# Patient Record
Sex: FEMALE | Race: WHITE | Employment: UNEMPLOYED | URBAN - METROPOLITAN AREA
[De-identification: names, ages, dates, MRNs, and addresses within clinical notes are randomized per-mention and may not be internally consistent; named-entity substitution may affect disease eponyms.]

---

## 2017-03-06 ENCOUNTER — HOSPITAL ENCOUNTER (EMERGENCY)
Age: 37
Discharge: HOME | End: 2017-03-06
Attending: EMERGENCY MEDICINE

## 2017-03-06 ENCOUNTER — APPOINTMENT (OUTPATIENT)
Dept: GENERAL RADIOLOGY | Age: 37
End: 2017-03-06
Attending: PHYSICIAN ASSISTANT

## 2017-03-06 VITALS
RESPIRATION RATE: 20 BRPM | HEART RATE: 116 BPM | HEIGHT: 60 IN | OXYGEN SATURATION: 98 % | WEIGHT: 170 LBS | BODY MASS INDEX: 33.38 KG/M2 | SYSTOLIC BLOOD PRESSURE: 149 MMHG | DIASTOLIC BLOOD PRESSURE: 90 MMHG | TEMPERATURE: 98 F

## 2017-03-06 DIAGNOSIS — J20.8 ACUTE BRONCHITIS DUE TO OTHER SPECIFIED ORGANISMS: Primary | ICD-10-CM

## 2017-03-06 PROCEDURE — 94640 AIRWAY INHALATION TREATMENT: CPT

## 2017-03-06 PROCEDURE — 99284 EMERGENCY DEPT VISIT MOD MDM: CPT

## 2017-03-06 PROCEDURE — 99283 EMERGENCY DEPT VISIT LOW MDM: CPT

## 2017-03-06 PROCEDURE — 71020 XR CHEST PA + LAT CHEST (CPT=71020): CPT

## 2017-03-06 RX ORDER — IPRATROPIUM BROMIDE AND ALBUTEROL SULFATE 2.5; .5 MG/3ML; MG/3ML
3 SOLUTION RESPIRATORY (INHALATION) ONCE
Status: COMPLETED | OUTPATIENT
Start: 2017-03-06 | End: 2017-03-06

## 2017-03-06 RX ORDER — AZITHROMYCIN 250 MG/1
TABLET, FILM COATED ORAL
Qty: 1 PACKAGE | Refills: 0 | Status: SHIPPED | OUTPATIENT
Start: 2017-03-06 | End: 2017-03-11

## 2017-03-06 RX ORDER — PREDNISONE 20 MG/1
40 TABLET ORAL DAILY
Qty: 10 TABLET | Refills: 0 | Status: SHIPPED | OUTPATIENT
Start: 2017-03-06 | End: 2017-03-11

## 2017-03-06 RX ORDER — ALBUTEROL SULFATE 90 UG/1
2 AEROSOL, METERED RESPIRATORY (INHALATION) EVERY 4 HOURS PRN
Qty: 1 INHALER | Refills: 0 | Status: SHIPPED | OUTPATIENT
Start: 2017-03-06 | End: 2017-04-05

## 2017-03-06 RX ORDER — BENZONATATE 200 MG/1
200 CAPSULE ORAL 3 TIMES DAILY PRN
Qty: 30 CAPSULE | Refills: 0 | Status: SHIPPED | OUTPATIENT
Start: 2017-03-06 | End: 2019-12-18 | Stop reason: CLARIF

## 2017-03-06 NOTE — ED PROVIDER NOTES
I reviewed that chart and discussed the case. I have examined the patient and noted lungs are clear to auscultation bilaterally no wheezes retractions rhonchi cardiovascular exam shows regular rhythm. .      I agree with the following clinical impression(s

## 2017-03-06 NOTE — ED PROVIDER NOTES
Patient Seen in: THE [facility] Emergency Department In [place]    History   Patient presents with:  Dyspnea JAIMIE SOB (respiratory)    Stated Complaint: sob, cough, chest tightness through back    HPI    43-year-old female who comes in complaining of 3 weeks o intact, conjunctiva and cornea clear, normal fundoscopic exam   Ears:  TM pearly gray color, mild b/l effusion, external ear canals normal, both ears, no mastoid tenderness bilaterally   Nose:  Nares symmetrical, minimal watery discharge; no sinus tenderne patient. Patient feels much more comfortable after completing her nebulizer treatment. Reviewed at home treatment plan including antibiotic, steroid and inhaler.   If patient starts to get more short of breath or to have chest pain or palpitation she needs

## 2018-05-04 ENCOUNTER — TELEPHONE (OUTPATIENT)
Dept: FAMILY MEDICINE CLINIC | Facility: CLINIC | Age: 38
End: 2018-05-04

## 2018-05-15 ENCOUNTER — TELEPHONE (OUTPATIENT)
Dept: FAMILY MEDICINE CLINIC | Facility: CLINIC | Age: 38
End: 2018-05-15

## 2019-04-23 ENCOUNTER — LAB ENCOUNTER (OUTPATIENT)
Dept: LAB | Age: 39
End: 2019-04-23
Attending: OBSTETRICS & GYNECOLOGY
Payer: COMMERCIAL

## 2019-04-23 ENCOUNTER — OFFICE VISIT (OUTPATIENT)
Dept: OBGYN CLINIC | Facility: CLINIC | Age: 39
End: 2019-04-23
Payer: COMMERCIAL

## 2019-04-23 ENCOUNTER — TELEPHONE (OUTPATIENT)
Dept: OBGYN CLINIC | Facility: CLINIC | Age: 39
End: 2019-04-23

## 2019-04-23 VITALS
HEART RATE: 104 BPM | DIASTOLIC BLOOD PRESSURE: 92 MMHG | WEIGHT: 192.19 LBS | HEIGHT: 60 IN | SYSTOLIC BLOOD PRESSURE: 130 MMHG | BODY MASS INDEX: 37.73 KG/M2

## 2019-04-23 DIAGNOSIS — Z01.419 ENCOUNTER FOR WELL WOMAN EXAM WITH ROUTINE GYNECOLOGICAL EXAM: Primary | ICD-10-CM

## 2019-04-23 DIAGNOSIS — Z12.4 CERVICAL CANCER SCREENING: ICD-10-CM

## 2019-04-23 DIAGNOSIS — Z01.419 ENCOUNTER FOR WELL WOMAN EXAM WITH ROUTINE GYNECOLOGICAL EXAM: ICD-10-CM

## 2019-04-23 PROCEDURE — 82306 VITAMIN D 25 HYDROXY: CPT | Performed by: OBSTETRICS & GYNECOLOGY

## 2019-04-23 PROCEDURE — 99385 PREV VISIT NEW AGE 18-39: CPT | Performed by: OBSTETRICS & GYNECOLOGY

## 2019-04-23 PROCEDURE — 80050 GENERAL HEALTH PANEL: CPT | Performed by: OBSTETRICS & GYNECOLOGY

## 2019-04-23 PROCEDURE — 87624 HPV HI-RISK TYP POOLED RSLT: CPT | Performed by: OBSTETRICS & GYNECOLOGY

## 2019-04-23 PROCEDURE — 80061 LIPID PANEL: CPT | Performed by: OBSTETRICS & GYNECOLOGY

## 2019-04-23 PROCEDURE — 88175 CYTOPATH C/V AUTO FLUID REDO: CPT | Performed by: OBSTETRICS & GYNECOLOGY

## 2019-04-23 NOTE — PROGRESS NOTES
Rafael Sears is a 45year old female  Patient's last menstrual period was 2019 (exact date). Patient presents with:  Wellness Visit: Essure removed possibly  .   Patient c/o occasional cramping , had essure placed , concerned if that Attends meetings of clubs or organizations: Not on file        Relationship status: Not on file      Intimate partner violence:        Fear of current or ex partner: Not on file        Emotionally abused: Not on file        Physically abused: Not on file Denies any breast pain, lumps, or discharge. Neurological:  denies headaches, extremity weakness or numbness. Psychiatric: denies depression or anxiety. Endocrine:   denies excessive thirst or urination.   Heme/Lymph:  denies history of anemia, easy bru

## 2019-04-23 NOTE — TELEPHONE ENCOUNTER
Per Agapito R from [address] with the [employer], pt has an active medical coverage with an effective date of 09- to present. No reference # for this call just Will R and today's date 04-.  Thanks

## 2019-04-24 RX ORDER — ERGOCALCIFEROL (VITAMIN D2) 1250 MCG
50000 CAPSULE ORAL WEEKLY
Qty: 5 CAPSULE | Refills: 0 | Status: SHIPPED | OUTPATIENT
Start: 2019-04-24 | End: 2019-05-23

## 2019-12-14 ENCOUNTER — APPOINTMENT (OUTPATIENT)
Dept: GENERAL RADIOLOGY | Age: 39
DRG: 153 | End: 2019-12-14
Attending: PHYSICIAN ASSISTANT
Payer: COMMERCIAL

## 2019-12-14 ENCOUNTER — APPOINTMENT (OUTPATIENT)
Dept: CT IMAGING | Facility: HOSPITAL | Age: 39
DRG: 153 | End: 2019-12-14
Attending: HOSPITALIST
Payer: COMMERCIAL

## 2019-12-14 ENCOUNTER — HOSPITAL ENCOUNTER (INPATIENT)
Facility: HOSPITAL | Age: 39
LOS: 1 days | Discharge: HOME | DRG: 153 | End: 2019-12-15
Attending: EMERGENCY MEDICINE | Admitting: HOSPITALIST
Payer: COMMERCIAL

## 2019-12-14 DIAGNOSIS — R74.02 ELEVATED SERUM LACTATE DEHYDROGENASE: ICD-10-CM

## 2019-12-14 DIAGNOSIS — J02.0 STREP PHARYNGITIS: Primary | ICD-10-CM

## 2019-12-14 DIAGNOSIS — D72.829 LEUKOCYTOSIS, UNSPECIFIED TYPE: ICD-10-CM

## 2019-12-14 DIAGNOSIS — A40.0 SEPSIS DUE TO GROUP A STREPTOCOCCUS, UNSPECIFIED WHETHER ACUTE ORGAN DYSFUNCTION PRESENT (HCC): ICD-10-CM

## 2019-12-14 PROCEDURE — 99223 1ST HOSP IP/OBS HIGH 75: CPT | Performed by: HOSPITALIST

## 2019-12-14 PROCEDURE — 70491 CT SOFT TISSUE NECK W/DYE: CPT | Performed by: HOSPITALIST

## 2019-12-14 PROCEDURE — 71046 X-RAY EXAM CHEST 2 VIEWS: CPT | Performed by: PHYSICIAN ASSISTANT

## 2019-12-14 RX ORDER — KETOROLAC TROMETHAMINE 30 MG/ML
30 INJECTION, SOLUTION INTRAMUSCULAR; INTRAVENOUS ONCE
Status: COMPLETED | OUTPATIENT
Start: 2019-12-14 | End: 2019-12-14

## 2019-12-14 RX ORDER — DEXAMETHASONE SODIUM PHOSPHATE 4 MG/ML
6 VIAL (ML) INJECTION ONCE
Status: COMPLETED | OUTPATIENT
Start: 2019-12-14 | End: 2019-12-14

## 2019-12-14 RX ORDER — SODIUM CHLORIDE 9 MG/ML
INJECTION, SOLUTION INTRAVENOUS CONTINUOUS
Status: ACTIVE | OUTPATIENT
Start: 2019-12-14 | End: 2019-12-14

## 2019-12-14 RX ORDER — KETOROLAC TROMETHAMINE 30 MG/ML
15 INJECTION, SOLUTION INTRAMUSCULAR; INTRAVENOUS EVERY 6 HOURS PRN
Status: DISCONTINUED | OUTPATIENT
Start: 2019-12-14 | End: 2019-12-15

## 2019-12-14 RX ORDER — ONDANSETRON 2 MG/ML
4 INJECTION INTRAMUSCULAR; INTRAVENOUS EVERY 6 HOURS PRN
Status: DISCONTINUED | OUTPATIENT
Start: 2019-12-14 | End: 2019-12-15

## 2019-12-14 RX ORDER — TRAZODONE HYDROCHLORIDE 50 MG/1
50 TABLET ORAL NIGHTLY PRN
Status: DISCONTINUED | OUTPATIENT
Start: 2019-12-14 | End: 2019-12-15

## 2019-12-14 RX ORDER — METOCLOPRAMIDE HYDROCHLORIDE 5 MG/ML
10 INJECTION INTRAMUSCULAR; INTRAVENOUS EVERY 8 HOURS PRN
Status: DISCONTINUED | OUTPATIENT
Start: 2019-12-14 | End: 2019-12-15

## 2019-12-14 RX ORDER — ACETAMINOPHEN 325 MG/1
650 TABLET ORAL EVERY 6 HOURS PRN
Status: DISCONTINUED | OUTPATIENT
Start: 2019-12-14 | End: 2019-12-15

## 2019-12-14 NOTE — ED PROVIDER NOTES
Patient Seen in: THE [facility] Emergency Department In [place]      History   Patient presents with:  Sore Throat    Stated Complaint: sore throat    HPI    CHIEF COMPLAINT: Sore throat, right ear pain, left-sided chest pain, fever    HISTORY OF PRESENT ILLN indicated as above. History reviewed. No pertinent past medical history.            Past Surgical History:   Procedure Laterality Date   •   13   • HYSTEROSCOPIC TUBAL STERILIZATION (ESSURE) N/A 2013    Performed by SEDRICK Garcia lymphadenopathy noted. Trachea nontender.   No meningeal signs        Extremities are symmetrical, full range of motion  Psychiatric: there is no agitation      ED Course     Labs Reviewed   COMP METABOLIC PANEL (14) - Abnormal; Notable for the following c gram of Tylenol, liter of fluid. Patient's white blood cell count was 16,000 with a neutrophil shift of 15. Patient's rapid strep test is positive. Patient's lactate is 2.7. Patient's glucose is 214, otherwise unremarkable CMP.     Patient will receive

## 2019-12-15 VITALS
WEIGHT: 193.13 LBS | HEART RATE: 97 BPM | TEMPERATURE: 98 F | RESPIRATION RATE: 20 BRPM | HEIGHT: 60 IN | DIASTOLIC BLOOD PRESSURE: 74 MMHG | BODY MASS INDEX: 37.92 KG/M2 | OXYGEN SATURATION: 97 % | SYSTOLIC BLOOD PRESSURE: 137 MMHG

## 2019-12-15 PROCEDURE — 99238 HOSP IP/OBS DSCHRG MGMT 30/<: CPT | Performed by: HOSPITALIST

## 2019-12-15 NOTE — PLAN OF CARE
NURSING ADMISSION NOTE      Patient admitted via Cart  Oriented to room. Safety precautions initiated. Bed in low position. Call light in reach. Pt A/O x4. Pt here with strep pharyngitis. VSS. C/o neck pain, IV Toradol given with relief.  One time

## 2019-12-15 NOTE — PROGRESS NOTES
Pt seen and examined. Feels much better. Strep throat treated with 1x IM PCN. Ok to DC if remains medically stable. CT neck unremarkable. Pt has new onset DM2 with A1c: 7.6. add metformin on DC.     Apolinar Givens MD

## 2019-12-15 NOTE — PLAN OF CARE
NURSING DISCHARGE NOTE    Discharged Home via Wheelchair. Accompanied by Family member  Belongings Taken by patient/family. Reveiwed AVS with patient pt understood discharge instructions. Pt aware to take prescription to pharmacy.    Educated pt on

## 2019-12-15 NOTE — PLAN OF CARE
Pt it AOx4 on room air and denies any SOB, pt states she in not in any pain. Pt has been afebrile during this shift, and vitals have been stable. Pt was educated with diabetic education .  Pt has no question at this time   Problem: Patient/Family Goals  [facility] Notify MD/LIP if interventions unsuccessful or patient reports new pain  - Anticipate increased pain with activity and pre-medicate as appropriate  Outcome: Progressing     Problem: RISK FOR INFECTION - ADULT  Goal: Absence of fever/infection during antici

## 2019-12-15 NOTE — PLAN OF CARE
Pt is aox4 on room air and denies any SOB pt has denied any pain at this time. Pt states the throat feels better. Educated pt on Diabetes gave pt diabetes packet and informed pt she needs to see a primary care doctor. Pt is aware .  Pt has been afebrile dur assistance  - Assess pain using appropriate pain scale  - Administer analgesics based on type and severity of pain and evaluate response  - Implement non-pharmacological measures as appropriate and evaluate response  - Consider cultural and social influenc

## 2019-12-15 NOTE — H&P
[facility] HOSPITALIST  History and Physical     Liaabbie Malcolm Patient Status:  Inpatient    1980 MRN [id number]   [facility] 4NW-A Attending Rishi Pacheco MD   Hosp Day # 0 PCP None Pcp     Chief Complaint: neck pain    History of Ht 5' (1.524 m)   Wt 193 lb 1.6 oz (87.6 kg)   SpO2 93%   BMI 37.71 kg/m²   General:nad; aox2;very enlarged nearly kissing tonsils in pharynx  Neck: tender right neck  Respiratory: Clear to auscultation bilaterally. No wheezes. No rhonchi.   Cardiovascular:

## 2019-12-16 ENCOUNTER — PATIENT OUTREACH (OUTPATIENT)
Dept: CASE MANAGEMENT | Age: 39
End: 2019-12-16

## 2019-12-16 DIAGNOSIS — Z02.9 ENCOUNTERS FOR UNSPECIFIED ADMINISTRATIVE PURPOSE: ICD-10-CM

## 2019-12-16 DIAGNOSIS — E11.69 DIABETES MELLITUS TYPE 2 IN OBESE (HCC): ICD-10-CM

## 2019-12-16 DIAGNOSIS — E66.9 DIABETES MELLITUS TYPE 2 IN OBESE (HCC): ICD-10-CM

## 2019-12-16 PROCEDURE — 1111F DSCHRG MED/CURRENT MED MERGE: CPT

## 2019-12-16 NOTE — PROGRESS NOTES
A Respi message was sent to the patient for outreach to complete TCM. It was requested the patient call [facility] back at, [phone].

## 2019-12-17 RX ORDER — IBUPROFEN 200 MG
400 TABLET ORAL EVERY 6 HOURS PRN
COMMUNITY
End: 2020-11-13

## 2019-12-17 NOTE — ED PROVIDER NOTES
I have personally reviewed the case with the PA as well as completed a HPI and agree with the care and treatment plan put forth    HEENT : NCAT, EOMI, PEERL, throat clear posterior erythematous tonsils noted without abscess, neck supple, no JVD, trachea mi information is transmitted to the ACR ([facility] [employer] of Radiology) NRDR ([address]) which includes the Dose Index Registry.   PATIENT STATED HISTORY:(As transcribed by Technologist)  Patient complains of right side pain and swellin Given 12/14/19 8749)   iohexol (OMNIPAQUE) 350 MG/ML injection 50 mL (50 mL Intravenous Given 12/14/19 2227)     We will have the patient was of an IV antibiotics as well as given IV fluids per septic protocol.   The patient was observed in the department w

## 2019-12-17 NOTE — PROGRESS NOTES
Initial Post Discharge Follow Up   Discharge Date: 12/15/19  Contact Date: 12/17/2019    Consent Verification:  Assessment Completed With: Patient  HIPAA Verified?   Yes    Discharge Dx:    Strep pharyngitis  (primary encounter diagnosis)  Lactic Acidosi explained to you? Yes  • Do you have any questions about your diagnoses? No  • Are you able to perform normal daily activities of living as you have prior to your hospital stay (dressing, bathing, ambulating to the bathroom, etc)?  yes  • (NCM) Was patient PCP TCM/HFU appointment: scheduled at D/C within 7-14 days  yes; Appointment with [facility] was scheduled for 12/18/2019. NCM Reviewed/scheduled/rescheduled PCP TCM/HFU appointment with pt:  Yes      Have you made all of your follow up appointments? 1.75

## 2019-12-18 ENCOUNTER — OFFICE VISIT (OUTPATIENT)
Dept: INTERNAL MEDICINE CLINIC | Facility: CLINIC | Age: 39
End: 2019-12-18
Payer: COMMERCIAL

## 2019-12-18 VITALS
TEMPERATURE: 98 F | HEIGHT: 60 IN | WEIGHT: 189 LBS | OXYGEN SATURATION: 99 % | RESPIRATION RATE: 18 BRPM | BODY MASS INDEX: 37.11 KG/M2 | SYSTOLIC BLOOD PRESSURE: 120 MMHG | DIASTOLIC BLOOD PRESSURE: 88 MMHG | HEART RATE: 91 BPM

## 2019-12-18 DIAGNOSIS — E11.69 DIABETES MELLITUS TYPE 2 IN OBESE (HCC): ICD-10-CM

## 2019-12-18 DIAGNOSIS — J02.0 STREP THROAT: Primary | ICD-10-CM

## 2019-12-18 DIAGNOSIS — E66.9 DIABETES MELLITUS TYPE 2 IN OBESE (HCC): ICD-10-CM

## 2019-12-18 PROCEDURE — 99213 OFFICE O/P EST LOW 20 MIN: CPT | Performed by: CLINICAL NURSE SPECIALIST

## 2019-12-18 PROCEDURE — 1111F DSCHRG MED/CURRENT MED MERGE: CPT | Performed by: CLINICAL NURSE SPECIALIST

## 2019-12-18 RX ORDER — BLOOD SUGAR DIAGNOSTIC
STRIP MISCELLANEOUS
Qty: 100 STRIP | Refills: 0 | Status: SHIPPED | OUTPATIENT
Start: 2019-12-18 | End: 2020-12-17

## 2019-12-18 NOTE — PATIENT INSTRUCTIONS
PATIENT INSTRUCTIONS:   1.  Monitor blood sugar once daily before meals -- bring meter to all MD appts for review      How to Check Your Blood Sugar    Keeping track of how much sugar (glucose) is in your blood is an important part of self-care when you hav test at the lab. · Before a meal (preprandial glucose). The target range is between 80 and 130 mg/dL. · One to 2 hours after a meal (postprandial glucose). The range is less than 180 mg/dL.   Track your readings  Use a notebook, chart, or log book to keep included with meter. Step 4. Read and record your results  · Wait for your meter to show the result. · If you see an error message, recheck using a fresh strip and a fresh drop of blood.  Also recheck if the glucose numbers aren't what you expect—too low of carbohydrates):  · 1/2 cup of canned or frozen fruit  · A small piece of fresh fruit (4 ounces)  · 1 slice of bread  · 1/2 cup of oatmeal  · 1/3 cup of rice  · 4 to 6 crackers  · 1/2 English muffin  · 1/2 cup of black beans  · 1/4 of a large baked potat size, total carbohydrates, fiber, calories, sugar, and saturated and trans fats. Look for healthier alternatives to foods that have added sugar.   · Plan ahead for parties so you can still have a good time without going overboard with unhealthy food choices [address] [facility], [address] [place] [address]. All rights reserved. This information is not intended as a substitute for professional medical care. Always follow your healthcare professional's instructions.

## 2019-12-18 NOTE — PROGRESS NOTES
[address]  TRANSITIONAL CARE CLINIC      HISTORY   CHIEF COMPLAINT: post hospital follow up visit  HPI: Vasyl Morris is a 44year old female here today for follow up after being hospitalized for neck pain, throat pain.   Lili Watkins • Diabetes Mother    • Diabetes Maternal Grandmother    • Heart Disorder Maternal Grandmother    • Diabetes Maternal Grandfather    • Heart Disorder Maternal Grandfather    • Diabetes Paternal Grandmother    • Heart Disorder Paternal Grandmother    • Callie Knapp motion of extremities  ENDOCRINE: denies hypoglycemia   PHYSICAL EXAM:   12/18/19  1252   BP: 120/88   Pulse: 91   Resp: 18   Temp: 98.3 °F (36.8 °C)       GENERAL: well developed, well nourished, in no apparent distress  INTEGUMENTARY: warm, dry  HEENT: a care documents  ? Reviewed need for follow-up on pending tests, need for follow-up on diagnostic tests and need for follow-up on treatments  ? specialists already aware of assumption/resumption of care  ?  Education given to patient on self-management, inde

## 2019-12-18 NOTE — PROGRESS NOTES
TRANSITIONAL CARE CLINIC PHARMACIST MEDICATION RECONCILIATION        Bhupinder Su MRN [id number]    1980 PCP None Pcp       Comments: Medication history completed in [address] [place] [address] by pharmacist with patient.       The following medic

## 2019-12-26 NOTE — DISCHARGE SUMMARY
[facility] HOSPITALIST  DISCHARGE SUMMARY     Rosi Harris Patient Status:  Inpatient    1980 MRN [id number]   [facility] 4NW-A Attending No att. providers found   Hosp Day # 1 PCP None Pcp     Date of Admission: 2019  Date of D PCP    Schedule an appointment as soon as possible for a visit      Appointments for Next 30 Days 12/26/2019 - 1/25/2020      Date Arrival Time Visit Type Length Department Provider     1/9/2020  9:20 AM  NON-[facility] HOSPITAL FOLLOW UP [0720] 40 min.  [id number]

## 2020-01-09 ENCOUNTER — OFFICE VISIT (OUTPATIENT)
Dept: FAMILY MEDICINE CLINIC | Facility: CLINIC | Age: 40
End: 2020-01-09
Payer: COMMERCIAL

## 2020-01-09 VITALS
SYSTOLIC BLOOD PRESSURE: 110 MMHG | HEIGHT: 61 IN | RESPIRATION RATE: 18 BRPM | WEIGHT: 184 LBS | HEART RATE: 111 BPM | BODY MASS INDEX: 34.74 KG/M2 | DIASTOLIC BLOOD PRESSURE: 82 MMHG | OXYGEN SATURATION: 99 %

## 2020-01-09 DIAGNOSIS — E66.9 DIABETES MELLITUS TYPE 2 IN OBESE (HCC): Primary | ICD-10-CM

## 2020-01-09 DIAGNOSIS — E11.69 DIABETES MELLITUS TYPE 2 IN OBESE (HCC): Primary | ICD-10-CM

## 2020-01-09 DIAGNOSIS — E66.9 OBESITY (BMI 30-39.9): ICD-10-CM

## 2020-01-09 DIAGNOSIS — D72.829 LEUKOCYTOSIS, UNSPECIFIED TYPE: ICD-10-CM

## 2020-01-09 PROBLEM — A40.0: Status: RESOLVED | Noted: 2019-12-14 | Resolved: 2020-01-09

## 2020-01-09 PROBLEM — J02.0 STREP THROAT: Status: RESOLVED | Noted: 2019-12-14 | Resolved: 2020-01-09

## 2020-01-09 PROCEDURE — 1111F DSCHRG MED/CURRENT MED MERGE: CPT | Performed by: EMERGENCY MEDICINE

## 2020-01-09 PROCEDURE — 99214 OFFICE O/P EST MOD 30 MIN: CPT | Performed by: EMERGENCY MEDICINE

## 2020-01-09 RX ORDER — ATORVASTATIN CALCIUM 10 MG/1
10 TABLET, FILM COATED ORAL NIGHTLY
Qty: 30 TABLET | Refills: 2 | Status: SHIPPED | OUTPATIENT
Start: 2020-01-09 | End: 2020-04-24

## 2020-01-09 NOTE — PROGRESS NOTES
Chief Complaint:   Patient presents with:  Hospital F/U: NP. Strep, new onset diabetes    HPI:   This is a 44year old female     DIABETES  Recently diagnosed with Diabetes. Returned from [place] not feeling well.  Went to ER and admitted for Strep throat not apply Misc, 1 lancet by Finger stick route daily. , Disp: 100 each, Rfl: 0  ibuprofen 200 MG Oral Tab, Take 400 mg by mouth every 6 (six) hours as needed for Pain., Disp: , Rfl:     No current facility-administered medications on file prior to visit. Calculated Osmolality 288 275 - 295 mOsm/kg    GFR, Non- 84 >=60    GFR, -American 97 >=60    AST 27 15 - 37 U/L    ALT 56 13 - 56 U/L    Alkaline Phosphatase 70 37 - 98 U/L    Bilirubin, Total 1.3 0.1 - 2.0 mg/dL    Total Protein 8. ASSESSMENT AND PLAN:         1. Diabetes mellitus type 2 in obese ([address]  Tolerating meds well with exception of diarrhea but patient will need to continue. Last hemoglobin A1c 7.6 Will start atorvastatin.   All laboratories discussed  Follow-up

## 2020-01-09 NOTE — PATIENT INSTRUCTIONS
Thank you for choosing [facility]  To Do:  FOR Delilah Burrell    · Follow up with Evelina Madrid for DM education  · Have blood tests done after fasting  · After blood draw, Start Atorvastatin for cholesterol  · Follow up in 1 month for mikhail on average over the last 3 months.   Glucose sticks to hemoglobin in the blood. Hemoglobin is a protein in red blood cells that carries oxygen. When blood sugar is high, more glucose builds up and sticks to the hemoglobin.  The A1C test measures how much of used to draw blood from a vein in your arm or hand.   Does this test pose any risks? Having a blood test with a needle carries some risks. These include bleeding, infection, bruising, and feeling lightheaded.  When the needle pricks your arm or hand, you m percentage. But it can also be shown as a number representing the estimated Average Glucose (eAG). Unlike the A1C percentage, eAG is a number similar to the numbers listed on your daily glucose monitor.  Both A1C and eAG measure the amount of glucose stuck blood sugar. · Starches are found in bread, cereals, pasta, and dried beans. They’re also found in corn, peas, potatoes, yam, acorn squash, and butternut squash.  Starches also raise blood sugar.   · Fiber is found in foods such as vegetables, fruits, bean for your heart. Protein  Protein helps the body build and repair muscle and other tissue. Protein has little or no effect on blood sugar. However, many foods that contain protein also contain saturated fat.  By choosing low-fat protein sources, you can get

## 2020-01-10 ENCOUNTER — LABORATORY ENCOUNTER (OUTPATIENT)
Dept: LAB | Age: 40
End: 2020-01-10
Attending: EMERGENCY MEDICINE
Payer: COMMERCIAL

## 2020-01-10 DIAGNOSIS — D72.829 LEUKOCYTOSIS, UNSPECIFIED TYPE: ICD-10-CM

## 2020-01-10 DIAGNOSIS — E11.69 DIABETES MELLITUS TYPE 2 IN OBESE (HCC): ICD-10-CM

## 2020-01-10 DIAGNOSIS — E66.9 DIABETES MELLITUS TYPE 2 IN OBESE (HCC): ICD-10-CM

## 2020-01-10 LAB
ALBUMIN SERPL-MCNC: 3.8 G/DL (ref 3.4–5)
ALBUMIN/GLOB SERPL: 0.9 {RATIO} (ref 1–2)
ALP LIVER SERPL-CCNC: 70 U/L (ref 37–98)
ALT SERPL-CCNC: 56 U/L (ref 13–56)
ANION GAP SERPL CALC-SCNC: 5 MMOL/L (ref 0–18)
AST SERPL-CCNC: 27 U/L (ref 15–37)
BASOPHILS # BLD AUTO: 0.05 X10(3) UL (ref 0–0.2)
BASOPHILS NFR BLD AUTO: 0.6 %
BILIRUB SERPL-MCNC: 1.3 MG/DL (ref 0.1–2)
BUN BLD-MCNC: 15 MG/DL (ref 7–18)
BUN/CREAT SERPL: 17.2 (ref 10–20)
CALCIUM BLD-MCNC: 8.8 MG/DL (ref 8.5–10.1)
CHLORIDE SERPL-SCNC: 107 MMOL/L (ref 98–112)
CHOLEST SMN-MCNC: 198 MG/DL (ref ?–200)
CO2 SERPL-SCNC: 26 MMOL/L (ref 21–32)
CREAT BLD-MCNC: 0.87 MG/DL (ref 0.55–1.02)
CREAT UR-SCNC: 264 MG/DL
DEPRECATED RDW RBC AUTO: 42.5 FL (ref 35.1–46.3)
EOSINOPHIL # BLD AUTO: 0.31 X10(3) UL (ref 0–0.7)
EOSINOPHIL NFR BLD AUTO: 4 %
ERYTHROCYTE [DISTWIDTH] IN BLOOD BY AUTOMATED COUNT: 12.9 % (ref 11–15)
EST. AVERAGE GLUCOSE BLD GHB EST-MCNC: 166 MG/DL (ref 68–126)
GLOBULIN PLAS-MCNC: 4.4 G/DL (ref 2.8–4.4)
GLUCOSE BLD-MCNC: 118 MG/DL (ref 70–99)
HBA1C MFR BLD HPLC: 7.4 % (ref ?–5.7)
HCT VFR BLD AUTO: 42 % (ref 35–48)
HDLC SERPL-MCNC: 42 MG/DL (ref 40–59)
HGB BLD-MCNC: 14 G/DL (ref 12–16)
IMM GRANULOCYTES # BLD AUTO: 0.04 X10(3) UL (ref 0–1)
IMM GRANULOCYTES NFR BLD: 0.5 %
LDLC SERPL CALC-MCNC: 110 MG/DL (ref ?–100)
LYMPHOCYTES # BLD AUTO: 2.42 X10(3) UL (ref 1–4)
LYMPHOCYTES NFR BLD AUTO: 31.1 %
M PROTEIN MFR SERPL ELPH: 8.2 G/DL (ref 6.4–8.2)
MCH RBC QN AUTO: 30.4 PG (ref 26–34)
MCHC RBC AUTO-ENTMCNC: 33.3 G/DL (ref 31–37)
MCV RBC AUTO: 91.1 FL (ref 80–100)
MICROALBUMIN UR-MCNC: 3.11 MG/DL
MICROALBUMIN/CREAT 24H UR-RTO: 11.8 UG/MG (ref ?–30)
MONOCYTES # BLD AUTO: 0.49 X10(3) UL (ref 0.1–1)
MONOCYTES NFR BLD AUTO: 6.3 %
NEUTROPHILS # BLD AUTO: 4.48 X10 (3) UL (ref 1.5–7.7)
NEUTROPHILS # BLD AUTO: 4.48 X10(3) UL (ref 1.5–7.7)
NEUTROPHILS NFR BLD AUTO: 57.5 %
NONHDLC SERPL-MCNC: 156 MG/DL (ref ?–130)
OSMOLALITY SERPL CALC.SUM OF ELEC: 288 MOSM/KG (ref 275–295)
PATIENT FASTING Y/N/NP: YES
PATIENT FASTING Y/N/NP: YES
PLATELET # BLD AUTO: 384 10(3)UL (ref 150–450)
POTASSIUM SERPL-SCNC: 3.8 MMOL/L (ref 3.5–5.1)
RBC # BLD AUTO: 4.61 X10(6)UL (ref 3.8–5.3)
SODIUM SERPL-SCNC: 138 MMOL/L (ref 136–145)
TRIGL SERPL-MCNC: 230 MG/DL (ref 30–149)
VLDLC SERPL CALC-MCNC: 46 MG/DL (ref 0–30)
WBC # BLD AUTO: 7.8 X10(3) UL (ref 4–11)

## 2020-01-10 PROCEDURE — 82570 ASSAY OF URINE CREATININE: CPT | Performed by: EMERGENCY MEDICINE

## 2020-01-10 PROCEDURE — 80061 LIPID PANEL: CPT | Performed by: EMERGENCY MEDICINE

## 2020-01-10 PROCEDURE — 36415 COLL VENOUS BLD VENIPUNCTURE: CPT | Performed by: EMERGENCY MEDICINE

## 2020-01-10 PROCEDURE — 83036 HEMOGLOBIN GLYCOSYLATED A1C: CPT | Performed by: EMERGENCY MEDICINE

## 2020-01-10 PROCEDURE — 80053 COMPREHEN METABOLIC PANEL: CPT | Performed by: EMERGENCY MEDICINE

## 2020-01-10 PROCEDURE — 85025 COMPLETE CBC W/AUTO DIFF WBC: CPT | Performed by: EMERGENCY MEDICINE

## 2020-01-10 PROCEDURE — 82043 UR ALBUMIN QUANTITATIVE: CPT | Performed by: EMERGENCY MEDICINE

## 2020-01-14 ENCOUNTER — TELEPHONE (OUTPATIENT)
Dept: FAMILY MEDICINE CLINIC | Facility: CLINIC | Age: 40
End: 2020-01-14

## 2020-01-14 NOTE — TELEPHONE ENCOUNTER
----- Message from Dionisio Shipman MD sent at 1/13/2020  5:34 PM CST -----  Labs stable  Contoinue present mgt  Follow up as directed  mychart message sent

## 2020-01-16 ENCOUNTER — NURSE ONLY (OUTPATIENT)
Dept: ENDOCRINOLOGY CLINIC | Facility: CLINIC | Age: 40
End: 2020-01-16
Payer: COMMERCIAL

## 2020-01-16 ENCOUNTER — OFFICE VISIT (OUTPATIENT)
Dept: ENDOCRINOLOGY CLINIC | Facility: CLINIC | Age: 40
End: 2020-01-16
Payer: COMMERCIAL

## 2020-01-16 VITALS — BODY MASS INDEX: 35 KG/M2 | HEIGHT: 61 IN

## 2020-01-16 DIAGNOSIS — E11.69 DIABETES MELLITUS TYPE 2 IN OBESE (HCC): ICD-10-CM

## 2020-01-16 DIAGNOSIS — E66.9 DIABETES MELLITUS TYPE 2 IN OBESE (HCC): ICD-10-CM

## 2020-01-16 DIAGNOSIS — E11.69 DIABETES MELLITUS TYPE 2 IN OBESE (HCC): Primary | ICD-10-CM

## 2020-01-16 DIAGNOSIS — E66.9 DIABETES MELLITUS TYPE 2 IN OBESE (HCC): Primary | ICD-10-CM

## 2020-01-16 PROCEDURE — G0108 DIAB MANAGE TRN  PER INDIV: HCPCS | Performed by: DIETITIAN, REGISTERED

## 2020-01-16 NOTE — PROGRESS NOTES
Patient was recently seen by PCP. Patient was intended to be scheduled with CDE for education not with APRN. Clarified order with Dr. Yulia Goodwin. APRN referral placed by [facility] department prior to patients follow up with PCP.   PCP is managing patients diabet

## 2020-01-19 VITALS — BODY MASS INDEX: 35 KG/M2 | WEIGHT: 185 LBS | DIASTOLIC BLOOD PRESSURE: 78 MMHG | SYSTOLIC BLOOD PRESSURE: 112 MMHG

## 2020-01-20 NOTE — PROGRESS NOTES
Joanne Desai  : 1980 attended Step 1 Diabetic Education:    Date: 2020  Referring Provider: Dr. Max Salnias  Start time: 12:30pm End time: 1:30pm    /78 (BP Location: Right arm, Patient Position: Sitting, Cuff Size: adult)   Wt 18 127             128             114             107             117             121             127             103                              Diabetic Medications     Name MG Dosage Comments   Oral: Metformin  500 mg  1 tab twice daily

## 2020-01-31 ENCOUNTER — TELEPHONE (OUTPATIENT)
Dept: FAMILY MEDICINE CLINIC | Facility: CLINIC | Age: 40
End: 2020-01-31

## 2020-02-04 ENCOUNTER — OFFICE VISIT (OUTPATIENT)
Dept: FAMILY MEDICINE CLINIC | Facility: CLINIC | Age: 40
End: 2020-02-04
Payer: COMMERCIAL

## 2020-02-04 VITALS
OXYGEN SATURATION: 98 % | BODY MASS INDEX: 34 KG/M2 | RESPIRATION RATE: 16 BRPM | SYSTOLIC BLOOD PRESSURE: 120 MMHG | HEART RATE: 96 BPM | DIASTOLIC BLOOD PRESSURE: 85 MMHG | WEIGHT: 179 LBS

## 2020-02-04 DIAGNOSIS — E11.69 DIABETES MELLITUS TYPE 2 IN OBESE (HCC): Primary | ICD-10-CM

## 2020-02-04 DIAGNOSIS — E55.9 VITAMIN D DEFICIENCY: ICD-10-CM

## 2020-02-04 DIAGNOSIS — Z28.21 REFUSED INFLUENZA VACCINE: ICD-10-CM

## 2020-02-04 DIAGNOSIS — E11.69 TYPE 2 DIABETES MELLITUS WITH HYPERCHOLESTEROLEMIA (HCC): ICD-10-CM

## 2020-02-04 DIAGNOSIS — E66.9 DIABETES MELLITUS TYPE 2 IN OBESE (HCC): Primary | ICD-10-CM

## 2020-02-04 DIAGNOSIS — E78.00 TYPE 2 DIABETES MELLITUS WITH HYPERCHOLESTEROLEMIA (HCC): ICD-10-CM

## 2020-02-04 DIAGNOSIS — R42 VERTIGO: ICD-10-CM

## 2020-02-04 DIAGNOSIS — Z28.21 REFUSED PNEUMOCOCCAL VACCINE: ICD-10-CM

## 2020-02-04 DIAGNOSIS — E66.9 OBESITY (BMI 30-39.9): ICD-10-CM

## 2020-02-04 PROBLEM — R74.02 ELEVATED SERUM LACTATE DEHYDROGENASE: Status: RESOLVED | Noted: 2019-12-14 | Resolved: 2020-02-04

## 2020-02-04 PROCEDURE — 99214 OFFICE O/P EST MOD 30 MIN: CPT | Performed by: EMERGENCY MEDICINE

## 2020-02-04 NOTE — PROGRESS NOTES
Chief Complaint:   Patient presents with:  Diabetes: med f/u     HPI:   This is a 44year old female         DIABETES  Recently dx with DM. Currently on Metformin 500 BID. Atorvastatin 10 mg daily. C/O 1-2 episodes of dizziness.  Feels like she is moving times daily with meals. , Disp: 60 tablet, Rfl: 2  Glucose Blood (ONETOUCH VERIO) In Vitro Strip, Use as directed., Disp: 100 strip, Rfl: 0  ONETOUCH DELICA LANCETS FINE Does not apply Misc, 1 lancet by Finger stick route daily. , Disp: 100 each, Rfl: 0  ibu Collection Time: 01/10/20  9:28 AM   Result Value Ref Range    Glucose 118 (H) 70 - 99 mg/dL    Sodium 138 136 - 145 mmol/L    Potassium 3.8 3.5 - 5.1 mmol/L    Chloride 107 98 - 112 mmol/L    CO2 26.0 21.0 - 32.0 mmol/L    Anion Gap 5 0 - 18 mmol/L Absolute 2.42 1.00 - 4.00 x10(3) uL    Monocyte Absolute 0.49 0.10 - 1.00 x10(3) uL    Eosinophil Absolute 0.31 0.00 - 0.70 x10(3) uL    Basophil Absolute 0.05 0.00 - 0.20 x10(3) uL    Immature Granulocyte Absolute 0.04 0.00 - 1.00 x10(3) uL    Neutrophil

## 2020-02-04 NOTE — PATIENT INSTRUCTIONS
Thank you for choosing [facility]  To Do:  FOR Lb Cavlo    · [id number] Julianna Galeano to continue with all meds  · Follow up in 3 months  · Continue with weight loss  · continue with daily blood sugar checks  · Return sooner if worse  · Take VIT d OTC 2000

## 2020-02-29 ENCOUNTER — OFFICE VISIT (OUTPATIENT)
Dept: FAMILY MEDICINE CLINIC | Facility: CLINIC | Age: 40
End: 2020-02-29
Payer: COMMERCIAL

## 2020-02-29 VITALS
HEART RATE: 120 BPM | HEIGHT: 61 IN | WEIGHT: 179 LBS | DIASTOLIC BLOOD PRESSURE: 80 MMHG | TEMPERATURE: 99 F | BODY MASS INDEX: 33.79 KG/M2 | OXYGEN SATURATION: 98 % | SYSTOLIC BLOOD PRESSURE: 110 MMHG | RESPIRATION RATE: 16 BRPM

## 2020-02-29 DIAGNOSIS — J02.0 STREP PHARYNGITIS: Primary | ICD-10-CM

## 2020-02-29 DIAGNOSIS — J02.9 SORE THROAT: ICD-10-CM

## 2020-02-29 LAB
CONTROL LINE PRESENT WITH A CLEAR BACKGROUND (YES/NO): YES YES/NO
KIT LOT #: NORMAL NUMERIC
STREP GRP A CUL-SCR: POSITIVE

## 2020-02-29 PROCEDURE — 99213 OFFICE O/P EST LOW 20 MIN: CPT | Performed by: FAMILY MEDICINE

## 2020-02-29 PROCEDURE — 87880 STREP A ASSAY W/OPTIC: CPT | Performed by: FAMILY MEDICINE

## 2020-02-29 RX ORDER — AMOXICILLIN 875 MG/1
875 TABLET, COATED ORAL 2 TIMES DAILY
Qty: 20 TABLET | Refills: 0 | Status: SHIPPED | OUTPATIENT
Start: 2020-02-29 | End: 2020-11-13

## 2020-02-29 NOTE — PROGRESS NOTES
Krystal Fang is a 44year old female. S:  Patient presents today with the following concerns:  Sore Throat (s/s for 1 day. )   Cough (OTC meds taken)   Body ache and/or chills      · Denies N/V/D.      · Sweats last night      Current Outpatient Breastfeeding No   BMI 33.82 kg/m²   GENERAL: well developed, well nourished,in no apparent distress. Mood, affect, and behavior are normal.  SKIN: no rashes,no suspicious lesions  HEENT: atraumatic, normocephalic  Bilateral TM's clear.   Pharynx:  2+ tons

## 2020-02-29 NOTE — PATIENT INSTRUCTIONS
Pharyngitis: Strep (Confirmed)    You have had a positive test for strep throat. Strep throat is a contagious illness. It is spread by coughing, kissing or by touching others after touching your mouth or nose.  Symptoms include throat pain that is worse w · Lymph nodes getting larger or becoming soft in the middle  · You can't swallow liquids or you can't open your mouth wide because of throat pain  · Signs of dehydration. These include very dark urine or no urine, sunken eyes, and dizziness.   · Trouble lorna

## 2020-04-24 DIAGNOSIS — E66.9 DIABETES MELLITUS TYPE 2 IN OBESE (HCC): ICD-10-CM

## 2020-04-24 DIAGNOSIS — E11.69 DIABETES MELLITUS TYPE 2 IN OBESE (HCC): ICD-10-CM

## 2020-04-24 RX ORDER — ATORVASTATIN CALCIUM 10 MG/1
10 TABLET, FILM COATED ORAL NIGHTLY
Qty: 30 TABLET | Refills: 2 | Status: SHIPPED | OUTPATIENT
Start: 2020-04-24 | End: 2020-09-25

## 2020-09-24 DIAGNOSIS — E11.69 DIABETES MELLITUS TYPE 2 IN OBESE (HCC): ICD-10-CM

## 2020-09-24 DIAGNOSIS — E66.9 DIABETES MELLITUS TYPE 2 IN OBESE (HCC): ICD-10-CM

## 2020-09-25 RX ORDER — ATORVASTATIN CALCIUM 10 MG/1
10 TABLET, FILM COATED ORAL NIGHTLY
Qty: 30 TABLET | Refills: 0 | Status: SHIPPED | OUTPATIENT
Start: 2020-09-25 | End: 2021-01-09

## 2020-09-25 NOTE — TELEPHONE ENCOUNTER
Medication(s) to Refill:   Requested Prescriptions     Pending Prescriptions Disp Refills   • ATORVASTATIN 10 MG Oral Tab [Pharmacy Med Name: Atorvastatin Calcium 10 MG Oral Tablet] 30 tablet 0     Sig: Take 1 tablet by mouth nightly   • METFORMIN

## 2020-11-13 ENCOUNTER — OFFICE VISIT (OUTPATIENT)
Dept: OBGYN CLINIC | Facility: CLINIC | Age: 40
End: 2020-11-13
Payer: COMMERCIAL

## 2020-11-13 VITALS
WEIGHT: 178 LBS | BODY MASS INDEX: 33.61 KG/M2 | SYSTOLIC BLOOD PRESSURE: 102 MMHG | DIASTOLIC BLOOD PRESSURE: 60 MMHG | HEIGHT: 61 IN | HEART RATE: 107 BPM

## 2020-11-13 DIAGNOSIS — Z12.4 CERVICAL CANCER SCREENING: ICD-10-CM

## 2020-11-13 DIAGNOSIS — Z12.31 BREAST CANCER SCREENING BY MAMMOGRAM: ICD-10-CM

## 2020-11-13 DIAGNOSIS — Z01.419 ENCOUNTER FOR WELL WOMAN EXAM WITH ROUTINE GYNECOLOGICAL EXAM: Primary | ICD-10-CM

## 2020-11-13 PROCEDURE — 3074F SYST BP LT 130 MM HG: CPT | Performed by: OBSTETRICS & GYNECOLOGY

## 2020-11-13 PROCEDURE — 88175 CYTOPATH C/V AUTO FLUID REDO: CPT | Performed by: OBSTETRICS & GYNECOLOGY

## 2020-11-13 PROCEDURE — 3008F BODY MASS INDEX DOCD: CPT | Performed by: OBSTETRICS & GYNECOLOGY

## 2020-11-13 PROCEDURE — 3078F DIAST BP <80 MM HG: CPT | Performed by: OBSTETRICS & GYNECOLOGY

## 2020-11-13 PROCEDURE — 87624 HPV HI-RISK TYP POOLED RSLT: CPT | Performed by: OBSTETRICS & GYNECOLOGY

## 2020-11-13 PROCEDURE — 99396 PREV VISIT EST AGE 40-64: CPT | Performed by: OBSTETRICS & GYNECOLOGY

## 2020-11-13 NOTE — PROGRESS NOTES
Nikkie Alaniz is a 36year old female  Patient's last menstrual period was 2020 (exact date). Patient presents with:  Wellness Visit  .   Patient has no complaints    OBSTETRICS HISTORY:  OB History    Para Term  AB Living organizations: Not on file        Relationship status: Not on file      Intimate partner violence        Fear of current or ex partner: Not on file        Emotionally abused: Not on file        Physically abused: Not on file        Forced sexual activity: hot flashes  Eyes:  denies blurred or double vision  Cardiovascular:  denies chest pain or palpitations  Respiratory:  denies shortness of breath  Gastrointestinal:  denies heartburn, abdominal pain, diarrhea or constipation  Genitourinary:  denies dysuria Future    Breast cancer screening by mammogram  -     TOMY SCREENING BILAT (CPT=77067);  Future

## 2020-11-19 ENCOUNTER — HOSPITAL ENCOUNTER (OUTPATIENT)
Dept: MAMMOGRAPHY | Age: 40
Discharge: HOME | End: 2020-11-19
Attending: OBSTETRICS & GYNECOLOGY
Payer: COMMERCIAL

## 2020-11-19 ENCOUNTER — LAB ENCOUNTER (OUTPATIENT)
Dept: LAB | Age: 40
End: 2020-11-19
Attending: EMERGENCY MEDICINE
Payer: COMMERCIAL

## 2020-11-19 DIAGNOSIS — E55.9 VITAMIN D DEFICIENCY: ICD-10-CM

## 2020-11-19 DIAGNOSIS — E11.69 TYPE 2 DIABETES MELLITUS WITH HYPERCHOLESTEROLEMIA (HCC): ICD-10-CM

## 2020-11-19 DIAGNOSIS — E11.69 DIABETES MELLITUS TYPE 2 IN OBESE (HCC): ICD-10-CM

## 2020-11-19 DIAGNOSIS — E78.00 TYPE 2 DIABETES MELLITUS WITH HYPERCHOLESTEROLEMIA (HCC): ICD-10-CM

## 2020-11-19 DIAGNOSIS — E66.9 DIABETES MELLITUS TYPE 2 IN OBESE (HCC): ICD-10-CM

## 2020-11-19 DIAGNOSIS — Z12.31 BREAST CANCER SCREENING BY MAMMOGRAM: ICD-10-CM

## 2020-11-19 PROCEDURE — 77067 SCR MAMMO BI INCL CAD: CPT | Performed by: OBSTETRICS & GYNECOLOGY

## 2020-11-19 PROCEDURE — 82306 VITAMIN D 25 HYDROXY: CPT | Performed by: EMERGENCY MEDICINE

## 2020-11-19 PROCEDURE — 80061 LIPID PANEL: CPT | Performed by: EMERGENCY MEDICINE

## 2020-11-19 PROCEDURE — 83036 HEMOGLOBIN GLYCOSYLATED A1C: CPT | Performed by: EMERGENCY MEDICINE

## 2020-11-19 PROCEDURE — 80053 COMPREHEN METABOLIC PANEL: CPT | Performed by: EMERGENCY MEDICINE

## 2020-11-19 PROCEDURE — 36415 COLL VENOUS BLD VENIPUNCTURE: CPT | Performed by: EMERGENCY MEDICINE

## 2020-11-21 ENCOUNTER — OFFICE VISIT (OUTPATIENT)
Dept: FAMILY MEDICINE CLINIC | Facility: CLINIC | Age: 40
End: 2020-11-21
Payer: COMMERCIAL

## 2020-11-21 VITALS
WEIGHT: 183 LBS | DIASTOLIC BLOOD PRESSURE: 84 MMHG | RESPIRATION RATE: 15 BRPM | OXYGEN SATURATION: 98 % | TEMPERATURE: 98 F | HEART RATE: 117 BPM | SYSTOLIC BLOOD PRESSURE: 126 MMHG | BODY MASS INDEX: 35 KG/M2

## 2020-11-21 DIAGNOSIS — H53.9 VISUAL CHANGES: ICD-10-CM

## 2020-11-21 DIAGNOSIS — F41.1 ANXIETY AS ACUTE REACTION TO GROSS STRESS: ICD-10-CM

## 2020-11-21 DIAGNOSIS — E78.00 TYPE 2 DIABETES MELLITUS WITH HYPERCHOLESTEROLEMIA (HCC): Primary | ICD-10-CM

## 2020-11-21 DIAGNOSIS — F43.0 ANXIETY AS ACUTE REACTION TO GROSS STRESS: ICD-10-CM

## 2020-11-21 DIAGNOSIS — E55.9 VITAMIN D DEFICIENCY: ICD-10-CM

## 2020-11-21 DIAGNOSIS — E11.69 TYPE 2 DIABETES MELLITUS WITH HYPERCHOLESTEROLEMIA (HCC): Primary | ICD-10-CM

## 2020-11-21 PROCEDURE — 3079F DIAST BP 80-89 MM HG: CPT | Performed by: EMERGENCY MEDICINE

## 2020-11-21 PROCEDURE — 3074F SYST BP LT 130 MM HG: CPT | Performed by: EMERGENCY MEDICINE

## 2020-11-21 PROCEDURE — 99214 OFFICE O/P EST MOD 30 MIN: CPT | Performed by: EMERGENCY MEDICINE

## 2020-11-21 RX ORDER — ERGOCALCIFEROL 1.25 MG/1
50000 CAPSULE ORAL WEEKLY
Qty: 12 CAPSULE | Refills: 0 | Status: SHIPPED | OUTPATIENT
Start: 2020-11-21 | End: 2021-04-19 | Stop reason: ALTCHOICE

## 2020-11-21 NOTE — PROGRESS NOTES
Chief Complaint:   Patient presents with:  Diabetes: F/u     HPI:   This is a 36year old female         DIABETES    Stopped MEtformin 1-2 months ago. Has not checked blood sugars since then. COntinues with statin.   No blood sugar checks states that she • Diabetes Paternal Grandmother    • Heart Disorder Paternal Grandmother    • Diabetes Paternal Grandfather    • Heart Disorder Paternal Grandfather    • No Known Problems Daughter    • No Known Problems Son      Allergies:  No Known Allergies  Current Normoactive BS. No palpable masses no guarding rigidity no rebound tenderness          MENTAL STATUS EXAMINATION: The patient is alert and oriented. Dress and hygiene are fair. Looks his stated age. Anxious cooperative. Good eye contact.  No psychomotor ag Provider:  Isai Al DO       Collected:           11/13/2020 10:53 AM          Ordering Location:     [facility]       Received:            11/16/2020 12:38 PM                                 Kike mellitus with hypercholesterolemia ([facility] [address]  Patient has stopped metformin approximately 2 months ago because of her fear that it can cause cancer. Patient reassured that this is not the case but patient is adamant that she does not want to restart this.   He diabetes  6. Continue with weight loss  7. Relaxation techniques  8. Follow up in 3 months  9. Arrange for diabetic eye exam  10.  Follow up with dermatology      FOLLOW UP: 3  months        I spent a total of 25 mins with the patient, greater than 50% of t

## 2020-11-21 NOTE — PATIENT INSTRUCTIONS
Thank you for choosing [facility]  To Do:  FOR TAI HARDY        1. Take High dose vit d once a week, stop the over the counter Vit D supplement temporarily  2. Repeat Vit D levels in 3 months  3.  Arrange for therapy and counseling, Makenzie Lane wydajniej wykonywa? codzienne obowi?zki. A albertina?e lepiej przygotujesz si? psychicznie do walki ze stresem. Od?ywiaj si? prawid?owo  Cz?sto w reakcji na stres si?gamy po paczk? chipsów czy Charlie Guidry.  Mo?e to da? krótjosé miguel?y maida hebert na d?u?s brennan. Magdalena l?chitra  L?k to uczucie które nam towarzyszy, gdy my?limy, ?e mog?o wydarzy? si? co? z?ego. To normalna odpowied? na stres i ignacio wywo?uje jedynie ?agodn? reakccarly? Linda Vidal l?k si? nasili, mo?e beatriz?óca? nasze codzienne ?laine.  BARBARA almanza czujesz si? przeci? ?fredy). · Niestety wielu sytuacji stresowych rachel da si? unikn??. Wa?ne randell lugo? si?, anne radzi? fe zazueta ze stresem. Rox gonzales, ktnaveen pozwol? ograniczy? twój l?k. Nale? ? do zahra proste barb, sixto rodríguez ?

## 2021-01-09 DIAGNOSIS — E66.9 DIABETES MELLITUS TYPE 2 IN OBESE (HCC): ICD-10-CM

## 2021-01-09 DIAGNOSIS — E11.69 DIABETES MELLITUS TYPE 2 IN OBESE (HCC): ICD-10-CM

## 2021-01-09 RX ORDER — ATORVASTATIN CALCIUM 10 MG/1
TABLET, FILM COATED ORAL
Qty: 30 TABLET | Refills: 0 | Status: SHIPPED | OUTPATIENT
Start: 2021-01-09 | End: 2021-04-19

## 2021-02-04 ENCOUNTER — TELEPHONE (OUTPATIENT)
Dept: FAMILY MEDICINE CLINIC | Facility: CLINIC | Age: 41
End: 2021-02-04

## 2021-04-07 ENCOUNTER — LAB ENCOUNTER (OUTPATIENT)
Dept: LAB | Age: 41
End: 2021-04-07
Attending: EMERGENCY MEDICINE
Payer: COMMERCIAL

## 2021-04-07 DIAGNOSIS — R17 TOTAL BILIRUBIN, ELEVATED: Primary | ICD-10-CM

## 2021-04-07 DIAGNOSIS — E55.9 VITAMIN D DEFICIENCY: ICD-10-CM

## 2021-04-07 DIAGNOSIS — R17 TOTAL BILIRUBIN, ELEVATED: ICD-10-CM

## 2021-04-07 PROCEDURE — 82306 VITAMIN D 25 HYDROXY: CPT | Performed by: EMERGENCY MEDICINE

## 2021-04-07 PROCEDURE — 80053 COMPREHEN METABOLIC PANEL: CPT | Performed by: EMERGENCY MEDICINE

## 2021-04-07 PROCEDURE — 36415 COLL VENOUS BLD VENIPUNCTURE: CPT | Performed by: EMERGENCY MEDICINE

## 2021-04-07 NOTE — PROGRESS NOTES
Pt presented to office for labs and requested her chart be checked to see if additional labs need to be ordered, states she is fasting. LOV 11/21/2020 PCP noted:     1. Repeat blood test in 1 month for elevated bilirubin. Order placed.  Note stated

## 2021-04-08 ENCOUNTER — IMMUNIZATION (OUTPATIENT)
Dept: LAB | Facility: HOSPITAL | Age: 41
End: 2021-04-08
Attending: HOSPITALIST
Payer: OTHER GOVERNMENT

## 2021-04-08 DIAGNOSIS — Z23 NEED FOR VACCINATION: Primary | ICD-10-CM

## 2021-04-08 PROCEDURE — 0011A SARSCOV2 VAC 100MCG/0.5ML IM: CPT

## 2021-04-12 ENCOUNTER — TELEPHONE (OUTPATIENT)
Dept: FAMILY MEDICINE CLINIC | Facility: CLINIC | Age: 41
End: 2021-04-12

## 2021-04-12 NOTE — TELEPHONE ENCOUNTER
Spoke w/ Real Marivel. Pt's mother recently passed away and she is having a very difficult time sleeping. She is requesting something to help. Has appt scheduled for 4/19. Please advise, thanks.

## 2021-04-13 ENCOUNTER — TELEPHONE (OUTPATIENT)
Dept: FAMILY MEDICINE CLINIC | Facility: CLINIC | Age: 41
End: 2021-04-13

## 2021-04-13 RX ORDER — HYDROXYZINE HYDROCHLORIDE 25 MG/1
TABLET, FILM COATED ORAL 3 TIMES DAILY PRN
Qty: 60 TABLET | Refills: 1 | Status: SHIPPED | OUTPATIENT
Start: 2021-04-13 | End: 2021-11-23

## 2021-04-19 ENCOUNTER — OFFICE VISIT (OUTPATIENT)
Dept: FAMILY MEDICINE CLINIC | Facility: CLINIC | Age: 41
End: 2021-04-19
Payer: COMMERCIAL

## 2021-04-19 VITALS
DIASTOLIC BLOOD PRESSURE: 92 MMHG | OXYGEN SATURATION: 98 % | TEMPERATURE: 98 F | SYSTOLIC BLOOD PRESSURE: 136 MMHG | WEIGHT: 191 LBS | RESPIRATION RATE: 17 BRPM | HEART RATE: 111 BPM | BODY MASS INDEX: 36 KG/M2

## 2021-04-19 DIAGNOSIS — F43.0 ANXIETY AS ACUTE REACTION TO GROSS STRESS: ICD-10-CM

## 2021-04-19 DIAGNOSIS — Z13.0 SCREENING FOR ENDOCRINE, NUTRITIONAL, METABOLIC AND IMMUNITY DISORDER: ICD-10-CM

## 2021-04-19 DIAGNOSIS — R03.0 BLOOD PRESSURE ELEVATED WITHOUT HISTORY OF HTN: ICD-10-CM

## 2021-04-19 DIAGNOSIS — Z13.29 SCREENING FOR ENDOCRINE, NUTRITIONAL, METABOLIC AND IMMUNITY DISORDER: ICD-10-CM

## 2021-04-19 DIAGNOSIS — Z13.21 SCREENING FOR ENDOCRINE, NUTRITIONAL, METABOLIC AND IMMUNITY DISORDER: ICD-10-CM

## 2021-04-19 DIAGNOSIS — Z13.228 SCREENING FOR ENDOCRINE, NUTRITIONAL, METABOLIC AND IMMUNITY DISORDER: ICD-10-CM

## 2021-04-19 DIAGNOSIS — F41.1 ANXIETY AS ACUTE REACTION TO GROSS STRESS: ICD-10-CM

## 2021-04-19 DIAGNOSIS — E66.9 DIABETES MELLITUS TYPE 2 IN OBESE (HCC): Primary | ICD-10-CM

## 2021-04-19 DIAGNOSIS — E11.69 DIABETES MELLITUS TYPE 2 IN OBESE (HCC): Primary | ICD-10-CM

## 2021-04-19 PROCEDURE — 3080F DIAST BP >= 90 MM HG: CPT | Performed by: EMERGENCY MEDICINE

## 2021-04-19 PROCEDURE — 99214 OFFICE O/P EST MOD 30 MIN: CPT | Performed by: EMERGENCY MEDICINE

## 2021-04-19 PROCEDURE — 3075F SYST BP GE 130 - 139MM HG: CPT | Performed by: EMERGENCY MEDICINE

## 2021-04-19 PROCEDURE — 83036 HEMOGLOBIN GLYCOSYLATED A1C: CPT | Performed by: EMERGENCY MEDICINE

## 2021-04-19 PROCEDURE — 3051F HG A1C>EQUAL 7.0%<8.0%: CPT | Performed by: NURSE PRACTITIONER

## 2021-04-19 RX ORDER — METFORMIN HYDROCHLORIDE 500 MG/1
500 TABLET, EXTENDED RELEASE ORAL 2 TIMES DAILY WITH MEALS
Qty: 180 TABLET | Refills: 0 | Status: SHIPPED | OUTPATIENT
Start: 2021-04-19 | End: 2021-11-23

## 2021-04-19 RX ORDER — ATORVASTATIN CALCIUM 10 MG/1
10 TABLET, FILM COATED ORAL NIGHTLY
Qty: 90 TABLET | Refills: 0 | Status: SHIPPED | OUTPATIENT
Start: 2021-04-19 | End: 2021-11-23

## 2021-04-19 NOTE — PROGRESS NOTES
Chief Complaint:   Patient presents with:  Diabetes: F/u     HPI:   This is a 36year old female         DIABETES  Stopped Metformin 6 months ago  On Metformin and Atorvastatin. + weight gain  Stressed over recent death of her mom 1 week ago.   Admits to List:     Leukocytosis     Diabetes mellitus type 2 in obese (HCC)     Obesity (BMI 30-39. 9)     Refused pneumococcal vaccine     Refused influenza vaccine        REVIEW OF SYSTEMS:   Review of systems significant for anxiety  The rest of the review of sys Alkaline Phosphatase 49 37 - 98 U/L    Bilirubin, Total 1.6 0.1 - 2.0 mg/dL    Total Protein 7.9 6.4 - 8.2 g/dL    Albumin 4.0 3.4 - 5.0 g/dL    Globulin  3.9 2.8 - 4.4 g/dL    A/G Ratio 1.0 1.0 - 2.0    FASTING Yes    HEMOGLOBIN A1C    Collection Time: 04

## 2021-04-19 NOTE — PATIENT INSTRUCTIONS
Thank you for choosing [address] [place] [address] Group  To Do:  FOR TAI HARDY        1. Restart metformin  2. Continue with atorvastatin  3. Continue with therapy and counseling  4. Continue with Hydroxyzine as needed  5. Follow up in 3 months  6.  Stick to ?atwo zapomnie? o prawdziwych Sisi Guerrero? ciach i celach. Sarah Beth temu zapobiec, warto si? dowiedzie?, co jest w ?yciu najwa?niedeonte. Kirsten grey: „Czego by henrietta khan?o, gdybym musia? (a) kourtney? ? nowe ?ycie w innym miejscu? Pracy?  Ronnell rojas p pojawi? zupe?rachel bez przyczyny. Desmond? te? tendencj? do cz?stego powracania. Mog? Lucy Gitelman? mo?liwo?? radzenia sobie w sytuacjach ?yciowych i powodowa? olbrzymie cierpienia psychiczne. W wyniku powy?ronnieeggavino welch dotkni?ta t? przypad? o?ci? b?hamlet Petersen? Mohsen Smith obawia?, co sobie pomy?l? inni. Sorin Vanessa? ? objawów l?ków mo?na z?agodzi? Charliene Holstein l?calvin (anxiety disorder) to jayson, czego nale? a?cristy si? wstydzi? Arenas Rita Mosley??nina rome (treatment) ? ?will cole (medication) z psychoterapi? (therapy).  Cho

## 2021-04-22 ENCOUNTER — LAB ENCOUNTER (OUTPATIENT)
Dept: LAB | Age: 41
End: 2021-04-22
Attending: EMERGENCY MEDICINE
Payer: COMMERCIAL

## 2021-04-22 DIAGNOSIS — Z13.21 SCREENING FOR ENDOCRINE, NUTRITIONAL, METABOLIC AND IMMUNITY DISORDER: ICD-10-CM

## 2021-04-22 DIAGNOSIS — E66.9 DIABETES MELLITUS TYPE 2 IN OBESE (HCC): ICD-10-CM

## 2021-04-22 DIAGNOSIS — E11.69 DIABETES MELLITUS TYPE 2 IN OBESE (HCC): ICD-10-CM

## 2021-04-22 DIAGNOSIS — Z13.29 SCREENING FOR ENDOCRINE, NUTRITIONAL, METABOLIC AND IMMUNITY DISORDER: ICD-10-CM

## 2021-04-22 DIAGNOSIS — Z13.0 SCREENING FOR ENDOCRINE, NUTRITIONAL, METABOLIC AND IMMUNITY DISORDER: ICD-10-CM

## 2021-04-22 DIAGNOSIS — Z13.228 SCREENING FOR ENDOCRINE, NUTRITIONAL, METABOLIC AND IMMUNITY DISORDER: ICD-10-CM

## 2021-04-22 PROCEDURE — 85025 COMPLETE CBC W/AUTO DIFF WBC: CPT | Performed by: EMERGENCY MEDICINE

## 2021-04-22 PROCEDURE — 80061 LIPID PANEL: CPT | Performed by: EMERGENCY MEDICINE

## 2021-04-22 PROCEDURE — 82570 ASSAY OF URINE CREATININE: CPT | Performed by: EMERGENCY MEDICINE

## 2021-04-22 PROCEDURE — 84443 ASSAY THYROID STIM HORMONE: CPT | Performed by: EMERGENCY MEDICINE

## 2021-04-22 PROCEDURE — 3061F NEG MICROALBUMINURIA REV: CPT | Performed by: EMERGENCY MEDICINE

## 2021-04-22 PROCEDURE — 82043 UR ALBUMIN QUANTITATIVE: CPT | Performed by: EMERGENCY MEDICINE

## 2021-04-22 PROCEDURE — 3061F NEG MICROALBUMINURIA REV: CPT | Performed by: NURSE PRACTITIONER

## 2021-04-23 ENCOUNTER — TELEPHONE (OUTPATIENT)
Dept: FAMILY MEDICINE CLINIC | Facility: CLINIC | Age: 41
End: 2021-04-23

## 2021-04-29 ENCOUNTER — TELEPHONE (OUTPATIENT)
Dept: FAMILY MEDICINE CLINIC | Facility: CLINIC | Age: 41
End: 2021-04-29

## 2021-05-06 ENCOUNTER — IMMUNIZATION (OUTPATIENT)
Dept: LAB | Facility: HOSPITAL | Age: 41
End: 2021-05-06
Attending: EMERGENCY MEDICINE
Payer: OTHER GOVERNMENT

## 2021-05-06 DIAGNOSIS — Z23 NEED FOR VACCINATION: Primary | ICD-10-CM

## 2021-05-06 PROCEDURE — 0012A SARSCOV2 VAC 100MCG/0.5ML IM: CPT

## 2021-11-18 RX ORDER — METFORMIN HYDROCHLORIDE 500 MG/1
500 TABLET, EXTENDED RELEASE ORAL 2 TIMES DAILY WITH MEALS
Qty: 180 TABLET | Refills: 0 | OUTPATIENT
Start: 2021-11-18

## 2021-11-23 ENCOUNTER — OFFICE VISIT (OUTPATIENT)
Dept: FAMILY MEDICINE CLINIC | Facility: CLINIC | Age: 41
End: 2021-11-23
Payer: COMMERCIAL

## 2021-11-23 VITALS
WEIGHT: 188 LBS | SYSTOLIC BLOOD PRESSURE: 122 MMHG | HEIGHT: 61 IN | BODY MASS INDEX: 35.5 KG/M2 | RESPIRATION RATE: 16 BRPM | HEART RATE: 88 BPM | DIASTOLIC BLOOD PRESSURE: 80 MMHG | OXYGEN SATURATION: 98 %

## 2021-11-23 DIAGNOSIS — E11.69 TYPE 2 DIABETES MELLITUS WITH HYPERCHOLESTEROLEMIA (HCC): Primary | ICD-10-CM

## 2021-11-23 DIAGNOSIS — E78.00 TYPE 2 DIABETES MELLITUS WITH HYPERCHOLESTEROLEMIA (HCC): Primary | ICD-10-CM

## 2021-11-23 DIAGNOSIS — J40 BRONCHITIS: ICD-10-CM

## 2021-11-23 PROCEDURE — 3074F SYST BP LT 130 MM HG: CPT | Performed by: NURSE PRACTITIONER

## 2021-11-23 PROCEDURE — 99214 OFFICE O/P EST MOD 30 MIN: CPT | Performed by: NURSE PRACTITIONER

## 2021-11-23 PROCEDURE — 3008F BODY MASS INDEX DOCD: CPT | Performed by: NURSE PRACTITIONER

## 2021-11-23 PROCEDURE — 3079F DIAST BP 80-89 MM HG: CPT | Performed by: NURSE PRACTITIONER

## 2021-11-23 RX ORDER — METFORMIN HYDROCHLORIDE 750 MG/1
750 TABLET, EXTENDED RELEASE ORAL DAILY
Qty: 90 TABLET | Refills: 1 | Status: SHIPPED | OUTPATIENT
Start: 2021-11-23

## 2021-11-23 RX ORDER — AZITHROMYCIN 250 MG/1
TABLET, FILM COATED ORAL
Qty: 6 TABLET | Refills: 0 | Status: SHIPPED | OUTPATIENT
Start: 2021-11-23 | End: 2021-11-28

## 2021-11-23 RX ORDER — METFORMIN HYDROCHLORIDE 500 MG/1
500 TABLET, EXTENDED RELEASE ORAL 2 TIMES DAILY WITH MEALS
Qty: 180 TABLET | Refills: 0 | Status: CANCELLED | OUTPATIENT
Start: 2021-11-23

## 2021-11-23 NOTE — PROGRESS NOTES
Subjective:   Andrew Barber is a 39year old female who presents for Cough (dry cough) and Medication Follow-Up   Presents for medication refill for metFORMIN. States that the blood sugar is still elevated.   States that she went to [place] at the end appearance. HENT:      Head: Normocephalic.       Right Ear: Tympanic membrane normal.      Left Ear: Tympanic membrane normal.      Nose: Nose normal.      Mouth/Throat:      Mouth: Mucous membranes are moist.   Cardiovascular:      Rate and Rhythm: Norm

## 2021-11-23 NOTE — PATIENT INSTRUCTIONS
Understanding Carbohydrates  A car needs the right type of fuel to run. And you need the right kind of food to function. To keep your energy level up, your body needs food that has carbohydrates.  But carbs raise blood sugar levels higher and faster than meal, depending on your case. Carb counting is a system that helps you keep track of the carbohydrates you eat at each meal.   Carbs come from a variety of foods. These include grains, starchy vegetables, fruit, milk, beans, and snack foods.  You can either sizes.  · Compare labels. Compare the labels of different products. Look at serving sizes and total carbs to find the products that work best for you.   · Don't forget protein and fat.  With the focus on carb counting, it might be easy to forget protein and

## 2022-01-04 ENCOUNTER — IMMUNIZATION (OUTPATIENT)
Dept: LAB | Facility: HOSPITAL | Age: 42
End: 2022-01-04
Attending: EMERGENCY MEDICINE
Payer: OTHER GOVERNMENT

## 2022-01-04 DIAGNOSIS — Z23 NEED FOR VACCINATION: Primary | ICD-10-CM

## 2022-01-04 PROCEDURE — 0064A SARSCOV2 VAC 50MCG/0.25ML IM: CPT

## 2022-01-20 ENCOUNTER — LAB ENCOUNTER (OUTPATIENT)
Dept: LAB | Age: 42
End: 2022-01-20
Attending: NURSE PRACTITIONER
Payer: COMMERCIAL

## 2022-01-20 DIAGNOSIS — E11.69 TYPE 2 DIABETES MELLITUS WITH HYPERCHOLESTEROLEMIA (HCC): ICD-10-CM

## 2022-01-20 DIAGNOSIS — Z13.21 ENCOUNTER FOR VITAMIN DEFICIENCY SCREENING: ICD-10-CM

## 2022-01-20 DIAGNOSIS — E78.00 TYPE 2 DIABETES MELLITUS WITH HYPERCHOLESTEROLEMIA (HCC): ICD-10-CM

## 2022-01-20 LAB
ALBUMIN SERPL-MCNC: 3.9 G/DL (ref 3.4–5)
ALBUMIN/GLOB SERPL: 1.1 {RATIO} (ref 1–2)
ALP LIVER SERPL-CCNC: 47 U/L
ALT SERPL-CCNC: 36 U/L
ANION GAP SERPL CALC-SCNC: 8 MMOL/L (ref 0–18)
AST SERPL-CCNC: 19 U/L (ref 15–37)
BILIRUB SERPL-MCNC: 1.6 MG/DL (ref 0.1–2)
BUN BLD-MCNC: 17 MG/DL (ref 7–18)
CALCIUM BLD-MCNC: 9.7 MG/DL (ref 8.5–10.1)
CHLORIDE SERPL-SCNC: 105 MMOL/L (ref 98–112)
CHOLEST SERPL-MCNC: 187 MG/DL (ref ?–200)
CO2 SERPL-SCNC: 27 MMOL/L (ref 21–32)
CREAT BLD-MCNC: 0.88 MG/DL
EST. AVERAGE GLUCOSE BLD GHB EST-MCNC: 146 MG/DL (ref 68–126)
FASTING PATIENT LIPID ANSWER: YES
FASTING STATUS PATIENT QL REPORTED: YES
GLOBULIN PLAS-MCNC: 3.6 G/DL (ref 2.8–4.4)
GLUCOSE BLD-MCNC: 136 MG/DL (ref 70–99)
HBA1C MFR BLD: 6.7 % (ref ?–5.7)
HDLC SERPL-MCNC: 42 MG/DL (ref 40–59)
LDLC SERPL CALC-MCNC: 89 MG/DL (ref ?–100)
NONHDLC SERPL-MCNC: 145 MG/DL (ref ?–130)
OSMOLALITY SERPL CALC.SUM OF ELEC: 294 MOSM/KG (ref 275–295)
POTASSIUM SERPL-SCNC: 4.7 MMOL/L (ref 3.5–5.1)
PROT SERPL-MCNC: 7.5 G/DL (ref 6.4–8.2)
SODIUM SERPL-SCNC: 140 MMOL/L (ref 136–145)
TRIGL SERPL-MCNC: 337 MG/DL (ref 30–149)
VLDLC SERPL CALC-MCNC: 55 MG/DL (ref 0–30)

## 2022-01-20 PROCEDURE — 83036 HEMOGLOBIN GLYCOSYLATED A1C: CPT | Performed by: NURSE PRACTITIONER

## 2022-01-20 PROCEDURE — 3044F HG A1C LEVEL LT 7.0%: CPT | Performed by: EMERGENCY MEDICINE

## 2022-01-20 PROCEDURE — 80053 COMPREHEN METABOLIC PANEL: CPT | Performed by: NURSE PRACTITIONER

## 2022-01-20 PROCEDURE — 84443 ASSAY THYROID STIM HORMONE: CPT | Performed by: NURSE PRACTITIONER

## 2022-01-20 PROCEDURE — 82306 VITAMIN D 25 HYDROXY: CPT | Performed by: NURSE PRACTITIONER

## 2022-01-20 PROCEDURE — 80061 LIPID PANEL: CPT | Performed by: NURSE PRACTITIONER

## 2022-01-21 ENCOUNTER — TELEPHONE (OUTPATIENT)
Dept: FAMILY MEDICINE CLINIC | Facility: CLINIC | Age: 42
End: 2022-01-21

## 2022-01-21 DIAGNOSIS — E78.2 ELEVATED TRIGLYCERIDES WITH HIGH CHOLESTEROL: Primary | ICD-10-CM

## 2022-01-21 LAB
TSI SER-ACNC: 2.1 MIU/ML (ref 0.36–3.74)
VIT D+METAB SERPL-MCNC: 16.4 NG/ML (ref 30–100)

## 2022-01-21 NOTE — TELEPHONE ENCOUNTER
----- Message from IRMA Park sent at 1/21/2022  7:48 AM CST -----  Labs are good , diabetes controlled , triglycerides are elevated -sent Omega 3 tabs take it daily will repeat Lipid panel in 3 months

## 2022-02-10 ENCOUNTER — TELEPHONE (OUTPATIENT)
Dept: FAMILY MEDICINE CLINIC | Facility: CLINIC | Age: 42
End: 2022-02-10

## 2022-02-18 ENCOUNTER — OFFICE VISIT (OUTPATIENT)
Dept: OBGYN CLINIC | Facility: CLINIC | Age: 42
End: 2022-02-18
Payer: COMMERCIAL

## 2022-02-18 VITALS
WEIGHT: 177 LBS | DIASTOLIC BLOOD PRESSURE: 78 MMHG | HEART RATE: 116 BPM | SYSTOLIC BLOOD PRESSURE: 120 MMHG | BODY MASS INDEX: 33.42 KG/M2 | HEIGHT: 61 IN

## 2022-02-18 DIAGNOSIS — Z12.31 BREAST CANCER SCREENING BY MAMMOGRAM: ICD-10-CM

## 2022-02-18 DIAGNOSIS — Z01.419 ENCOUNTER FOR WELL WOMAN EXAM WITH ROUTINE GYNECOLOGICAL EXAM: Primary | ICD-10-CM

## 2022-02-18 DIAGNOSIS — Z12.4 CERVICAL CANCER SCREENING: ICD-10-CM

## 2022-02-18 PROCEDURE — 88175 CYTOPATH C/V AUTO FLUID REDO: CPT | Performed by: OBSTETRICS & GYNECOLOGY

## 2022-02-18 PROCEDURE — 3074F SYST BP LT 130 MM HG: CPT | Performed by: OBSTETRICS & GYNECOLOGY

## 2022-02-18 PROCEDURE — 3008F BODY MASS INDEX DOCD: CPT | Performed by: OBSTETRICS & GYNECOLOGY

## 2022-02-18 PROCEDURE — 3078F DIAST BP <80 MM HG: CPT | Performed by: OBSTETRICS & GYNECOLOGY

## 2022-02-18 PROCEDURE — 87624 HPV HI-RISK TYP POOLED RSLT: CPT | Performed by: OBSTETRICS & GYNECOLOGY

## 2022-02-18 PROCEDURE — 99396 PREV VISIT EST AGE 40-64: CPT | Performed by: OBSTETRICS & GYNECOLOGY

## 2022-02-21 LAB — HPV I/H RISK 1 DNA SPEC QL NAA+PROBE: NEGATIVE

## 2022-04-11 ENCOUNTER — LAB ENCOUNTER (OUTPATIENT)
Dept: LAB | Age: 42
End: 2022-04-11
Attending: EMERGENCY MEDICINE
Payer: COMMERCIAL

## 2022-04-11 ENCOUNTER — OFFICE VISIT (OUTPATIENT)
Dept: FAMILY MEDICINE CLINIC | Facility: CLINIC | Age: 42
End: 2022-04-11
Payer: COMMERCIAL

## 2022-04-11 VITALS
OXYGEN SATURATION: 100 % | RESPIRATION RATE: 15 BRPM | WEIGHT: 166 LBS | HEART RATE: 105 BPM | DIASTOLIC BLOOD PRESSURE: 80 MMHG | SYSTOLIC BLOOD PRESSURE: 118 MMHG | HEIGHT: 61 IN | BODY MASS INDEX: 31.34 KG/M2

## 2022-04-11 DIAGNOSIS — E78.2 ELEVATED TRIGLYCERIDES WITH HIGH CHOLESTEROL: ICD-10-CM

## 2022-04-11 DIAGNOSIS — E78.00 TYPE 2 DIABETES MELLITUS WITH HYPERCHOLESTEROLEMIA (HCC): Primary | ICD-10-CM

## 2022-04-11 DIAGNOSIS — E03.9 HYPOTHYROIDISM, UNSPECIFIED TYPE: ICD-10-CM

## 2022-04-11 DIAGNOSIS — E11.69 TYPE 2 DIABETES MELLITUS WITH HYPERCHOLESTEROLEMIA (HCC): Primary | ICD-10-CM

## 2022-04-11 LAB
ALBUMIN SERPL-MCNC: 4.1 G/DL (ref 3.4–5)
ALBUMIN/GLOB SERPL: 1.2 {RATIO} (ref 1–2)
ALP LIVER SERPL-CCNC: 46 U/L
ALT SERPL-CCNC: 34 U/L
ANION GAP SERPL CALC-SCNC: 4 MMOL/L (ref 0–18)
AST SERPL-CCNC: 21 U/L (ref 15–37)
BILIRUB SERPL-MCNC: 2 MG/DL (ref 0.1–2)
BUN BLD-MCNC: 20 MG/DL (ref 7–18)
CALCIUM BLD-MCNC: 9.4 MG/DL (ref 8.5–10.1)
CHLORIDE SERPL-SCNC: 107 MMOL/L (ref 98–112)
CHOLEST SERPL-MCNC: 159 MG/DL (ref ?–200)
CO2 SERPL-SCNC: 28 MMOL/L (ref 21–32)
CREAT BLD-MCNC: 0.82 MG/DL
CREAT UR-SCNC: 308 MG/DL
EST. AVERAGE GLUCOSE BLD GHB EST-MCNC: 117 MG/DL (ref 68–126)
FASTING PATIENT LIPID ANSWER: YES
FASTING STATUS PATIENT QL REPORTED: YES
GLOBULIN PLAS-MCNC: 3.3 G/DL (ref 2.8–4.4)
GLUCOSE BLD-MCNC: 97 MG/DL (ref 70–99)
HBA1C MFR BLD: 5.7 % (ref ?–5.7)
HDLC SERPL-MCNC: 47 MG/DL (ref 40–59)
LDLC SERPL CALC-MCNC: 87 MG/DL (ref ?–100)
MICROALBUMIN UR-MCNC: 2.27 MG/DL
MICROALBUMIN/CREAT 24H UR-RTO: 7.4 UG/MG (ref ?–30)
NONHDLC SERPL-MCNC: 112 MG/DL (ref ?–130)
OSMOLALITY SERPL CALC.SUM OF ELEC: 291 MOSM/KG (ref 275–295)
POTASSIUM SERPL-SCNC: 4.7 MMOL/L (ref 3.5–5.1)
PROT SERPL-MCNC: 7.4 G/DL (ref 6.4–8.2)
SODIUM SERPL-SCNC: 139 MMOL/L (ref 136–145)
THYROGLOB SERPL-MCNC: <15 U/ML (ref ?–60)
THYROPEROXIDASE AB SERPL-ACNC: 43 U/ML (ref ?–60)
TRIGL SERPL-MCNC: 143 MG/DL (ref 30–149)
TSI SER-ACNC: 2.86 MIU/ML (ref 0.36–3.74)
VLDLC SERPL CALC-MCNC: 23 MG/DL (ref 0–30)

## 2022-04-11 PROCEDURE — 80053 COMPREHEN METABOLIC PANEL: CPT

## 2022-04-11 PROCEDURE — 3008F BODY MASS INDEX DOCD: CPT | Performed by: EMERGENCY MEDICINE

## 2022-04-11 PROCEDURE — 80061 LIPID PANEL: CPT

## 2022-04-11 PROCEDURE — 3079F DIAST BP 80-89 MM HG: CPT | Performed by: EMERGENCY MEDICINE

## 2022-04-11 PROCEDURE — 3074F SYST BP LT 130 MM HG: CPT | Performed by: EMERGENCY MEDICINE

## 2022-04-11 PROCEDURE — 99214 OFFICE O/P EST MOD 30 MIN: CPT | Performed by: EMERGENCY MEDICINE

## 2022-04-11 RX ORDER — METFORMIN HYDROCHLORIDE 750 MG/1
750 TABLET, EXTENDED RELEASE ORAL DAILY
Qty: 90 TABLET | Refills: 1 | Status: CANCELLED | OUTPATIENT
Start: 2022-04-11

## 2022-04-12 ENCOUNTER — TELEPHONE (OUTPATIENT)
Dept: FAMILY MEDICINE CLINIC | Facility: CLINIC | Age: 42
End: 2022-04-12

## 2022-04-12 ENCOUNTER — PATIENT MESSAGE (OUTPATIENT)
Dept: FAMILY MEDICINE CLINIC | Facility: CLINIC | Age: 42
End: 2022-04-12

## 2022-04-12 NOTE — TELEPHONE ENCOUNTER
----- Message from IRMA Wayne sent at 4/12/2022 11:28 AM CDT -----  Cholesterol is good , increase water intake labs showing some dehydration

## 2022-04-12 NOTE — TELEPHONE ENCOUNTER
OK to stop metformin as discussed  HBA1C low    contoinue with blood sugar checks  Follow up in 3 months with OV for recheck  Will repeat HBA1C in office at that time  Rest of labs stable

## 2022-04-14 ENCOUNTER — HOSPITAL ENCOUNTER (OUTPATIENT)
Dept: MAMMOGRAPHY | Age: 42
Discharge: HOME | End: 2022-04-14
Attending: OBSTETRICS & GYNECOLOGY
Payer: COMMERCIAL

## 2022-04-14 DIAGNOSIS — Z12.31 BREAST CANCER SCREENING BY MAMMOGRAM: ICD-10-CM

## 2022-04-14 PROCEDURE — 77063 BREAST TOMOSYNTHESIS BI: CPT | Performed by: OBSTETRICS & GYNECOLOGY

## 2022-04-14 PROCEDURE — 77067 SCR MAMMO BI INCL CAD: CPT | Performed by: OBSTETRICS & GYNECOLOGY

## 2022-04-28 ENCOUNTER — HOSPITAL ENCOUNTER (OUTPATIENT)
Dept: ULTRASOUND IMAGING | Age: 42
Discharge: HOME | End: 2022-04-28
Attending: EMERGENCY MEDICINE
Payer: COMMERCIAL

## 2022-04-28 ENCOUNTER — HOSPITAL ENCOUNTER (OUTPATIENT)
Age: 42
Discharge: HOME | End: 2022-04-28
Payer: COMMERCIAL

## 2022-04-28 VITALS
RESPIRATION RATE: 18 BRPM | DIASTOLIC BLOOD PRESSURE: 83 MMHG | WEIGHT: 162 LBS | TEMPERATURE: 98 F | SYSTOLIC BLOOD PRESSURE: 148 MMHG | BODY MASS INDEX: 31.8 KG/M2 | HEIGHT: 60 IN | OXYGEN SATURATION: 98 % | HEART RATE: 95 BPM

## 2022-04-28 DIAGNOSIS — E03.9 HYPOTHYROIDISM, UNSPECIFIED TYPE: ICD-10-CM

## 2022-04-28 DIAGNOSIS — J06.9 VIRAL URI: Primary | ICD-10-CM

## 2022-04-28 DIAGNOSIS — S39.012A LOW BACK STRAIN, INITIAL ENCOUNTER: ICD-10-CM

## 2022-04-28 LAB
POCT INFLUENZA A: NEGATIVE
POCT INFLUENZA B: NEGATIVE
SARS-COV-2 RNA RESP QL NAA+PROBE: NOT DETECTED

## 2022-04-28 PROCEDURE — U0002 COVID-19 LAB TEST NON-CDC: HCPCS | Performed by: NURSE PRACTITIONER

## 2022-04-28 PROCEDURE — 99213 OFFICE O/P EST LOW 20 MIN: CPT | Performed by: NURSE PRACTITIONER

## 2022-04-28 PROCEDURE — 87502 INFLUENZA DNA AMP PROBE: CPT | Performed by: NURSE PRACTITIONER

## 2022-04-28 PROCEDURE — 76536 US EXAM OF HEAD AND NECK: CPT | Performed by: EMERGENCY MEDICINE

## 2022-04-28 RX ORDER — ORPHENADRINE CITRATE 100 MG/1
100 TABLET, EXTENDED RELEASE ORAL 2 TIMES DAILY
Qty: 14 EACH | Refills: 0 | Status: SHIPPED | OUTPATIENT
Start: 2022-04-28 | End: 2022-05-05

## 2022-04-28 NOTE — ED INITIAL ASSESSMENT (HPI)
Sat Pt noted pain \"pins and needles\" on right sciatic area    Tues Pain continues, ear pain, chills, body aches    Hx of shingles.

## 2022-04-29 ENCOUNTER — PATIENT MESSAGE (OUTPATIENT)
Dept: FAMILY MEDICINE CLINIC | Facility: CLINIC | Age: 42
End: 2022-04-29

## 2022-04-29 ENCOUNTER — TELEPHONE (OUTPATIENT)
Dept: FAMILY MEDICINE CLINIC | Facility: CLINIC | Age: 42
End: 2022-04-29

## 2022-04-29 NOTE — TELEPHONE ENCOUNTER
Thyroid US negative  TSH WNL recent and in the past  Thyroid Antibodies neg  No evidence for hyperthyroidism or hypothyroidism  Unclear how patient was Dx with thyroid disease while in [place]  No need for medication at this time

## 2023-04-27 ENCOUNTER — TELEPHONE (OUTPATIENT)
Dept: FAMILY MEDICINE CLINIC | Facility: CLINIC | Age: 43
End: 2023-04-27

## 2023-04-27 ENCOUNTER — HOSPITAL ENCOUNTER (OUTPATIENT)
Dept: GENERAL RADIOLOGY | Age: 43
Discharge: HOME | End: 2023-04-27
Attending: NURSE PRACTITIONER
Payer: MEDICAID

## 2023-04-27 ENCOUNTER — OFFICE VISIT (OUTPATIENT)
Dept: FAMILY MEDICINE CLINIC | Facility: CLINIC | Age: 43
End: 2023-04-27
Payer: MEDICAID

## 2023-04-27 VITALS
TEMPERATURE: 98 F | DIASTOLIC BLOOD PRESSURE: 78 MMHG | SYSTOLIC BLOOD PRESSURE: 118 MMHG | HEART RATE: 93 BPM | RESPIRATION RATE: 16 BRPM | WEIGHT: 176 LBS | OXYGEN SATURATION: 98 % | HEIGHT: 60 IN | BODY MASS INDEX: 34.55 KG/M2

## 2023-04-27 DIAGNOSIS — E66.9 OBESITY (BMI 30-39.9): ICD-10-CM

## 2023-04-27 DIAGNOSIS — E03.9 ACQUIRED HYPOTHYROIDISM: ICD-10-CM

## 2023-04-27 DIAGNOSIS — Z87.59 HISTORY OF PRE-ECLAMPSIA: ICD-10-CM

## 2023-04-27 DIAGNOSIS — M25.551 CHRONIC RIGHT HIP PAIN: ICD-10-CM

## 2023-04-27 DIAGNOSIS — E11.69 DIABETES MELLITUS TYPE 2 IN OBESE (HCC): Primary | ICD-10-CM

## 2023-04-27 DIAGNOSIS — G89.29 CHRONIC RIGHT HIP PAIN: ICD-10-CM

## 2023-04-27 DIAGNOSIS — Z83.49 FAMILY HISTORY OF THYROID DISEASE: ICD-10-CM

## 2023-04-27 DIAGNOSIS — Z12.83 SKIN CANCER SCREENING: ICD-10-CM

## 2023-04-27 DIAGNOSIS — E66.9 DIABETES MELLITUS TYPE 2 IN OBESE (HCC): Primary | ICD-10-CM

## 2023-04-27 DIAGNOSIS — Z12.31 ENCOUNTER FOR SCREENING MAMMOGRAM FOR MALIGNANT NEOPLASM OF BREAST: ICD-10-CM

## 2023-04-27 LAB
ALBUMIN SERPL-MCNC: 4 G/DL (ref 3.4–5)
ALBUMIN/GLOB SERPL: 1 {RATIO} (ref 1–2)
ALP LIVER SERPL-CCNC: 48 U/L
ALT SERPL-CCNC: 33 U/L
ANION GAP SERPL CALC-SCNC: 4 MMOL/L (ref 0–18)
AST SERPL-CCNC: 20 U/L (ref 15–37)
BASOPHILS # BLD AUTO: 0.07 X10(3) UL (ref 0–0.2)
BASOPHILS NFR BLD AUTO: 0.8 %
BILIRUB SERPL-MCNC: 0.7 MG/DL (ref 0.1–2)
BUN BLD-MCNC: 25 MG/DL (ref 7–18)
CALCIUM BLD-MCNC: 8.8 MG/DL (ref 8.5–10.1)
CHLORIDE SERPL-SCNC: 110 MMOL/L (ref 98–112)
CHOLEST SERPL-MCNC: 215 MG/DL (ref ?–200)
CO2 SERPL-SCNC: 24 MMOL/L (ref 21–32)
CREAT BLD-MCNC: 0.75 MG/DL
CREAT UR-SCNC: 141 MG/DL
EOSINOPHIL # BLD AUTO: 0.2 X10(3) UL (ref 0–0.7)
EOSINOPHIL NFR BLD AUTO: 2.4 %
ERYTHROCYTE [DISTWIDTH] IN BLOOD BY AUTOMATED COUNT: 11.8 %
EST. AVERAGE GLUCOSE BLD GHB EST-MCNC: 134 MG/DL (ref 68–126)
FASTING PATIENT LIPID ANSWER: NO
FASTING STATUS PATIENT QL REPORTED: NO
GFR SERPLBLD BASED ON 1.73 SQ M-ARVRAT: 102 ML/MIN/1.73M2 (ref 60–?)
GLOBULIN PLAS-MCNC: 3.9 G/DL (ref 2.8–4.4)
GLUCOSE BLD-MCNC: 123 MG/DL (ref 70–99)
HBA1C MFR BLD: 6.3 % (ref ?–5.7)
HCT VFR BLD AUTO: 41.4 %
HDLC SERPL-MCNC: 43 MG/DL (ref 40–59)
HGB BLD-MCNC: 14.6 G/DL
IMM GRANULOCYTES # BLD AUTO: 0.02 X10(3) UL (ref 0–1)
IMM GRANULOCYTES NFR BLD: 0.2 %
LDLC SERPL CALC-MCNC: 101 MG/DL (ref ?–100)
LYMPHOCYTES # BLD AUTO: 2.58 X10(3) UL (ref 1–4)
LYMPHOCYTES NFR BLD AUTO: 30.6 %
MCH RBC QN AUTO: 31 PG (ref 26–34)
MCHC RBC AUTO-ENTMCNC: 35.3 G/DL (ref 31–37)
MCV RBC AUTO: 87.9 FL
MICROALBUMIN UR-MCNC: 2.99 MG/DL
MICROALBUMIN/CREAT 24H UR-RTO: 21.2 UG/MG (ref ?–30)
MONOCYTES # BLD AUTO: 0.63 X10(3) UL (ref 0.1–1)
MONOCYTES NFR BLD AUTO: 7.5 %
NEUTROPHILS # BLD AUTO: 4.93 X10 (3) UL (ref 1.5–7.7)
NEUTROPHILS # BLD AUTO: 4.93 X10(3) UL (ref 1.5–7.7)
NEUTROPHILS NFR BLD AUTO: 58.5 %
NONHDLC SERPL-MCNC: 172 MG/DL (ref ?–130)
OSMOLALITY SERPL CALC.SUM OF ELEC: 292 MOSM/KG (ref 275–295)
PLATELET # BLD AUTO: 263 10(3)UL (ref 150–450)
POTASSIUM SERPL-SCNC: 4.5 MMOL/L (ref 3.5–5.1)
PROT SERPL-MCNC: 7.9 G/DL (ref 6.4–8.2)
RBC # BLD AUTO: 4.71 X10(6)UL
SODIUM SERPL-SCNC: 138 MMOL/L (ref 136–145)
T4 FREE SERPL-MCNC: 0.9 NG/DL (ref 0.8–1.7)
TRIGL SERPL-MCNC: 421 MG/DL (ref 30–149)
TSI SER-ACNC: 4.39 MIU/ML (ref 0.36–3.74)
VLDLC SERPL CALC-MCNC: 72 MG/DL (ref 0–30)
WBC # BLD AUTO: 8.4 X10(3) UL (ref 4–11)

## 2023-04-27 PROCEDURE — 99214 OFFICE O/P EST MOD 30 MIN: CPT | Performed by: NURSE PRACTITIONER

## 2023-04-27 PROCEDURE — 3008F BODY MASS INDEX DOCD: CPT | Performed by: NURSE PRACTITIONER

## 2023-04-27 PROCEDURE — 3078F DIAST BP <80 MM HG: CPT | Performed by: NURSE PRACTITIONER

## 2023-04-27 PROCEDURE — 3074F SYST BP LT 130 MM HG: CPT | Performed by: NURSE PRACTITIONER

## 2023-04-27 PROCEDURE — 84439 ASSAY OF FREE THYROXINE: CPT | Performed by: NURSE PRACTITIONER

## 2023-04-27 PROCEDURE — 84443 ASSAY THYROID STIM HORMONE: CPT | Performed by: NURSE PRACTITIONER

## 2023-04-27 PROCEDURE — 85025 COMPLETE CBC W/AUTO DIFF WBC: CPT | Performed by: NURSE PRACTITIONER

## 2023-04-27 PROCEDURE — 82570 ASSAY OF URINE CREATININE: CPT | Performed by: NURSE PRACTITIONER

## 2023-04-27 PROCEDURE — 80053 COMPREHEN METABOLIC PANEL: CPT | Performed by: NURSE PRACTITIONER

## 2023-04-27 PROCEDURE — 73502 X-RAY EXAM HIP UNI 2-3 VIEWS: CPT | Performed by: NURSE PRACTITIONER

## 2023-04-27 PROCEDURE — 83036 HEMOGLOBIN GLYCOSYLATED A1C: CPT | Performed by: NURSE PRACTITIONER

## 2023-04-27 PROCEDURE — 80061 LIPID PANEL: CPT | Performed by: NURSE PRACTITIONER

## 2023-04-27 PROCEDURE — 82043 UR ALBUMIN QUANTITATIVE: CPT | Performed by: NURSE PRACTITIONER

## 2023-04-27 NOTE — TELEPHONE ENCOUNTER
----- Message from IRMA Dumont sent at 4/27/2023  2:53 PM CDT -----  Results reviewed. No acute problems.  Recommend seeing ortho as discussed for possible labral issue

## 2023-04-28 DIAGNOSIS — E78.5 HYPERLIPIDEMIA, UNSPECIFIED HYPERLIPIDEMIA TYPE: Primary | ICD-10-CM

## 2023-04-28 DIAGNOSIS — E66.9 DIABETES MELLITUS TYPE 2 IN OBESE (HCC): ICD-10-CM

## 2023-04-28 DIAGNOSIS — E78.00 TYPE 2 DIABETES MELLITUS WITH HYPERCHOLESTEROLEMIA (HCC): ICD-10-CM

## 2023-04-28 DIAGNOSIS — E11.69 TYPE 2 DIABETES MELLITUS WITH HYPERCHOLESTEROLEMIA (HCC): ICD-10-CM

## 2023-04-28 DIAGNOSIS — R79.89 ELEVATED TSH: ICD-10-CM

## 2023-04-28 DIAGNOSIS — E11.69 DIABETES MELLITUS TYPE 2 IN OBESE (HCC): ICD-10-CM

## 2023-04-28 RX ORDER — ATORVASTATIN CALCIUM 10 MG/1
10 TABLET, FILM COATED ORAL NIGHTLY
Qty: 90 TABLET | Refills: 0 | Status: SHIPPED | OUTPATIENT
Start: 2023-04-28

## 2023-04-28 RX ORDER — METFORMIN HYDROCHLORIDE 750 MG/1
750 TABLET, EXTENDED RELEASE ORAL DAILY
Qty: 90 TABLET | Refills: 0 | Status: SHIPPED | OUTPATIENT
Start: 2023-04-28

## 2023-04-28 NOTE — TELEPHONE ENCOUNTER
Patient agreeable to both metformin and cholesterol medication  Patient states she previously took atorvastatin and had no adverse reactions  Previous dose of metformin and atorvastatin pended  Reminders and orders placed for TSH, hgb A1C, and lipid panel in 3 months

## 2023-04-28 NOTE — TELEPHONE ENCOUNTER
----- Message from IRMA Martinez sent at 4/28/2023  8:16 AM CDT -----  A1C increased from previous. Consider adding Metformin back or working on diet/weight loss. Recheck in 3 mo for monitoring  Chemistries stable except for hyperglycemia  Cholesterol elevated. Would recommend starting statin but if she wants to work on diet can recheck in 3 mo for monitoring  TSH mildly increased. Recheck in 3 mo for monitoring.  Does not need medication at this point  Urine micro normal, recheck 1 year  No anemia

## 2023-05-02 ENCOUNTER — HOSPITAL ENCOUNTER (OUTPATIENT)
Dept: MAMMOGRAPHY | Age: 43
Discharge: HOME | End: 2023-05-02
Attending: NURSE PRACTITIONER
Payer: MEDICAID

## 2023-05-02 DIAGNOSIS — Z12.31 ENCOUNTER FOR SCREENING MAMMOGRAM FOR MALIGNANT NEOPLASM OF BREAST: ICD-10-CM

## 2023-05-02 PROCEDURE — 77067 SCR MAMMO BI INCL CAD: CPT | Performed by: NURSE PRACTITIONER

## 2023-05-02 PROCEDURE — 77063 BREAST TOMOSYNTHESIS BI: CPT | Performed by: NURSE PRACTITIONER

## 2023-05-03 ENCOUNTER — TELEPHONE (OUTPATIENT)
Dept: FAMILY MEDICINE CLINIC | Facility: CLINIC | Age: 43
End: 2023-05-03

## 2023-05-03 ENCOUNTER — TELEPHONE (OUTPATIENT)
Dept: ORTHOPEDICS CLINIC | Facility: CLINIC | Age: 43
End: 2023-05-03

## 2023-05-03 DIAGNOSIS — M25.551 RIGHT HIP PAIN: Primary | ICD-10-CM

## 2023-05-03 NOTE — TELEPHONE ENCOUNTER
Imaging was completed for this patient for a RT HIP, but it needs to be reviewed to see if additional imaging is needed. If so, please enter the appropriate RX and let the patient know to come in before their appointment to complete the additional imaging. Thank you!     Future Appointments   Date Time Provider Tarsha Sutherland   5/9/2023  9:30 AM Jaycee Andrade PA-C [facility]

## 2023-05-08 ENCOUNTER — HOSPITAL ENCOUNTER (OUTPATIENT)
Dept: GENERAL RADIOLOGY | Age: 43
Discharge: HOME | End: 2023-05-08
Attending: PHYSICIAN ASSISTANT
Payer: MEDICAID

## 2023-05-08 DIAGNOSIS — M25.551 RIGHT HIP PAIN: ICD-10-CM

## 2023-05-08 PROCEDURE — 73502 X-RAY EXAM HIP UNI 2-3 VIEWS: CPT | Performed by: PHYSICIAN ASSISTANT

## 2023-05-09 ENCOUNTER — OFFICE VISIT (OUTPATIENT)
Facility: CLINIC | Age: 43
End: 2023-05-09
Payer: MEDICAID

## 2023-05-09 VITALS — HEART RATE: 87 BPM | OXYGEN SATURATION: 99 % | HEIGHT: 60 IN | WEIGHT: 175 LBS | BODY MASS INDEX: 34.36 KG/M2

## 2023-05-09 DIAGNOSIS — M76.01 GLUTEAL TENDINITIS OF RIGHT BUTTOCK: ICD-10-CM

## 2023-05-09 DIAGNOSIS — M25.551 RIGHT HIP PAIN: Primary | ICD-10-CM

## 2023-05-09 DIAGNOSIS — M79.604 RIGHT LEG PAIN: ICD-10-CM

## 2023-05-09 PROCEDURE — 3008F BODY MASS INDEX DOCD: CPT | Performed by: PHYSICIAN ASSISTANT

## 2023-05-09 PROCEDURE — 99203 OFFICE O/P NEW LOW 30 MIN: CPT | Performed by: PHYSICIAN ASSISTANT

## 2023-05-09 RX ORDER — MELOXICAM 15 MG/1
15 TABLET ORAL
Qty: 30 TABLET | Refills: 0 | Status: SHIPPED | OUTPATIENT
Start: 2023-05-09

## 2023-07-10 ENCOUNTER — TELEPHONE (OUTPATIENT)
Dept: FAMILY MEDICINE CLINIC | Facility: CLINIC | Age: 43
End: 2023-07-10

## 2023-09-05 DIAGNOSIS — E11.69 TYPE 2 DIABETES MELLITUS WITH HYPERCHOLESTEROLEMIA: ICD-10-CM

## 2023-09-05 DIAGNOSIS — E78.00 TYPE 2 DIABETES MELLITUS WITH HYPERCHOLESTEROLEMIA: ICD-10-CM

## 2023-09-06 RX ORDER — METFORMIN HYDROCHLORIDE 750 MG/1
750 TABLET, EXTENDED RELEASE ORAL DAILY
Qty: 90 TABLET | Refills: 0 | Status: SHIPPED | OUTPATIENT
Start: 2023-09-06

## 2023-09-06 NOTE — TELEPHONE ENCOUNTER
Diabetic Medication Protocol Passed09/05/2023 11:53 PM   Protocol Details HgBA1C procedure resulted in past 6 months    Last HgBA1C < 7.5    Microalbumin procedure in past 12 months or taking ACE/ARB    Appointment in past 6 or next 3 months

## 2023-09-20 ENCOUNTER — TELEPHONE (OUTPATIENT)
Dept: FAMILY MEDICINE CLINIC | Facility: CLINIC | Age: 43
End: 2023-09-20

## 2023-09-20 DIAGNOSIS — M25.551 RIGHT HIP PAIN: ICD-10-CM

## 2023-09-20 DIAGNOSIS — M76.01 GLUTEAL TENDINITIS OF RIGHT BUTTOCK: ICD-10-CM

## 2023-09-20 DIAGNOSIS — M79.604 RIGHT LEG PAIN: ICD-10-CM

## 2023-09-20 DIAGNOSIS — E11.69 DIABETES MELLITUS TYPE 2 IN OBESE  (HCC): ICD-10-CM

## 2023-09-20 DIAGNOSIS — E66.9 DIABETES MELLITUS TYPE 2 IN OBESE  (HCC): ICD-10-CM

## 2023-09-20 DIAGNOSIS — E66.9 OBESITY (BMI 30-39.9): ICD-10-CM

## 2023-09-21 RX ORDER — MELOXICAM 15 MG/1
15 TABLET ORAL
Qty: 30 TABLET | Refills: 0 | Status: SHIPPED | OUTPATIENT
Start: 2023-09-21

## 2023-09-21 RX ORDER — PHENTERMINE HYDROCHLORIDE 15 MG/1
15 CAPSULE ORAL EVERY MORNING
Qty: 30 CAPSULE | Refills: 0 | Status: SHIPPED | OUTPATIENT
Start: 2023-09-21 | End: 2023-12-04 | Stop reason: ALTCHOICE

## 2023-09-21 RX ORDER — ATORVASTATIN CALCIUM 10 MG/1
10 TABLET, FILM COATED ORAL NIGHTLY
Qty: 90 TABLET | Refills: 0 | Status: SHIPPED | OUTPATIENT
Start: 2023-09-21 | End: 2023-12-20

## 2023-09-21 NOTE — TELEPHONE ENCOUNTER
Phentermine last filled in May - do you want patient to follow up prior to refilling? LOV: 23 for diabetes      atorvastatin 10 MG Oral Tab  Take 1 tablet (10 mg total) by mouth nightly. Dispense: 90 tablet, Refills: 0 ordered       2023            Phentermine HCl 15 MG Oral Cap () 30 capsule 0 2023   Sig:   Take 1 capsule (15 mg total) by mouth every morning. Cholesterol Medication Protocol [id number] 09:08 PM   Protocol Details ALT < 80    ALT resulted within past year    Lipid panel within past 12 months    Appointment within past 12 or next 3 months        No future appointments.

## 2023-09-21 NOTE — TELEPHONE ENCOUNTER
Meloxicam  Last OV: 05/09/23  Last refill date: 05/09/23     #/refills: 30/0  Upcoming appt: No future appointments.     04/27/23  BUN  7 - 18 mg/dL 25 High    Creatinine  0.55 - 1.02 mg/dL 0.75   Calcium, Total  8.5 - 10.1 mg/dL 8.8   Calculated Osmolality  275 - 295 mOsm/kg 292   eGFR-Cr  >=60 mL/min/1.73m2 102

## 2023-11-28 ENCOUNTER — OFFICE VISIT (OUTPATIENT)
Facility: CLINIC | Age: 43
End: 2023-11-28
Payer: MEDICAID

## 2023-11-28 VITALS — BODY MASS INDEX: 34.95 KG/M2 | HEIGHT: 60 IN | WEIGHT: 178 LBS

## 2023-11-28 DIAGNOSIS — M70.61 GREATER TROCHANTERIC BURSITIS OF RIGHT HIP: ICD-10-CM

## 2023-11-28 DIAGNOSIS — M25.551 RIGHT HIP PAIN: Primary | ICD-10-CM

## 2023-11-28 PROCEDURE — 3008F BODY MASS INDEX DOCD: CPT | Performed by: PHYSICIAN ASSISTANT

## 2023-11-28 PROCEDURE — 99213 OFFICE O/P EST LOW 20 MIN: CPT | Performed by: PHYSICIAN ASSISTANT

## 2023-11-28 RX ORDER — CELECOXIB 200 MG/1
200 CAPSULE ORAL
Qty: 30 CAPSULE | Refills: 0 | Status: SHIPPED | OUTPATIENT
Start: 2023-11-28

## 2023-11-28 NOTE — PROGRESS NOTES
[facility] Progress Note    Subjective: Patient returns to clinic after last being seen by me on 5/9/2023. At that visit, she was diagnosed with gluteal tendinitis of the right hip, referred to physical therapy and given a prescription of meloxicam.  She reports that the meloxicam has been helpful. She has not gone to any physical therapy. She reports pain relief was felt until about 2 weeks ago when she went to a massage session and felt an exacerbation of right lateral hip pain. She also feels recurrence of her pain spreading along the lateral right lower leg. She has tried meloxicam without any significant relief at this point. Objective: Patient seated comfortably in the exam chair. Alert and oriented x 3. Nonlabored breathing. Gross neurologically intact. Tenderness to palpation directly over the greater trochanteric bursa of the right hip. Pain with FADIR position but no significant pain with SHI position. Internal rotation of 45 degrees and external rotation of the right hip to 45 degrees. Assessment/Plan: Discussed with the patient that her symptoms at this time appear most consistent with greater trochanteric bursitis of the right hip. I discussed the natural history and pathophysiology of greater trochanteric bursitis with the patient in detail. I discussed that oftentimes the underlying cause for bursitis is tightness of the IT band, and performing targeted exercises through physical therapy or physician directed exercises to condition the IT band will ultimately resolve this issue. We discussed treatment options to reduce the inflammation, including oral NSAIDs if not medically contraindicated versus greater trochanteric bursal injection. We discussed the desire to avoid repetitive steroid injections into the greater trochanteric bursa due to risk of chronic tendinopathy.   After discussion of the treatment options, the patient endorsed interest in a prescription of Celebrex which was sent to her pharmacy. I encouraged her to stop taking meloxicam and avoid other NSAIDs while taking Celebrex. I encouraged her to take this every day for 3 weeks, after which point I will MyChart message the patient for an update on her progress and if improvement is felt, she can go to as needed use of the Celebrex. If no improvement, will consider steroid injection into the greater trochanteric bursa versus MRI. Her questions were sought and answered satisfactorily. She is happy with the plan and will follow as advised. Claude Wynne PA-C  [facility]    This note was dictated using Dragon software. While it was briefly proofread prior to completion, some grammatical, spelling, and word choice errors due to dictation may still occur.

## 2023-11-29 ENCOUNTER — TELEPHONE (OUTPATIENT)
Dept: FAMILY MEDICINE CLINIC | Facility: CLINIC | Age: 43
End: 2023-11-29

## 2023-11-29 ENCOUNTER — LAB ENCOUNTER (OUTPATIENT)
Dept: LAB | Age: 43
End: 2023-11-29
Attending: NURSE PRACTITIONER
Payer: MEDICAID

## 2023-11-29 DIAGNOSIS — R79.89 ELEVATED TSH: ICD-10-CM

## 2023-11-29 DIAGNOSIS — E78.5 HYPERLIPIDEMIA, UNSPECIFIED HYPERLIPIDEMIA TYPE: ICD-10-CM

## 2023-11-29 DIAGNOSIS — E11.69 TYPE 2 DIABETES MELLITUS WITH HYPERCHOLESTEROLEMIA: ICD-10-CM

## 2023-11-29 DIAGNOSIS — E78.00 TYPE 2 DIABETES MELLITUS WITH HYPERCHOLESTEROLEMIA: ICD-10-CM

## 2023-11-29 DIAGNOSIS — E03.9 HYPOTHYROIDISM, UNSPECIFIED TYPE: Primary | ICD-10-CM

## 2023-11-29 LAB
CHOLEST SERPL-MCNC: 153 MG/DL (ref ?–200)
EST. AVERAGE GLUCOSE BLD GHB EST-MCNC: 160 MG/DL (ref 68–126)
FASTING PATIENT LIPID ANSWER: YES
HBA1C MFR BLD: 7.2 % (ref ?–5.7)
HDLC SERPL-MCNC: 46 MG/DL (ref 40–59)
LDLC SERPL CALC-MCNC: 66 MG/DL (ref ?–100)
NONHDLC SERPL-MCNC: 107 MG/DL (ref ?–130)
TRIGL SERPL-MCNC: 256 MG/DL (ref 30–149)
TSI SER-ACNC: 7.33 MIU/ML (ref 0.36–3.74)
VLDLC SERPL CALC-MCNC: 39 MG/DL (ref 0–30)

## 2023-11-29 PROCEDURE — 84443 ASSAY THYROID STIM HORMONE: CPT

## 2023-11-29 PROCEDURE — 36415 COLL VENOUS BLD VENIPUNCTURE: CPT

## 2023-11-29 PROCEDURE — 83036 HEMOGLOBIN GLYCOSYLATED A1C: CPT

## 2023-11-29 PROCEDURE — 80061 LIPID PANEL: CPT

## 2023-11-29 RX ORDER — LEVOTHYROXINE SODIUM 0.03 MG/1
25 TABLET ORAL
Qty: 90 TABLET | Refills: 0 | Status: SHIPPED | OUTPATIENT
Start: 2023-11-29

## 2023-11-29 NOTE — TELEPHONE ENCOUNTER
----- Message from IRMA Renee sent at 11/29/2023 11:36 AM CST -----  Cholesterol improved, triglycerides still elevated but overall better. Continue current dose of Atorvastatin. Also recommend OTC fish oil. Recheck in 3 months  TSH increased from previous. Needs to start Levothyroxine 25 mcg daily for hypothyroidism.  Recheck TSH in 6-8 weeks

## 2023-11-29 NOTE — TELEPHONE ENCOUNTER
Pt notified and verbalized understanding. Repeat lipid order placed for 3mos. Rx sent and repeat TSH ordered for 6 weeks.

## 2023-11-29 NOTE — TELEPHONE ENCOUNTER
----- Message from IRMA Huggins sent at 11/29/2023  2:17 PM CST -----  A1C increased from previous.  Please have patient schedule OV or VV to discuss other options

## 2023-12-01 NOTE — TELEPHONE ENCOUNTER
Patient advised. Verbalized understanding.    Future Appointments   Date Time Provider Tarsha Sutherland   12/4/2023 11:20 AM IRMA Camacho [facility]   2/5/2024 12:30 PM Raúl Sofia DO [facility]

## 2023-12-04 ENCOUNTER — TELEMEDICINE (OUTPATIENT)
Dept: FAMILY MEDICINE CLINIC | Facility: CLINIC | Age: 43
End: 2023-12-04
Payer: MEDICAID

## 2023-12-04 DIAGNOSIS — E66.9 OBESITY (BMI 30-39.9): ICD-10-CM

## 2023-12-04 DIAGNOSIS — Z86.32 HISTORY OF GESTATIONAL DIABETES: ICD-10-CM

## 2023-12-04 DIAGNOSIS — E03.9 ACQUIRED HYPOTHYROIDISM: ICD-10-CM

## 2023-12-04 DIAGNOSIS — E66.9 DIABETES MELLITUS TYPE 2 IN OBESE: Primary | ICD-10-CM

## 2023-12-04 DIAGNOSIS — E78.2 ELEVATED CHOLESTEROL WITH ELEVATED TRIGLYCERIDES: ICD-10-CM

## 2023-12-04 DIAGNOSIS — E11.69 DIABETES MELLITUS TYPE 2 IN OBESE: Primary | ICD-10-CM

## 2023-12-04 PROCEDURE — 99214 OFFICE O/P EST MOD 30 MIN: CPT | Performed by: NURSE PRACTITIONER

## 2023-12-04 RX ORDER — DULAGLUTIDE 0.75 MG/.5ML
0.75 INJECTION, SOLUTION SUBCUTANEOUS WEEKLY
Qty: 2 ML | Refills: 0 | Status: SHIPPED | OUTPATIENT
Start: 2023-12-04 | End: 2024-01-03

## 2023-12-04 NOTE — PROGRESS NOTES
Virtual/Telephone Check-In    Hannah Painting  verbally consents to a Virtual/Telephone Check-In service on 12/4/2023 . Patient understands and accepts financial responsibility for any deductible, co-insurance and/or co-pays associated with this service. This was a video visit with audio and visual connection via Internet connection call with the patient    Duration of the service: 20 minutes    Problem List Items Addressed This Visit          Endocrine and Metabolic    Diabetes mellitus type 2 in obese     Acquired hypothyroidism     Other Visit Diagnoses       History of gestational diabetes    -  Primary           Chief Complaint   Patient presents with    Lab Results    Follow - Up       Medications allergies and chart reviewed  COVID-19 protocol    HPI:   Hannah Painting is a 37year old female who schedules a virtual visit to discuss recent lab work. Has history of GDM. Has been to diabetic educator in past. Currently on Metformin. Has struggled with weight for long time. Typically is really regimented January to May. More active in the summer. Loves carb foods, + sweet cravings  Minimal alcohol intake. Doesn't eat fast food. Has gone to [facility] in [facility] (last appt 9/2022). Has previously taken Phentermine and has had weight loss but usually regains weight. Last A1c value was 7.2% done 11/29/2023. Cholesterol: 153, done on 11/29/2023. HDL Cholesterol: 46, done on 11/29/2023. LDL Cholesterol: 66, done on 11/29/2023. TriGlycerides 256, done on 11/29/2023. Component  Ref Range & Units 11/29/23  8:54 AM   TSH  0.358 - 3.740 mIU/mL 7.330 High           Allergies:  No Known Allergies    Current Outpatient Medications   Medication Sig Dispense Refill    levothyroxine 25 MCG Oral Tab Take 1 tablet (25 mcg total) by mouth before breakfast. 90 tablet 0    celecoxib (CELEBREX) 200 MG Oral Cap Take 1 capsule (200 mg total) by mouth daily with food.  30 capsule 0 ATORVASTATIN 10 MG Oral Tab Take 1 tablet (10 mg total) by mouth nightly. 90 tablet 0    Meloxicam 15 MG Oral Tab Take 1 tablet (15 mg total) by mouth daily with breakfast. 30 tablet 0    METFORMIN  MG Oral Tablet 24 Hr TAKE ONE TABLET BY MOUTH ONE TIME DAILY 90 tablet 0      Past Medical History:   Diagnosis Date    Diabetes ([facility] [address]     Diabetes mellitus ([facility] [address]       Past Surgical History:   Procedure Laterality Date      13    TUBAL LIGATION        Family History   Problem Relation Age of Onset    Heart Disorder Father     Diabetes Mother     Cancer Mother 79        lymphoma    Diabetes Maternal Grandmother     Heart Disorder Maternal Grandmother     Diabetes Maternal Grandfather     Heart Disorder Maternal Grandfather     Diabetes Paternal Grandmother     Heart Disorder Paternal Grandmother     Diabetes Paternal Grandfather     Heart Disorder Paternal Grandfather     No Known Problems Daughter     No Known Problems Son       Social History     Socioeconomic History    Marital status:    Tobacco Use    Smoking status: Never    Smokeless tobacco: Never   Vaping Use    Vaping Use: Never used   Substance and Sexual Activity    Alcohol use: Not Currently     Comment: Rarely    Drug use: No   Other Topics Concern    Caffeine Concern No     Comment: coffee ocassionally    Exercise No    Seat Belt Yes   Social History Narrative    ** Merged History Encounter **              EXAM:   VITALS: not available as this is a telemed visit    GENERAL: Does not appear to be in acute distress  LUNG: No dyspnea with conversation  rest of physical exam unable to perform as this is a telemed visit    ASSESSMENT AND PLAN:     Encounter Diagnoses   Name Primary?     Diabetes mellitus type 2 in obese      History of gestational diabetes Yes    Acquired hypothyroidism      Problem List Items Addressed This Visit          Endocrine and Metabolic    Diabetes mellitus type 2 in obese     Acquired hypothyroidism Other Visit Diagnoses       History of gestational diabetes    -  Primary          Reviewed recent labs and recommendations. Agreeable to trying GLP 1. No personal or family history of thyroid cancer. Recheck TSH in January, other labs late February  Follow up in office in early March to discuss  The patient indicates understanding of these issues and agrees to the plan. The patient is asked to return if sx's persist or worsen. Patient had an opportunity to ask questions and they were answered to her satisfaction. \"Please note that this visit was completed using two-way, real-time interactive audio and/or video communication. This has been done in good kenna to provide continuity of care in the best interest of the provider-patient relationship, due to the ongoing public health crisis/national emergency and because of restrictions of visitation. There are limitations of this visit as no physical exam could be performed. Every conscious effort was taken to allow for sufficient and adequate time. This billing was spent on reviewing labs, medications, radiology tests and decision making. Appropriate medical decision-making and tests are ordered as detailed in the plan of care above. \"      Essence Harrell APRN

## 2023-12-19 ENCOUNTER — PATIENT MESSAGE (OUTPATIENT)
Facility: CLINIC | Age: 43
End: 2023-12-19

## 2023-12-20 DIAGNOSIS — E11.69 DIABETES MELLITUS TYPE 2 IN OBESE  (HCC): ICD-10-CM

## 2023-12-20 DIAGNOSIS — E11.69 TYPE 2 DIABETES MELLITUS WITH HYPERCHOLESTEROLEMIA  (HCC): ICD-10-CM

## 2023-12-20 DIAGNOSIS — E78.00 TYPE 2 DIABETES MELLITUS WITH HYPERCHOLESTEROLEMIA  (HCC): ICD-10-CM

## 2023-12-20 DIAGNOSIS — E66.9 DIABETES MELLITUS TYPE 2 IN OBESE  (HCC): ICD-10-CM

## 2023-12-20 RX ORDER — METFORMIN HYDROCHLORIDE 750 MG/1
750 TABLET, EXTENDED RELEASE ORAL DAILY
Qty: 90 TABLET | Refills: 0 | Status: SHIPPED | OUTPATIENT
Start: 2023-12-20

## 2023-12-20 RX ORDER — ATORVASTATIN CALCIUM 10 MG/1
10 TABLET, FILM COATED ORAL NIGHTLY
Qty: 90 TABLET | Refills: 0 | Status: SHIPPED | OUTPATIENT
Start: 2023-12-20

## 2023-12-20 NOTE — TELEPHONE ENCOUNTER
LOV 12/4/23 VV for DM. Diabetic Medication Protocol [id number]/20/2023 09:27 AM   Protocol Details Appointment in past 6 or next 3 months    HgBA1C procedure resulted in past 6 months    Last HgBA1C < 7.5    Microalbumin procedure in past 12 months or taking ACE/ARB     holesterol Medication Protocol [id number]/20/2023 09:27 AM   Protocol Details ALT < 80    ALT resulted within past year    Lipid panel within past 12 months    Appointment within past 12 or next 3 months     Both filled.   Future Appointments   Date Time Provider Tarsha Sutherland   2/5/2024 12:30 PM Andrea Sofia DO [facility]

## 2023-12-21 ENCOUNTER — TELEPHONE (OUTPATIENT)
Facility: CLINIC | Age: 43
End: 2023-12-21

## 2024-01-02 ENCOUNTER — PATIENT MESSAGE (OUTPATIENT)
Dept: FAMILY MEDICINE CLINIC | Facility: CLINIC | Age: 44
End: 2024-01-02

## 2024-01-02 DIAGNOSIS — E66.9 DIABETES MELLITUS TYPE 2 IN OBESE  (HCC): ICD-10-CM

## 2024-01-02 DIAGNOSIS — E11.69 DIABETES MELLITUS TYPE 2 IN OBESE  (HCC): ICD-10-CM

## 2024-01-02 RX ORDER — DULAGLUTIDE 0.75 MG/.5ML
0.75 INJECTION, SOLUTION SUBCUTANEOUS WEEKLY
Qty: 2 ML | Refills: 0 | Status: SHIPPED | OUTPATIENT
Start: 2024-01-02 | End: 2024-02-01

## 2024-01-02 NOTE — TELEPHONE ENCOUNTER
From: Zenobia Mccoy  To: Maureen Leyva  Sent: 1/2/2024 10:46 AM CST  Subject: Refill medication question     Hello there I'm following up on my first dose of simplicity treatment. I just finished last Saturday last shot and I don't have anymore. My question is what is next for me ?do I need another refill? If you please check with my doctor thank you.   Zenobia

## 2024-01-02 NOTE — TELEPHONE ENCOUNTER
iabetic Medication Protocol Peymmy2301/02/2024 10:46 AM   Protocol Details Appointment in past 6 or next 3 months    HgBA1C procedure resulted in past 6 months    Last HgBA1C < 7.5    Microalbumin procedure in past 12 months or taking ACE/ARB       Last refill 12/4/23 2mL 0 refill  Last OV televisit 12/4/23  Future Appointments   Date Time Provider Department Center   1/10/2024  8:45 AM [facility] [place]   2/20/2024 11:30 AM Ana Sofia DO [facility]      Reviewed recent labs and recommendations.  Agreeable to trying GLP 1. No personal or family history of thyroid cancer.  Recheck TSH in January, other labs late February  Follow up in office in early March to discuss  The patient indicates understanding of these issues and agrees to the plan.  The patient is asked to return if sx's persist or worsen.    Refill sent

## 2024-01-08 ENCOUNTER — TELEMEDICINE (OUTPATIENT)
Dept: FAMILY MEDICINE CLINIC | Facility: CLINIC | Age: 44
End: 2024-01-08
Payer: MEDICAID

## 2024-01-08 DIAGNOSIS — E66.9 DIABETES MELLITUS TYPE 2 IN OBESE  (HCC): Primary | ICD-10-CM

## 2024-01-08 DIAGNOSIS — E78.00 TYPE 2 DIABETES MELLITUS WITH HYPERCHOLESTEROLEMIA  (HCC): ICD-10-CM

## 2024-01-08 DIAGNOSIS — E66.9 OBESITY (BMI 30-39.9): ICD-10-CM

## 2024-01-08 DIAGNOSIS — E11.69 TYPE 2 DIABETES MELLITUS WITH HYPERCHOLESTEROLEMIA  (HCC): ICD-10-CM

## 2024-01-08 DIAGNOSIS — R20.0 NUMBNESS AND TINGLING IN BOTH HANDS: ICD-10-CM

## 2024-01-08 DIAGNOSIS — R20.2 NUMBNESS AND TINGLING IN BOTH HANDS: ICD-10-CM

## 2024-01-08 DIAGNOSIS — E11.69 DIABETES MELLITUS TYPE 2 IN OBESE  (HCC): Primary | ICD-10-CM

## 2024-01-08 DIAGNOSIS — E03.9 ACQUIRED HYPOTHYROIDISM: ICD-10-CM

## 2024-01-08 RX ORDER — PHENTERMINE HYDROCHLORIDE 30 MG/1
30 CAPSULE ORAL EVERY MORNING
Qty: 30 CAPSULE | Refills: 0 | Status: SHIPPED | OUTPATIENT
Start: 2024-01-08 | End: 2024-02-07

## 2024-01-08 NOTE — PROGRESS NOTES
Virtual/Telephone Check-In    Zenobia Mccoy  verbally consents to a Virtual/Telephone Check-In service on 1/8/2024 .  Patient understands and accepts financial responsibility for any deductible, co-insurance and/or co-pays associated with this service.    This was a video visit with audio and visual connection via Internet connection call with the patient    Problem List Items Addressed This Visit          HCC Problems    Diabetes mellitus type 2 in obese  (HCC) - Primary       Endocrine and Metabolic    Obesity (BMI 30-39.9)    Relevant Medications    Phentermine HCl 30 MG Oral Cap     Other Visit Diagnoses       Type 2 diabetes mellitus with hypercholesterolemia  (HCC)        Numbness and tingling in both hands               Chief Complaint   Patient presents with    Medication Follow-Up       Medications allergies and chart reviewed  COVID-19 protocol    HPI:   Zenobia Mccoy is a 43 year old female who schedules a virtual visit for medication follow up. Started Trulicity next month. Did not see much difference in appetite or weight. Had HA once after 3rd dose but otherwise no noticed side effects. Would like to try adding Phentermine.     Also noticed some tingling in her hands first thing in the morning. Hands are numb as well. Lasts about 30 minutes in am but then once she is up and moving, gets better. Occasionally gets numbness briefly throughout the day.     Needs to see derm. Cheeks are red most days. Also has white bumps under eye. Would like referral.     May be losing her Medicaid insurance. Just reapplied and doesn't think she will be covered soon.      Allergies:  No Known Allergies    Current Outpatient Medications   Medication Sig Dispense Refill    Phentermine HCl 30 MG Oral Cap Take 1 capsule (30 mg total) by mouth every morning. 30 capsule 0    Dulaglutide (TRULICITY) 0.75 MG/0.5ML Subcutaneous Solution Pen-injector Inject 0.75 mg into the skin once a week. 2 mL 0    METFORMIN   MG Oral Tablet 24 Hr TAKE ONE TABLET BY MOUTH ONE TIME DAILY 90 tablet 0    ATORVASTATIN 10 MG Oral Tab Take 1 tablet by mouth nightly. 90 tablet 0    levothyroxine 25 MCG Oral Tab Take 1 tablet (25 mcg total) by mouth before breakfast. 90 tablet 0    celecoxib (CELEBREX) 200 MG Oral Cap Take 1 capsule (200 mg total) by mouth daily with food. 30 capsule 0    Meloxicam 15 MG Oral Tab Take 1 tablet (15 mg total) by mouth daily with breakfast. 30 tablet 0      Past Medical History:   Diagnosis Date    Diabetes (HCC)     Diabetes mellitus (HCC)       Past Surgical History:   Procedure Laterality Date      13    TUBAL LIGATION        Family History   Problem Relation Age of Onset    Heart Disorder Father     Other (Hypothyroidism) Father     Diabetes Mother     Cancer Mother 67        lymphoma    No Known Problems Daughter     No Known Problems Son     Diabetes Maternal Grandmother     Heart Disorder Maternal Grandmother     Diabetes Maternal Grandfather     Heart Disorder Maternal Grandfather     Diabetes Paternal Grandmother     Heart Disorder Paternal Grandmother     Diabetes Paternal Grandfather     Heart Disorder Paternal Grandfather       Social History     Socioeconomic History    Marital status:    Tobacco Use    Smoking status: Never    Smokeless tobacco: Never   Vaping Use    Vaping Use: Never used   Substance and Sexual Activity    Alcohol use: Not Currently     Comment: Rarely    Drug use: No   Other Topics Concern    Caffeine Concern No     Comment: coffee ocassionally    Exercise No    Seat Belt Yes   Social History Narrative    ** Merged History Encounter **              REVIEW OF SYSTEMS:   GENERAL: Denies unusual weight gain or loss  SKIN: no rashes  LUNGS: denies cough, SOB  CARDIOVASCULAR: denies chest pain, palpitations  GI: denies abdominal pain, nausea, vomiting, change in bowel movements  NEURO: denies headaches, dizziness, LOC  MUSC: tingling/numbness intermittently, both  hands    EXAM:   VITALS: not available as this is a telemed visit    GENERAL: Does not appear to be in acute distress  LUNG: No dyspnea with conversation  rest of physical exam unable to perform as this is a telemed visit    ASSESSMENT AND PLAN:     Encounter Diagnoses   Name Primary?    Diabetes mellitus type 2 in obese  (HCC) Yes    Type 2 diabetes mellitus with hypercholesterolemia  (HCC)     Obesity (BMI 30-39.9)     Numbness and tingling in both hands      Problem List Items Addressed This Visit          HCC Problems    Diabetes mellitus type 2 in obese  (HCC) - Primary       Endocrine and Metabolic    Obesity (BMI 30-39.9)    Relevant Medications    Phentermine HCl 30 MG Oral Cap     Other Visit Diagnoses       Type 2 diabetes mellitus with hypercholesterolemia  (HCC)        Numbness and tingling in both hands              Recheck TSH this week, other labs in March  Patient already picked up Trulicity, so will not increase dose. If insurance continues, plan to increase next month  Trial adding Phentermine  Numbness sounds like possible carpal tunnel. Trial wearing wrist braces while sleeping. RTC if persisting or worsening  The patient indicates understanding of these issues and agrees to the plan.    Patient had an opportunity to ask questions and they were answered to her satisfaction.    \"Please note that this visit was completed using two-way, real-time interactive audio and/or video communication.  This has been done in good kenna to provide continuity of care in the best interest of the provider-patient relationship, due to the ongoing public health crisis/national emergency and because of restrictions of visitation.  There are limitations of this visit as no physical exam could be performed.  Every conscious effort was taken to allow for sufficient and adequate time.  This billing was spent on reviewing labs, medications, radiology tests and decision making.  Appropriate medical decision-making and  tests are ordered as detailed in the plan of care above.\"      Maureen Leyva APRN

## 2024-01-10 ENCOUNTER — TELEPHONE (OUTPATIENT)
Dept: FAMILY MEDICINE CLINIC | Facility: CLINIC | Age: 44
End: 2024-01-10

## 2024-01-10 ENCOUNTER — LAB ENCOUNTER (OUTPATIENT)
Dept: LAB | Age: 44
End: 2024-01-10
Attending: OBSTETRICS & GYNECOLOGY
Payer: MEDICAID

## 2024-01-10 DIAGNOSIS — E78.2 ELEVATED CHOLESTEROL WITH ELEVATED TRIGLYCERIDES: ICD-10-CM

## 2024-01-10 DIAGNOSIS — E11.69 DIABETES MELLITUS TYPE 2 IN OBESE  (HCC): ICD-10-CM

## 2024-01-10 DIAGNOSIS — E03.9 ACQUIRED HYPOTHYROIDISM: ICD-10-CM

## 2024-01-10 DIAGNOSIS — E03.9 HYPOTHYROIDISM, UNSPECIFIED TYPE: ICD-10-CM

## 2024-01-10 DIAGNOSIS — E11.69 DIABETES MELLITUS TYPE 2 IN OBESE  (HCC): Primary | ICD-10-CM

## 2024-01-10 DIAGNOSIS — E66.9 DIABETES MELLITUS TYPE 2 IN OBESE  (HCC): ICD-10-CM

## 2024-01-10 DIAGNOSIS — E66.9 DIABETES MELLITUS TYPE 2 IN OBESE  (HCC): Primary | ICD-10-CM

## 2024-01-10 LAB
EST. AVERAGE GLUCOSE BLD GHB EST-MCNC: 163 MG/DL (ref 68–126)
HBA1C MFR BLD: 7.3 % (ref ?–5.7)
TSI SER-ACNC: 4.05 MIU/ML (ref 0.36–3.74)

## 2024-01-10 PROCEDURE — 84443 ASSAY THYROID STIM HORMONE: CPT

## 2024-01-10 PROCEDURE — 83036 HEMOGLOBIN GLYCOSYLATED A1C: CPT

## 2024-01-10 PROCEDURE — 36415 COLL VENOUS BLD VENIPUNCTURE: CPT

## 2024-01-10 PROCEDURE — 3051F HG A1C>EQUAL 7.0%<8.0%: CPT | Performed by: NURSE PRACTITIONER

## 2024-01-10 RX ORDER — LEVOTHYROXINE SODIUM 0.05 MG/1
50 TABLET ORAL
Qty: 90 TABLET | Refills: 0 | Status: SHIPPED | OUTPATIENT
Start: 2024-01-10

## 2024-01-10 NOTE — TELEPHONE ENCOUNTER
----- Message from IRMA Smith sent at 1/10/2024  2:04 PM CST -----  A1C stable. This was checked prematurely. We had not planned on checking it until later in Feb or March due to starting Trulicity. Repeat in 3 months  TSH improved but still elevated. Increase Levothyroxine to 50 mcg daily. Recheck TSH in 8 weeks

## 2024-01-10 NOTE — TELEPHONE ENCOUNTER
Pt notified.  Per Zita, have pt do her A1C, TSH, Lipid, and CMP mid-March.  Pt verbalized understanding.

## 2024-01-24 ENCOUNTER — PATIENT MESSAGE (OUTPATIENT)
Facility: CLINIC | Age: 44
End: 2024-01-24

## 2024-01-24 DIAGNOSIS — M76.01 GLUTEAL TENDINITIS OF RIGHT BUTTOCK: ICD-10-CM

## 2024-01-24 DIAGNOSIS — M70.61 GREATER TROCHANTERIC BURSITIS OF RIGHT HIP: Primary | ICD-10-CM

## 2024-01-24 NOTE — TELEPHONE ENCOUNTER
Please see patient message and advise,  Last visit was 11/28/23  Patient notes pain has returned last visit noted if no improvement  troch injection can be done   Patient also received Celebrex rx for 200 mg 1 tablet daily QTY: 30 with 0 refills on 11/28/23      Would you want to add her on for injection or start with PT as patient is mentioning in message

## 2024-01-24 NOTE — TELEPHONE ENCOUNTER
Please see patients response and place order for Danny PT so she can go to the facility in [facility]

## 2024-01-24 NOTE — TELEPHONE ENCOUNTER
From: Zenobia Mccoy  To: Fabio Arshad  Sent: 1/24/2024 8:49 AM CST  Subject: Pain returned     Hi Doctor :) unfortunately after I finished medication you prescribed me,pain returned... not sure if I just need refill or physical therapy or shot. Please let me know what you think and if you want me to schedule appointment with you.  Thank you Zenobia

## 2024-01-29 ENCOUNTER — OFFICE VISIT (OUTPATIENT)
Dept: FAMILY MEDICINE CLINIC | Facility: CLINIC | Age: 44
End: 2024-01-29
Payer: MEDICAID

## 2024-01-29 VITALS
WEIGHT: 178 LBS | HEART RATE: 92 BPM | OXYGEN SATURATION: 98 % | TEMPERATURE: 99 F | HEIGHT: 60 IN | BODY MASS INDEX: 34.95 KG/M2 | DIASTOLIC BLOOD PRESSURE: 76 MMHG | RESPIRATION RATE: 18 BRPM | SYSTOLIC BLOOD PRESSURE: 114 MMHG

## 2024-01-29 DIAGNOSIS — J06.9 VIRAL URI WITH COUGH: Primary | ICD-10-CM

## 2024-01-29 LAB
OPERATOR ID: NORMAL
POCT LOT NUMBER: NORMAL
RAPID SARS-COV-2 BY PCR: NOT DETECTED

## 2024-01-29 PROCEDURE — U0002 COVID-19 LAB TEST NON-CDC: HCPCS | Performed by: NURSE PRACTITIONER

## 2024-01-29 PROCEDURE — 3078F DIAST BP <80 MM HG: CPT | Performed by: NURSE PRACTITIONER

## 2024-01-29 PROCEDURE — 3008F BODY MASS INDEX DOCD: CPT | Performed by: NURSE PRACTITIONER

## 2024-01-29 PROCEDURE — 3074F SYST BP LT 130 MM HG: CPT | Performed by: NURSE PRACTITIONER

## 2024-01-29 PROCEDURE — 99213 OFFICE O/P EST LOW 20 MIN: CPT | Performed by: NURSE PRACTITIONER

## 2024-01-29 RX ORDER — ALBUTEROL SULFATE 90 UG/1
2 AEROSOL, METERED RESPIRATORY (INHALATION) EVERY 4 HOURS PRN
Qty: 1 EACH | Refills: 0 | Status: SHIPPED | OUTPATIENT
Start: 2024-01-29

## 2024-01-29 NOTE — PATIENT INSTRUCTIONS
1. Rest. Drink plenty of fluids.   2. Supportive care as discussed. Albuterol inhaler as prescribed.   3. Covid-19 test negative.  4. Follow up with PMD in 4-5 days for re-eval. Go to the emergency department immediately if symptoms worsen, change, you develop chest discomfort, wheezing, shortness of breath, or if you have any concerns.

## 2024-01-29 NOTE — PROGRESS NOTES
CHIEF COMPLAINT:     Chief Complaint   Patient presents with    Cough     Entered by patient  Started Saturday morning   Exposure to covid   No fevers          HPI:   Zenobia Mccoy is a 43 year old female who presents for nasal congestion and cough. Patient reports symptoms started 2-3 days.  Denies any fevers, wheezing, chest discomfort, or shortness of breath. .  Treating symptoms with otc cold meds.   Tolerates PO well at home. No n/v/d.  Denies any other aggravating or relieving factors at home. Denies any other treatment attempts prior to arrival.  Notes covid-19 exposure from  who had covid-19 2-3 weeks ago.    Current Outpatient Medications   Medication Sig Dispense Refill    albuterol 108 (90 Base) MCG/ACT Inhalation Aero Soln Inhale 2 puffs into the lungs every 4 (four) hours as needed (coughing spells). 1 each 0    levothyroxine 50 MCG Oral Tab Take 1 tablet (50 mcg total) by mouth before breakfast. 90 tablet 0    Phentermine HCl 30 MG Oral Cap Take 1 capsule (30 mg total) by mouth every morning. 30 capsule 0    Dulaglutide (TRULICITY) 0.75 MG/0.5ML Subcutaneous Solution Pen-injector Inject 0.75 mg into the skin once a week. 2 mL 0    METFORMIN  MG Oral Tablet 24 Hr TAKE ONE TABLET BY MOUTH ONE TIME DAILY 90 tablet 0    ATORVASTATIN 10 MG Oral Tab Take 1 tablet by mouth nightly. 90 tablet 0    celecoxib (CELEBREX) 200 MG Oral Cap Take 1 capsule (200 mg total) by mouth daily with food. 30 capsule 0    Meloxicam 15 MG Oral Tab Take 1 tablet (15 mg total) by mouth daily with breakfast. 30 tablet 0      Past Medical History:   Diagnosis Date    Diabetes (HCC)     Diabetes mellitus (HCC)       Past Surgical History:   Procedure Laterality Date      13    TUBAL LIGATION           Social History     Socioeconomic History    Marital status:    Tobacco Use    Smoking status: Never    Smokeless tobacco: Never   Vaping Use    Vaping Use: Never used   Substance and Sexual  Activity    Alcohol use: Not Currently     Comment: Rarely    Drug use: No   Other Topics Concern    Caffeine Concern No     Comment: coffee ocassionally    Exercise No    Seat Belt Yes   Social History Narrative    ** Merged History Encounter **              REVIEW OF SYSTEMS:   GENERAL: Denies fever. Notes good appetite  SKIN: no rashes or abnormal skin lesions  HEENT: Denies sore throat, + sinus symptoms, Denies ear pain  LUNGS: + nonproductive cough, denies shortness of breath or wheezing,   CARDIOVASCULAR: denies chest pain or palpitations   GI: denies N/V/C or abdominal pain  NEURO: Denies headaches    EXAM:   /76   Pulse 92   Temp 98.5 °F (36.9 °C)   Resp 18   Ht 5' (1.524 m)   Wt 178 lb (80.7 kg)   LMP 01/08/2024 (Approximate)   SpO2 98%   BMI 34.76 kg/m²   GENERAL: well developed, well nourished,in no apparent distress  SKIN: no rashes,no suspicious lesions  HEAD: atraumatic, normocephalic.    EYES: conjunctiva clear  EARS: TM's intact and without erythema, no bulging, no retraction,no fluid, bony landmarks visualized. No erythema or swelling noted to ear canals or external ears.   NOSE: Nostrils patent, clear nasal discharge, nasal mucosa reddened   THROAT: Oral mucosa pink, moist. Posterior pharynx is not erythematous. No exudates. No tonsillar hypertrophy noted.  No trismus. Uvula midline with no swelling. Voice clear/normal. No stridor  NECK: Supple, non-tender  LUNGS: clear to auscultation bilaterally, no rales, wheezes or rhonchi. Breathing is non labored.  CARDIO: RRR without murmur  EXTREMITIES: no cyanosis, clubbing or edema  LYMPH:  No lymphadenopathy.        ASSESSMENT AND PLAN:       ICD-10-CM    1. Viral URI with cough  J06.9 Rapid Covid-19     albuterol 108 (90 Base) MCG/ACT Inhalation Aero Soln          Covid-19 test negative.    Discussed physical exam and hpi with pt. No bacterial focus noted on exam. Pt has reassuring physical exam consistent with mild viral uri symptoms.  Lungs clear bilat. No respiratory distress noted. Treatment options discussed with patient and explained in detail. We reviewed symptomatic care at home and will start prn albuterol for coughing spells. The risks, benefits and potential side effects of possible medications were reviewed. Alternatives were discussed. Monitoring parameters and expected course outlined. Patient to call PCP or go to emergency department if symptoms fail to respond as outlined, or worsen in any way. The patient agreed with the plan.

## 2024-02-01 ENCOUNTER — OFFICE VISIT (OUTPATIENT)
Dept: PHYSICAL THERAPY | Age: 44
End: 2024-02-01
Attending: PHYSICIAN ASSISTANT
Payer: MEDICAID

## 2024-02-01 DIAGNOSIS — M76.01 GLUTEAL TENDINITIS OF RIGHT BUTTOCK: ICD-10-CM

## 2024-02-01 DIAGNOSIS — M70.61 GREATER TROCHANTERIC BURSITIS OF RIGHT HIP: Primary | ICD-10-CM

## 2024-02-01 PROCEDURE — 97110 THERAPEUTIC EXERCISES: CPT

## 2024-02-01 PROCEDURE — 97161 PT EVAL LOW COMPLEX 20 MIN: CPT

## 2024-02-01 NOTE — PROGRESS NOTES
SPINE EVALUATION:     Diagnosis:   Greater trochanteric bursitis of right hip (M70.61)  Gluteal tendinitis of right buttock (M76.01)      Referring Provider: Jeancarlos  Date of Evaluation:    2/1/2024    Precautions:  None Next MD visit:   none scheduled  Date of Surgery: n/a     PATIENT SUMMARY   Zenobia Mccoy is a 43 year old female who presents to therapy today with complaints of right gluteal pain  Pt describes pain level at worst 9/10.     Current functional limitations include walking, sleeping and housework (get up from bed, rising from couch) Sleeps on stomach or left side (would like to sleep on R side).     Variable    Describes obstruction    Zenobia describes prior level of function present for 2 years, but returned in November after a massage.     Pt goals include no pain.    Past medical history was reviewed with Zenobia. Significant findings include   Past Medical History:   Diagnosis Date    Diabetes (HCC)     Diabetes mellitus (HCC)        Pt denies diplopia, dysarthria, dysphasia, dizziness, drop attacks, bowel/bladder changes, saddle anesthesia, and QUINCY LE N/T.    ASSESSMENT  Zenobia presents to physical therapy evaluation with primary c/o right gluteal pain. The results of the objective tests and measures show limited ROM, weakness and pain with movement.  Functional deficits include but are not limited to walking, sleeping and housework.  Signs and symptoms are consistent with diagnosis of Greater trochanteric bursitis of right hip and Gluteal tendinitis of right buttock . Pt and PT discussed evaluation findings, pathology, POC and HEP.  Pt voiced understanding and performs HEP correctly without reported pain. Skilled Physical Therapy is medically necessary to address the above impairments and reach functional goals.     OBJECTIVE:   Observation/Posture: no effect upright   Neuro Screen: intact slump test    Lumbar AROM: (* denotes performed with pain)  Flexion: min  Extension:  min*  Sidebending: R min/mod*; L intact      Hip AROM: (* denotes performed with pain)  Flexion: min R*, L intact  Extension: intact  Extension with internal rotation R*: mod, L intact  Extension with extension rotation R*: mod, L intact  SHI R*: mod, L intact  External rotation supine R*: mod, L intact  Internal rotation supine R*: mod, L intact  Prone hip internal/external rotation: intact bilaterally  FADIR: intact    Sacral tests:  Compression, distraction, thigh thrust, sacral thrust: all intact    Palpation: tender R glut/greater trochanter*    Strength: (* denotes performed with pain)  LE   Hip flexion (L2): R 4/5*; L 5/5  Hip abduction: R not tested/5; L not tested/5  Hip Extension: R 5/5; L 5/5   Hip ER: R 4-/5*; L 5/5  Hip IR: R 3-/5*; L 5/5  Knee Flexion: R 5/5; L 5/5   Knee extension (L3): R 5/5; L 5/5   DF (L4): R 5/5; L 5/5  PF (S1): R 5/5; L 5/5     Flexibility:   LE   Hip Flexor: R min, L min  Hamstrings: R min; L min  Piriformis: R min; L min  Quads: R min; L min  Gastroc-soleus: R min; L min     Special tests:   Side glide in doorway hips to the left*    Today’s Treatment and Response:   R side glide in doorway x10 (hips to the L): produce, after worse walking now more painful    Flexion/rotation knees to the R therapist forces dynamic x20 mid-range with 60 degrees hip flexion: better, pain in walking abolished, no change to side glide, patient forces 30': no better    Flexion/rotation knees to the L therapist forces dynamic x20 mid-range with 60 degrees hip flexion: no effect    Hip external rotation sitting 10x2: a bit better 1st set, no effect 2nd set    Hip internal rotation supine* at about 60 degrees hip flexion: produce intense pain and much worse after x1*    Prone lying 30 seconds: no better    R thoracic rotation x10x3: worse 3rd set in doorframe for lateral overpressure (perhaps just more sore)    L thoracic rotation x6: better    Pt education was provided on exam findings, treatment  diagnosis, treatment plan, expectations, and prognosis. Pt was also provided recommendations for detrimental fear avoidance behaviors and importance of remaining active  Patient was instructed in and issued a HEP for: L thoracic rotation     Charges: PT Eval Low Complexity, 2 there ex      Total Timed Treatment: 23 min     Total Treatment Time: 38 min       PLAN OF CARE:    Goals: (to be met in 12 visits)   Pt will improve ROSIO score from 28/100 to 14/100 to display improved ability to walking, sleeping and housework  Pt will reduce pain at its worst from 9/10 to 6/10 to allow for improved ability to walking, sleeping and housework  Pt will improve hip internal rotation MMT from 3-/5 to 3+/5 allow for improved ability to walking, sleeping and housework  Pt will be independent with home program to allow for maintenance of goals achieved in therapy.     Frequency / Duration: Patient will be seen for 2 x/week or a total of 12 visits over a 90 day period. Treatment will include: Manual Therapy, Neuromuscular Re-education, Self-Care Home Management, Therapeutic Activities, Therapeutic Exercise, and Home Exercise Program instruction    Education or treatment limitation: None  Rehab Potential:good    Oswestry Disability Index Score  Score: 28 % (1/29/2024  3:09 PM)      Patient/Family/Caregiver was advised of these findings, precautions, and treatment options and has agreed to actively participate in planning and for this course of care.    Thank you for your referral. Please co-sign or sign and return this letter via fax as soon as possible to [phone]. If you have any questions, please contact me at Dept: [phone]    Sincerely,  Electronically signed by therapist: Keenan Land PT    Physician's certification required: Yes  I certify the need for these services furnished under this plan of treatment and while under my care.    X___________________________________________________  Date____________________    Certification From: 2/1/2024  To:5/1/2024

## 2024-02-02 ENCOUNTER — OFFICE VISIT (OUTPATIENT)
Dept: PHYSICAL THERAPY | Age: 44
End: 2024-02-02
Attending: PHYSICIAN ASSISTANT
Payer: MEDICAID

## 2024-02-02 PROCEDURE — 97110 THERAPEUTIC EXERCISES: CPT

## 2024-02-02 PROCEDURE — 97140 MANUAL THERAPY 1/> REGIONS: CPT

## 2024-02-02 NOTE — PROGRESS NOTES
Insurance (Authorized # of Visits):  [facility] 2/9 visits, expiration date 4/1/2024        Authorizing Physician: Dr. Arshad  Next MD visit: none scheduled    Fall Risk: standard         Precautions: n/a             Diagnosis:   Greater trochanteric bursitis of right hip (M70.61)  Gluteal tendinitis of right buttock (M76.01)        Referring Provider: Jeancarlos   Date of Evaluation:    2/1/2024  Precautions:  None  Next MD visit:   none scheduled  Date of Surgery: n/a      Subjective:   Did some of home program. Worse, pain down to R ankle last night at 1am while sleeping, could not sleep much at all.    Zenobia Mccoy is a 43 year old female who presents to therapy today with complaints of right gluteal pain  Pt describes pain level at worst 9/10.      Current functional limitations include walking, sleeping and housework (get up from bed, rising from couch) Sleeps on stomach or left side (would like to sleep on R side).      Variable     Describes obstruction      Objective:   (Pain with *)  Tested 2/2/2024:    Lumbar AROM: (* denotes performed with pain)  Extension: intact  Sidebending: R severe*; L min*     Strength: (* denotes performed with pain)  LE   Hip flexion (L2): R 5/5; L 5/5  Hip abduction: R 2-/5, 5/5 L  Hip ER: R 4-/5*; L 5/5  Hip IR: R 3-/5*; L 5/5        Special tests:  Prone lying*  Supine lying*         Tested 2/1/2024:  Hip AROM: (* denotes performed with pain)  Flexion: min R*, L intact  Extension with internal rotation R*: mod, L intact  Extension with extension rotation R*: mod, L intact  SHI R*: mod, L intact  External rotation supine R*: mod, L intact  Internal rotation supine R*: mod, L intact    Palpation: tender R glut/greater trochanter*     Today’s Treatment and Response:   R side glide in doorway x10 (hips to the L): produce, after worse walking now more painful     Flexion/rotation knees to the R therapist forces dynamic x20 mid-range with 60 degrees hip flexion: better, pain in  walking abolished, no change to side glide, patient forces 30': no better    Flexion/rotation knees to the L therapist forces dynamic x20 mid-range with 60 degrees hip flexion: no effect     Hip external rotation sitting 10x2: a bit better 1st set, no effect 2nd set     Hip internal rotation supine* at about 60 degrees hip flexion: produce intense pain and much worse after x1*     R thoracic rotation x10x3: worse 3rd set in doorframe for lateral overpressure (perhaps just more sore)         Assessment: Pain centralized from R buttock to R low back in today's session. Improved ROM of hip external and internal rotation after lumbar stretching. Possible that some pain is referred from her low back. Will benefit from additional Physical Therapy to reach remaining goals.     Goals: (to be met in 12 visits)   Pt will improve ROSIO score from 28/100 to 14/100 to display improved ability to walking, sleeping and housework  Pt will reduce pain at its worst from 9/10 to 6/10 to allow for improved ability to walking, sleeping and housework  Pt will improve hip internal rotation MMT from 3-/5 to 3+/5 allow for improved ability to walking, sleeping and housework  Pt will be independent with home program to allow for maintenance of goals achieved in therapy.     Plan:    Exhaust prone press up hips to the R  -See flexion/rotation knees to the R 3 minutes sustained  -See about left too (if no better)  -Then prone lying after (progress from here)    Never done:  Prone hips to the L  L side glide (hips to the R)  Flexion in step standing (either leg)  Extension in standing  Flexion in lying (or any variation on flexion)    Hip movements:  Hip external rotation stretching sitting on ground  Hip extension  Hip flexion    Dry needling, ask on this    Manual therapy:  Extension/rotation mobilization: worse after    Strength/flexibility:  gluteal stretching/strengthening exercises as well as iliotibial band stretches      Charges: 2 there  ex, 1 manual therapy     Total Timed Treatment: 38 min  Total Treatment Time: 38 min  Date: 2/2/2024  Tx#: 2 Date:   Tx#: 3 Date:  Tx#: 4 Date:  Tx#: 5 Date:  Tx#: 6   Ther-Ex 23 minutes    Prone press up x10: worse, more pain standing    L rotation thoracic x10: worse less motion side glide    Prone hips to the R 5-10x3: better    Educated on home program, see below.  Verbal, visual and tactile cues for there ex.           Manual 15 minutes:  Extension/rotation mobilization on back (most painful spot is L5/S1 on R): after worse         N Re-Ed

## 2024-02-05 ENCOUNTER — OFFICE VISIT (OUTPATIENT)
Dept: PHYSICAL THERAPY | Age: 44
End: 2024-02-05
Attending: PHYSICIAN ASSISTANT
Payer: MEDICAID

## 2024-02-05 PROCEDURE — 97110 THERAPEUTIC EXERCISES: CPT

## 2024-02-05 PROCEDURE — 97140 MANUAL THERAPY 1/> REGIONS: CPT

## 2024-02-05 NOTE — PROGRESS NOTES
Insurance (Authorized # of Visits):  [facility] 3/9 visits, expiration date 4/1/2024        Authorizing Physician: Dr. Arshad  Next MD visit: none scheduled    Fall Risk: standard         Precautions: n/a             Diagnosis:   Greater trochanteric bursitis of right hip (M70.61)  Gluteal tendinitis of right buttock (M76.01)        Referring Provider: Jeancarlos   Date of Evaluation:    2/1/2024  Precautions:  None  Next MD visit:   none scheduled  Date of Surgery: n/a      Subjective:   Did some of home program. Felt better, credits manual therapy. Worse today, credits lifting water the day prior.    Zenobia Mccoy is a 43 year old female who presents to therapy today with complaints of right gluteal pain  Pt describes pain level at worst 5/10.      Current functional limitations include walking, sleeping and housework (get up from bed, rising from couch) Sleeps on stomach or left side (would like to sleep on R side).      Variable     Describes obstruction      Objective:   (Pain with *)  Tested 2/5/2024:  Special tests:  Gait*    Lumbar AROM: (* denotes performed with pain)  Sidebending: R min*; L min*     Strength: (* denotes performed with pain)  LE   Hip abduction: R 2-/5, 5/5 L (tested 2/2/2024)  Hip ER: R 5/5; L 5/5  Hip IR: R 5/5; L 5/5        Hip AROM: (* denotes performed with pain)    Extension with internal rotation*: mod bilaterally (pain on R)  Extension with extension rotation: *: mod bilaterally (pain on R)  SHI: intact bilaterally  Flexion: min R*, L intact    External rotation supine R*: mod, L intact  Internal rotation supine R*: mod, L intact    Palpation: tender R glut/greater trochanter*         Tested 2/1/2024:  Today’s Treatment and Response:   R side glide in doorway x10 (hips to the L): produce, after worse walking now more painful     Flexion/rotation knees to the R therapist forces dynamic x20 mid-range with 60 degrees hip flexion: better, pain in walking abolished, no change to side  glide, patient forces 30': no better    Flexion/rotation knees to the L therapist forces dynamic x20 mid-range with 60 degrees hip flexion: no effect     Hip external rotation sitting 10x2: a bit better 1st set, no effect 2nd set     Hip internal rotation supine* at about 60 degrees hip flexion: produce intense pain and much worse after x1*     R thoracic rotation x10x3: worse 3rd set in doorframe for lateral overpressure (perhaps just more sore)         Assessment: Reports less pain, appears to be improving. Pain centralized from R buttock to R low back in today's session. Improved ROM of hip external and internal rotation after lumbar stretching. Possible that some pain is referred from her low back. Will benefit from additional Physical Therapy to reach remaining goals. Home program progressed for strength.     Goals: (to be met in 12 visits)   Pt will improve ROSIO score from 28/100 to 14/100 to display improved ability to walking, sleeping and housework  Pt will reduce pain at its worst from 9/10 to 6/10 to allow for improved ability to walking, sleeping and housework  Pt will improve hip internal rotation MMT from 3-/5 to 3+/5 allow for improved ability to walking, sleeping and housework  Pt will be independent with home program to allow for maintenance of goals achieved in therapy.     Plan:    Exhaust prone press up hips to the R  -Can she go up to full height.    -See flexion/rotation knees to the R 3 minutes sustained  -See about left too (if no better)  -Then prone lying after (progress from here)    Never done:  Prone hips to the L  L side glide (hips to the R)  Flexion in step standing (either leg)  Extension in standing  Flexion in lying (or any variation on flexion)    Hip movements:  Hip external rotation stretching sitting on ground  Hip extension  Hip flexion    Dry needling, ask on this    Manual therapy:  Extension/rotation mobilization: worse after    Strength/flexibility:  gluteal  stretching/strengthening exercises as well as iliotibial band stretches    Worse:  Straight leg raise      Charges: 2 there ex, 1 manual therapy     Total Timed Treatment: 38 min  Total Treatment Time: 38 min  Date: 2/2/2024  Tx#: 2 Date: 2/5/2024  Tx#: 3 Date:  Tx#: 4 Date:  Tx#: 5 Date:  Tx#: 6   Ther-Ex 23 minutes    Prone press up x10: worse, more pain standing    L rotation thoracic x10: worse less motion side glide    Prone hips to the L 5-10x3: better    Educated on home program, see below.  Verbal, visual and tactile cues for there ex.     Ther-Ex 23 minutes  Prone press up hips to the L x15: patient forces no external force for lateral force. X2: better    Slouch-overcorrection x10: better x2    Straight leg raise x10: worse    Clams x10: better    L rotation thoracic x10: no effect (has continued to do some of this exercise)    Educated on home program, see below.  Verbal, visual and tactile cues for there ex.      Manual 15 minutes:  Extension/rotation mobilization on back (most painful spot is L5/S1 on R): after worse   Manual 8 minutes:  Extension/rotation mobilization on back (most painful spot is L5/S1 on R): after more sore, but no no loss of motion    Lateral overpressure to hips to the L      N Re-Ed

## 2024-02-07 ENCOUNTER — OFFICE VISIT (OUTPATIENT)
Dept: PHYSICAL THERAPY | Age: 44
End: 2024-02-07
Attending: PHYSICIAN ASSISTANT
Payer: MEDICAID

## 2024-02-07 PROCEDURE — 97110 THERAPEUTIC EXERCISES: CPT

## 2024-02-07 PROCEDURE — 97140 MANUAL THERAPY 1/> REGIONS: CPT

## 2024-02-07 NOTE — PROGRESS NOTES
Insurance (Authorized # of Visits):  [facility] 4/9 visits, expiration date 4/1/2024        Authorizing Physician: Dr. Arshad  Next MD visit: none scheduled    Fall Risk: standard         Precautions: n/a             Diagnosis:   Greater trochanteric bursitis of right hip (M70.61)  Gluteal tendinitis of right buttock (M76.01)        Referring Provider: Jeancarlos   Date of Evaluation:    2/1/2024  Precautions:  None  Next MD visit:   none scheduled  Date of Surgery: n/a      Subjective:   Did home program. She thinks better, with less pain walking, but is sore.    Zenobia Mccoy is a 43 year old female who presents to therapy today with complaints of right gluteal pain  Pt describes pain level at worst 5/10.      Current functional limitations include walking, sleeping and housework (get up from bed, rising from couch) Sleeps on stomach or left side (would like to sleep on R side).      Variable     Describes obstruction      Objective:   (Pain with *)  Tested 2/7/2024:  Lumbar AROM: (* denotes performed with pain)  Sidebending: R min*; L min/nil     Strength: (* denotes performed with pain)  LE   Hip abduction: R 5/5, 5/5 L         Hip AROM: (* denotes performed with pain)    Extension with internal rotation*: min, L intact  Extension with extension rotation*: min, L intact    Flexion: min R*, L intact    External rotation supine: intact  Internal rotation supine: intact    Palpation: tender R glut/greater trochanter*    Special tests:  Clams on R side* (tender on greater trochanter) and if rolled more on posterior buttock pain in back down towards.         Tested 2/1/2024:  Today’s Treatment and Response:   R side glide in doorway x10 (hips to the L): produce, after worse walking now more painful     Flexion/rotation knees to the R therapist forces dynamic x20 mid-range with 60 degrees hip flexion: better, pain in walking abolished, no change to side glide, patient forces 30': no better    Flexion/rotation knees  [facility] ambulatory encounter  GYN OFFICE VISIT    SUBJECTIVE:  Cecilia Farooq is a 33 year old       seen today for   Chief Complaint   Patient presents with   • Breast Problem     itching and burning sensation right breast nipple     She has noticed pruritis, burning sensation on her right nipple since November.  She quit breastfeeding the beginning of last year.  She denies any new soaps, detergent, lotions.  She typically does not put lotion on her breasts.  She has a h/o eczema but usually does not have pruritis with it.  Patient denies any new lumps in the breast.  She denies any peeling/flaking of the skin.  She has put coconut oil and Lanolin on it with no improvement.  Patient is able to express a small amount of normal appearing milk if she tries, otherwise she does not leak.  She wears sports bras daily.  Patient also mildly concerned about her very light menstrual cycles.  Her ParaGard was removed in September of last year and since that time her menstrual cycles have occurred about every 24 days and last about one day.  She may have a few days of spotting too.  Patient is wondering if this is normal.    I have reviewed the patient's vital signs, medications, and allergies, past medical, surgical, social and family history, updating these as appropriate. See Histories section of the EMR for a display of this information.     ALLERGIES:  No Known Allergies    Current Outpatient Medications   Medication Sig Dispense Refill   • Omega-3 Fatty Acids (FISH OIL) 1000 MG capsule Take 2 g by mouth daily.     • Probiotic Product (PROBIOTIC ADVANCED PO)      • paragard copper intrauterine device 1 Device by Intrauterine route.     • cholecalciferol (VITAMIN D3) 1000 UNITS tablet Take 1,000 Units by mouth daily.     • Prenatal Vit-Fe Fumarate-FA (PRENATAL VITAMIN PO) Take 1 tablet by mouth daily.     • miconazole (Miconazole Antifungal) 2 % cream Apply topically 2 times daily. 30 g 0     No current  facility-administered medications for this visit.        Complete ROS otherwise negative except noted above.     Physical Exam:    Vitals:   Visit Vitals  /60   Pulse 81   Ht 5' 4\" (1.626 m)   Wt 52.5 kg   BMI 19.88 kg/m²     General: this is a 33 year old-year-old woman appearing stated age in no acute distress, alert and oriented to person, place and time. Mood and affect are normal  Head:  Normocephalic, without obvious abnormality, atraumatic.   Breasts: no dominant masses bilaterally.  Bilateral nipples without any erythema, rashes, lesions.  Hint of milk at nipple with gentle expression.  No signs of excoriation.  Extremities: no pedal edema, well perfused  Skin: Skin color, texture, turgor normal, no rashes or lesions.    Assessment/PLAN:     1. Nipple symptom or sign in female  Patient is a 33-year-old  two para two female with pruritus and burning of the right nipple/areola.  No abnormal findings on exam today.  I recommend the patient use the coconut oil or a lotions such as set of fill.  Also, she should wash her sports bras in hot water and hang to dry.  I will order a topical antifungal to see if this will help her symptoms.  If she does not see improvement after a week, she should contact the office.  Reassurance provided about the patient's light menstrual cycles.  If she stops having menstrual cycles I will order hormonal studies.         Return if symptoms worsen or fail to improve, for follow up or call if symptoms are not improved after 1 week..    Instructions provided as documented in the AVS.                     to the L therapist forces dynamic x20 mid-range with 60 degrees hip flexion: no effect     Hip external rotation sitting 10x2: a bit better 1st set, no effect 2nd set     Hip internal rotation supine* at about 60 degrees hip flexion: produce intense pain and much worse after x1*     R thoracic rotation x10x3: worse 3rd set in doorframe for lateral overpressure (perhaps just more sore)         Assessment:  Reports less pain, appears to be improving. Improved ROM of hip external and internal rotation after lumbar stretching. Possible that some pain is referred from her low back. Home program progressed for strength and stretching, able to walk with less pain now.    Goals: (to be met in 12 visits)   Pt will improve ROSIO score from 28/100 to 14/100 to display improved ability to walking, sleeping and housework  Pt will reduce pain at its worst from 9/10 to 6/10 to allow for improved ability to walking, sleeping and housework  Pt will improve hip internal rotation MMT from 3-/5 to 3+/5 allow for improved ability to walking, sleeping and housework  Pt will be independent with home program to allow for maintenance of goals achieved in therapy.     Plan:    Exhaust flexion/rotation knees to the right  -Can she go up to full height prone press up with strap now  -if so find ideal lumbar level    (if no better)  -See about flexion/rotation knees to the left      Never done:  Prone hips to the R  L side glide (hips to the R)  Flexion in step standing (either leg)  Extension in standing  Flexion in lying (or any variation on flexion)    Hip movements:  Hip external rotation stretching sitting on ground  Hip extension  Hip flexion    Dry needling, ask on this    Manual therapy:  Extension/rotation mobilization: worse after    Strength/flexibility:  gluteal stretching/strengthening exercises as well as iliotibial band stretches    Worse:  Straight leg raise    Produce, no worse (clams on R side)      Charges: 2 there ex, 1 manual  therapy     Total Timed Treatment: 38 min  Total Treatment Time: 38 min  Date: 2/2/2024  Tx#: 2 Date: 2/5/2024  Tx#: 3 Date: 2/7/2024  Tx#: 4 Date:  Tx#: 5 Date:  Tx#: 6   Ther-Ex 23 minutes    Prone press up x10: worse, more pain standing    L rotation thoracic x10: worse less motion side glide    Prone hips to the L 5-10x3: better    Educated on home program, see below.  Verbal, visual and tactile cues for there ex.     Ther-Ex 23 minutes  Prone press up hips to the L x15: patient forces no external force for lateral force. X2: better    Slouch-overcorrection x10: better x2    Straight leg raise x10: worse    Clams x10: better    L rotation thoracic x10: no effect (has continued to do some of this exercise)    Educated on home program, see below.  Verbal, visual and tactile cues for there ex. Ther-Ex 23 minutes  Prone press up hips to the L x15 x2: a bit better    Prone press up x10x2 with overpressure (worse after 2nd set after mobilization)    Flexion/rotation knees to the R 1-3 minutes x3: much better    Prone press up x10: maybe better    IT band stretch standing with side bending x30'x3    Bridge x10:    Slouch-overcorrection x10x    Clams x3:     Educated on home program, see below.  Verbal, visual and tactile cues for there ex.     Manual 15 minutes:  Extension/rotation mobilization on back (most painful spot is L5/S1 on R): after worse   Manual 8 minutes:  Extension/rotation mobilization on back (most painful spot is L5/S1 on R): after more sore, but no no loss of motion    Lateral overpressure to hips to the L Manual 8 minutes:  Extension/rotation mobilization on back (most painful spot is L5/S1 on R): after more sore, but no no loss of motion    Lateral overpressure to hips to the L     N Re-Ed

## 2024-02-12 ENCOUNTER — OFFICE VISIT (OUTPATIENT)
Dept: PHYSICAL THERAPY | Age: 44
End: 2024-02-12
Attending: PHYSICIAN ASSISTANT
Payer: MEDICAID

## 2024-02-12 DIAGNOSIS — M70.61 GREATER TROCHANTERIC BURSITIS OF RIGHT HIP: Primary | ICD-10-CM

## 2024-02-12 DIAGNOSIS — M76.01 GLUTEAL TENDINITIS OF RIGHT BUTTOCK: ICD-10-CM

## 2024-02-12 PROCEDURE — 97140 MANUAL THERAPY 1/> REGIONS: CPT

## 2024-02-12 PROCEDURE — 97110 THERAPEUTIC EXERCISES: CPT

## 2024-02-12 NOTE — PROGRESS NOTES
Progress Summary  Pt has attended 5 visits in Physical Therapy.       Insurance (Authorized # of Visits):  [facility] 5/9 visits, expiration date 4/1/2024        Authorizing Physician: Dr. Arshad  Next MD visit: none scheduled    Fall Risk: standard         Precautions: n/a             Diagnosis:   Greater trochanteric bursitis of right hip (M70.61)  Gluteal tendinitis of right buttock (M76.01)        Referring Provider: Jaencarlos   Date of Evaluation:    2/1/2024  Precautions:  None  Next MD visit:   none scheduled  Date of Surgery: n/a      Subjective:   Did home program. Less pain when she gets up    Zenobia Mccoy is a 43 year old female who presents to therapy today with complaints of right gluteal pain  Pt describes pain level at worst 2/10.      Current functional limitations include after sitting a while and walking, or prolonged standing. No longer pain sleeping or with housework      Variable     Describes obstruction      Objective:   (Pain with *)  Tested 2/12/2024:  Lumbar AROM: (* denotes performed with pain)  Sidebending: R min*; L min/nil  Extension*     Hip AROM: (* denotes performed with pain)    Extension with internal rotation: intact bilaterally  Extension with extension rotation*: min, L intact    Flexion: min R*, L intact      Palpation: tender R low back*    Testes 2/7/2024:  Special tests:  Clams on R side* (tender on greater trochanter) and if rolled more on posterior buttock pain in back down towards.         Tested 2/1/2024:  Today’s Treatment and Response:   R side glide in doorway x10 (hips to the L): produce, after worse walking now more painful     Flexion/rotation knees to the R therapist forces dynamic x20 mid-range with 60 degrees hip flexion: better, pain in walking abolished, no change to side glide, patient forces 30': no better    Flexion/rotation knees to the L therapist forces dynamic x20 mid-range with 60 degrees hip flexion: no effect     Hip external rotation sitting  10x2: a bit better 1st set, no effect 2nd set     Hip internal rotation supine* at about 60 degrees hip flexion: produce intense pain and much worse after x1*     R thoracic rotation x10x3: worse 3rd set in doorframe for lateral overpressure (perhaps just more sore)         Assessment:  Better, much better in the morning and no longer pain sleeping or with housework. However, pain still with prolonged standing or walking after prolonged sitting. Will benefit from additional Physical Therapy to reach remaining goals.    Goals: (to be met in 12 visits)   Pt will improve ROSIO score from 28/100 to 14/100 to display improved ability to walking, sleeping and housework  Pt will reduce pain at its worst from 9/10 to 6/10 to allow for improved ability to walking, sleeping and housework  Pt will improve hip internal rotation MMT from 3-/5 to 3+/5 allow for improved ability to walking, sleeping and housework  Pt will be independent with home program to allow for maintenance of goals achieved in therapy.     Post Oswestry Disability Index Score  Post Score: 2 % (2/12/2024 12:05 PM)    26 % improvement    Plan: Continue skilled Physical Therapy 1 x/week or a total of 12 visits over a 90 day period. Treatment will include: manual therapy, therapeutic exercise, therapeutic activites, neuromuscular reeducation and home program        Patient/Family/Caregiver was advised of these findings, precautions, and treatment options and has agreed to actively participate in planning and for this course of care.    Thank you for your referral. If you have any questions, please contact me at Dept: [phone].    Sincerely,  Electronically signed by therapist: Keenan Land PT     Physician's certification required:  No  Please co-sign or sign and return this letter via fax as soon as possible to [phone].   I certify the need for these services furnished under this plan of treatment and while under my  care.    X___________________________________________________ Date____________________    Certification From: 2/12/2024  To:5/12/2024       Additional ideas:  Sustained lumbar extension prone 3 minutes, get consent    Exhaust L3 mobilization/overpressure or go lateral    If lateral:  Can she do side glide R (hips to the L) now hips to the L prone press up    Maybe:  Continue flexion/rotation knees to the right  -If she needs this, manual therapy here to reduce lateral component    (if no better)  -See about flexion/rotation knees to the left      Never done:  Prone hips to the R  L side glide (hips to the R)  Flexion in step standing (either leg)  Extension in standing    (Get her to do this now)  Flexion in lying (or any variation on flexion)        Hip movements:  Hip external rotation stretching sitting on ground  Hip extension  Hip flexion    Dry needling, ask on this    Manual therapy:  Extension/rotation mobilization: worse after    Strength/flexibility:  gluteal stretching/strengthening exercises as well as iliotibial band stretches    Worse:  Straight leg raise  Quadruped hip extension    Produce, no worse (clams on R side)      Charges: 2 there ex, 1 manual therapy     Total Timed Treatment: 38 min  Total Treatment Time: 38 min  Date: 2/2/2024  Tx#: 2 Date: 2/5/2024  Tx#: 3 Date: 2/7/2024  Tx#: 4 Date: 2/12/2024  Tx#: 5 Date:  Tx#: 6   Ther-Ex 23 minutes    Prone press up x10: worse, more pain standing    L rotation thoracic x10: worse less motion side glide    Prone hips to the L 5-10x3: better    Educated on home program, see below.  Verbal, visual and tactile cues for there ex.     Ther-Ex 30 minutes  Prone press up hips to the L x15: patient forces no external force for lateral force. X2: better    Slouch-overcorrection x10: better x2    Straight leg raise x10: worse    Clams x10: better    L rotation thoracic x10: no effect (has continued to do some of this exercise)    Educated on home program, see  below.  Verbal, visual and tactile cues for there ex. Ther-Ex 30 minutes  Prone press up hips to the L x15 x2: a bit better    Prone press up x10x2 with overpressure (worse after 2nd set after mobilization)    Flexion/rotation knees to the R 1-3 minutes x3: much better    Prone press up x10: maybe better    IT band stretch standing with side bending x30'x3    Bridge x10:    Slouch-overcorrection x10x    Clams x3:     Educated on home program, see below.  Verbal, visual and tactile cues for there ex. Ther-Ex 30 minutes  Flexion/rotation knees to the R 1-3 minutes x3: no better, patient forces    Prone press up x10 x2 with strap: better (3 more sets with therapist overpressure)    Quad stretch on step 30 seconds x2:    Quadruped hip extension x10: worse      Educated on home program, see below.  Verbal, visual and tactile cues for there ex.    Manual 15 minutes:  Extension/rotation mobilization on back (most painful spot is L5/S1 on R): after worse   Manual 8 minutes:  Extension/rotation mobilization on back (most painful spot is L5/S1 on R): after more sore, but no no loss of motion    Lateral overpressure to hips to the L Manual 8 minutes:  Extension/rotation mobilization on back (most painful spot is L5/S1 on R): after more sore, but no no loss of motion    Lateral overpressure to hips to the L Manual 8 minutes:  Extension mobilization lumbar spine L5 and L3 and L1 as well as overpressure: more better at L3, maybe a bit better at L1        N Re-Ed

## 2024-02-19 ENCOUNTER — OFFICE VISIT (OUTPATIENT)
Dept: PHYSICAL THERAPY | Age: 44
End: 2024-02-19
Attending: PHYSICIAN ASSISTANT
Payer: MEDICAID

## 2024-02-19 ENCOUNTER — LAB ENCOUNTER (OUTPATIENT)
Dept: LAB | Age: 44
End: 2024-02-19
Attending: NURSE PRACTITIONER
Payer: MEDICAID

## 2024-02-19 DIAGNOSIS — E66.9 DIABETES MELLITUS TYPE 2 IN OBESE (HCC): ICD-10-CM

## 2024-02-19 DIAGNOSIS — E03.9 ACQUIRED HYPOTHYROIDISM: ICD-10-CM

## 2024-02-19 DIAGNOSIS — E78.2 ELEVATED CHOLESTEROL WITH ELEVATED TRIGLYCERIDES: ICD-10-CM

## 2024-02-19 DIAGNOSIS — E11.69 DIABETES MELLITUS TYPE 2 IN OBESE (HCC): ICD-10-CM

## 2024-02-19 DIAGNOSIS — E03.9 HYPOTHYROIDISM, UNSPECIFIED TYPE: ICD-10-CM

## 2024-02-19 LAB
ALBUMIN SERPL-MCNC: 4 G/DL (ref 3.4–5)
ALBUMIN/GLOB SERPL: 1.1 {RATIO} (ref 1–2)
ALP LIVER SERPL-CCNC: 50 U/L
ALT SERPL-CCNC: 50 U/L
ANION GAP SERPL CALC-SCNC: 2 MMOL/L (ref 0–18)
AST SERPL-CCNC: 20 U/L (ref 15–37)
BILIRUB SERPL-MCNC: 2 MG/DL (ref 0.1–2)
BUN BLD-MCNC: 22 MG/DL (ref 9–23)
CALCIUM BLD-MCNC: 9.4 MG/DL (ref 8.5–10.1)
CHLORIDE SERPL-SCNC: 111 MMOL/L (ref 98–112)
CHOLEST SERPL-MCNC: 137 MG/DL (ref ?–200)
CO2 SERPL-SCNC: 25 MMOL/L (ref 21–32)
CREAT BLD-MCNC: 1.05 MG/DL
EGFRCR SERPLBLD CKD-EPI 2021: 68 ML/MIN/1.73M2 (ref 60–?)
EST. AVERAGE GLUCOSE BLD GHB EST-MCNC: 143 MG/DL (ref 68–126)
FASTING PATIENT LIPID ANSWER: YES
FASTING STATUS PATIENT QL REPORTED: YES
GLOBULIN PLAS-MCNC: 3.6 G/DL (ref 2.8–4.4)
GLUCOSE BLD-MCNC: 126 MG/DL (ref 70–99)
HBA1C MFR BLD: 6.6 % (ref ?–5.7)
HDLC SERPL-MCNC: 45 MG/DL (ref 40–59)
LDLC SERPL CALC-MCNC: 79 MG/DL (ref ?–100)
NONHDLC SERPL-MCNC: 92 MG/DL (ref ?–130)
OSMOLALITY SERPL CALC.SUM OF ELEC: 291 MOSM/KG (ref 275–295)
POTASSIUM SERPL-SCNC: 4.6 MMOL/L (ref 3.5–5.1)
PROT SERPL-MCNC: 7.6 G/DL (ref 6.4–8.2)
SODIUM SERPL-SCNC: 138 MMOL/L (ref 136–145)
TRIGL SERPL-MCNC: 64 MG/DL (ref 30–149)
TSI SER-ACNC: 1.79 MIU/ML (ref 0.36–3.74)
VLDLC SERPL CALC-MCNC: 10 MG/DL (ref 0–30)

## 2024-02-19 PROCEDURE — 83036 HEMOGLOBIN GLYCOSYLATED A1C: CPT

## 2024-02-19 PROCEDURE — 84443 ASSAY THYROID STIM HORMONE: CPT

## 2024-02-19 PROCEDURE — 80053 COMPREHEN METABOLIC PANEL: CPT

## 2024-02-19 PROCEDURE — 80061 LIPID PANEL: CPT

## 2024-02-19 PROCEDURE — 97110 THERAPEUTIC EXERCISES: CPT

## 2024-02-19 PROCEDURE — 36415 COLL VENOUS BLD VENIPUNCTURE: CPT

## 2024-02-19 NOTE — PROGRESS NOTES
Insurance (Authorized # of Visits):  [facility] 6/9 visits, expiration date 4/1/2024        Authorizing Physician: Dr. Arshad  Next MD visit: none scheduled    Fall Risk: standard         Precautions: n/a             Diagnosis:   Greater trochanteric bursitis of right hip (M70.61)  Gluteal tendinitis of right buttock (M76.01)        Referring Provider: Jeancarlos   Date of Evaluation:    2/1/2024  Precautions:  None  Next MD visit:   none scheduled  Date of Surgery: n/a      Subjective:   Did home program. Better, almost no pain, a little pain today, credits soreness with exercises.    Zenobia Mccoy is a 43 year old female who presents to therapy today with complaints of right gluteal pain  Pt describes pain level at worst 1/10.      Current functional limitations include after sitting a while and walking, or prolonged standing. No longer pain sleeping or with housework      Variable     Describes obstruction      Objective:   (Pain with *)  Tested 2/19/2024:    Lumbar AROM: (* denotes performed with pain)  Sidebending: R min*; L min/nil  Extension: intact     Hip AROM: (* denotes performed with pain)  Extension with extension rotation*: min, L intact    Flexion: intact      Palpation: tender R low back*    Testes 2/7/2024:  Special tests:  Clams on R side* (tender on greater trochanter) and if rolled more on posterior buttock pain in back down towards.         Tested 2/1/2024:  Today’s Treatment and Response:   R side glide in doorway x10 (hips to the L): produce, after worse walking now more painful     Flexion/rotation knees to the R therapist forces dynamic x20 mid-range with 60 degrees hip flexion: better, pain in walking abolished, no change to side glide, patient forces 30': no better    Flexion/rotation knees to the L therapist forces dynamic x20 mid-range with 60 degrees hip flexion: no effect     Hip external rotation sitting 10x2: a bit better 1st set, no effect 2nd set     Hip internal rotation supine*  at about 60 degrees hip flexion: produce intense pain and much worse after x1*     R thoracic rotation x10x3: worse 3rd set in doorframe for lateral overpressure (perhaps just more sore)         Assessment: Reports less pain, able to progress to flexion in lying and home program progressed for strength.    Goals: (to be met in 12 visits)   Pt will improve ROSIO score from 28/100 to 14/100 to display improved ability to walking, sleeping and housework MET  Pt will reduce pain at its worst from 9/10 to 6/10 to allow for improved ability to walking, sleeping and housework MET  Pt will improve hip internal rotation MMT from 3-/5 to 3+/5 allow for improved ability to walking, sleeping and housework MET  Pt will be independent with home program to allow for maintenance of goals achieved in therapy.  (95% progress 2/19/2024)    New Goals 2/12/2012:  Pt will improve ROSIO score from 2/100 to 0/100 to display improved ability to walking, sleeping and housework   Pt will reduce pain at its worst from 2/10 to 0/10 to allow for improved ability to walking, sleeping and housework  Pt will improve improve side glide ROM from min loss with pain to free and full for improved ability to walking, sleeping and housework    Post Oswestry Disability Index Score  Post Score: 2 % (2/12/2024 12:05 PM)    26 % improvement    Plan:  L3 extension overpressure/mobilization (if no better see lateral, see below)      If lateral:  Can she do side glide R (hips to the L)   now hips to the L prone press up  Prone hips to the R    Maybe:  Continue flexion/rotation knees to the right  -If she needs this, manual therapy here to reduce lateral component    (if no better)  -See about flexion/rotation knees to the left      Never done:  L side glide (hips to the R)  Flexion in step standing (either leg)  Extension in standing    (Get her to do this now)  Flexion in standing        Hip movements:  Hip external rotation stretching sitting on ground  Hip  extension  Hip flexion    Dry needling, ask on this    Manual therapy:  Extension/rotation mobilization: worse after    Strength/flexibility:  gluteal stretching/strengthening exercises as well as iliotibial band stretches    Worse:  Straight leg raise  Quadruped hip extension    Produce, no worse (clams on R side)      Charges: 3 there ex,   Total Timed Treatment: 38 min  Total Treatment Time: 38 min  Date: 2/2/2024  Tx#: 2 Date: 2/5/2024  Tx#: 3 Date: 2/7/2024  Tx#: 4 Date: 2/12/2024  Tx#: 5 Date: 2/19/2024  Tx#: 6     Ther-Ex 23 minutes    Prone press up x10: worse, more pain standing    L rotation thoracic x10: worse less motion side glide    Prone hips to the L 5-10x3: better    Educated on home program, see below.  Verbal, visual and tactile cues for there ex.     Ther-Ex 30 minutes  Prone press up hips to the L x15: patient forces no external force for lateral force. X2: better    Slouch-overcorrection x10: better x2    Straight leg raise x10: worse    Clams x10: better    L rotation thoracic x10: no effect (has continued to do some of this exercise)    Educated on home program, see below.  Verbal, visual and tactile cues for there ex. Ther-Ex 30 minutes  Prone press up hips to the L x15 x2: a bit better    Prone press up x10x2 with overpressure (worse after 2nd set after mobilization)    Flexion/rotation knees to the R 1-3 minutes x3: much better    Prone press up x10: maybe better    IT band stretch standing with side bending x30'x3    Bridge x10:    Slouch-overcorrection x10x    Clams x3:     Educated on home program, see below.  Verbal, visual and tactile cues for there ex. Ther-Ex 30 minutes  Flexion/rotation knees to the R 1-3 minutes x3: no better, patient forces    Prone press up x10 x2 with strap: better (3 more sets with therapist overpressure)    Quad stretch on step 30 seconds x2:    Quadruped hip extension x10: worse      Educated on home program, see below.  Verbal, visual and tactile cues for  there ex. Ther-Ex 38 minutes  Sustained lumbar extension prone 3 minutes: a bit better    L3 mobilization/overpressure prone press up x10    Flexion/rotation knees to the R 1-3 minutes      Flexion in lying x10x2:    Step up 9\" x10x    Hamstring bridge x10    Bridge one leg straight 10x    IT band stretch standing with side bending 30'x      Educated on home program, see below.  Verbal, visual and tactile cues for there ex.     Manual 15 minutes:  Extension/rotation mobilization on back (most painful spot is L5/S1 on R): after worse   Manual 8 minutes:  Extension/rotation mobilization on back (most painful spot is L5/S1 on R): after more sore, but no no loss of motion    Lateral overpressure to hips to the L Manual 8 minutes:  Extension/rotation mobilization on back (most painful spot is L5/S1 on R): after more sore, but no no loss of motion    Lateral overpressure to hips to the L Manual 8 minutes:  Extension mobilization lumbar spine L5 and L3 and L1 as well as overpressure: more better at L3, maybe a bit better at L1          N Re-Ed

## 2024-02-20 ENCOUNTER — OFFICE VISIT (OUTPATIENT)
Facility: CLINIC | Age: 44
End: 2024-02-20
Payer: MEDICAID

## 2024-02-20 VITALS
DIASTOLIC BLOOD PRESSURE: 64 MMHG | WEIGHT: 173 LBS | HEIGHT: 60 IN | BODY MASS INDEX: 33.96 KG/M2 | HEART RATE: 107 BPM | SYSTOLIC BLOOD PRESSURE: 100 MMHG

## 2024-02-20 DIAGNOSIS — Z12.4 CERVICAL CANCER SCREENING: ICD-10-CM

## 2024-02-20 DIAGNOSIS — Z12.31 BREAST CANCER SCREENING BY MAMMOGRAM: ICD-10-CM

## 2024-02-20 DIAGNOSIS — Z01.419 ENCOUNTER FOR WELL WOMAN EXAM WITH ROUTINE GYNECOLOGICAL EXAM: Primary | ICD-10-CM

## 2024-02-20 PROCEDURE — 87624 HPV HI-RISK TYP POOLED RSLT: CPT | Performed by: OBSTETRICS & GYNECOLOGY

## 2024-02-20 PROCEDURE — 88175 CYTOPATH C/V AUTO FLUID REDO: CPT | Performed by: OBSTETRICS & GYNECOLOGY

## 2024-02-20 PROCEDURE — 99396 PREV VISIT EST AGE 40-64: CPT | Performed by: OBSTETRICS & GYNECOLOGY

## 2024-02-20 NOTE — PROGRESS NOTES
Zenobia Mccoy is a 43 year old female  Patient's last menstrual period was 2024 (within days).   Chief Complaint   Patient presents with    Wellness Visit     Last pap smear was 22, negative    Other     Patient said YES to student in the room    .  Patient has no complaints  OBSTETRICS HISTORY:  OB History    Para Term  AB Living   2 2 0 2 0 2   SAB IAB Ectopic Multiple Live Births   0 0 0   2      # Outcome Date GA Lbr Jurgen/2nd Weight Sex Delivery Anes PTL Lv   2 Term 13 38w0d  7 lb 1 oz (3.204 kg) F CS-Unspec      1 Term 12 38w0d  6 lb 6 oz (2.892 kg) M Vag-Spont          GYNE HISTORY:  Periods regular monthly    History   Sexual Activity    Sexual activity: Not on file        Hx Prior Abnormal Pap: No  Pap Date: 22  Pap Result Notes: negative        MEDICAL HISTORY:  Past Medical History:   Diagnosis Date    Diabetes (HCC)     Diabetes mellitus (HCC)        SURGICAL HISTORY:  Past Surgical History:   Procedure Laterality Date      13    Tubal ligation         SOCIAL HISTORY:  Social History     Socioeconomic History    Marital status:      Spouse name: Not on file    Number of children: Not on file    Years of education: Not on file    Highest education level: Not on file   Occupational History    Not on file   Tobacco Use    Smoking status: Never    Smokeless tobacco: Never   Vaping Use    Vaping Use: Never used   Substance and Sexual Activity    Alcohol use: Not Currently     Comment: Rarely    Drug use: No    Sexual activity: Not on file   Other Topics Concern     Service Not Asked    Blood Transfusions Not Asked    Caffeine Concern No     Comment: coffee ocassionally    Occupational Exposure Not Asked    Hobby Hazards Not Asked    Sleep Concern Not Asked    Stress Concern Not Asked    Weight Concern Not Asked    Special Diet Not Asked    Back Care Not Asked    Exercise No    Bike Helmet Not Asked    Seat Belt Yes     Self-Exams Not Asked   Social History Narrative    ** Merged History Encounter **          Social Determinants of Health     Financial Resource Strain: Not on file   Food Insecurity: Not on file   Transportation Needs: Not on file   Physical Activity: Not on file   Stress: Not on file   Social Connections: Not on file   Housing Stability: Not on file       FAMILY HISTORY:  Family History   Problem Relation Age of Onset    Heart Disorder Father     Other (Hypothyroidism) Father     Diabetes Mother     Cancer Mother 67        lymphoma    No Known Problems Daughter     No Known Problems Son     Diabetes Maternal Grandmother     Heart Disorder Maternal Grandmother     Diabetes Maternal Grandfather     Heart Disorder Maternal Grandfather     Diabetes Paternal Grandmother     Heart Disorder Paternal Grandmother     Diabetes Paternal Grandfather     Heart Disorder Paternal Grandfather        MEDICATIONS:    Current Outpatient Medications:     Dulaglutide (TRULICITY) 1.5 MG/0.5ML Subcutaneous Solution Pen-injector, Inject 1.5 mg into the skin once a week., Disp: 2 mL, Rfl: 0    albuterol 108 (90 Base) MCG/ACT Inhalation Aero Soln, Inhale 2 puffs into the lungs every 4 (four) hours as needed (coughing spells)., Disp: 1 each, Rfl: 0    levothyroxine 50 MCG Oral Tab, Take 1 tablet (50 mcg total) by mouth before breakfast., Disp: 90 tablet, Rfl: 0    METFORMIN  MG Oral Tablet 24 Hr, TAKE ONE TABLET BY MOUTH ONE TIME DAILY, Disp: 90 tablet, Rfl: 0    ATORVASTATIN 10 MG Oral Tab, Take 1 tablet by mouth nightly., Disp: 90 tablet, Rfl: 0    celecoxib (CELEBREX) 200 MG Oral Cap, Take 1 capsule (200 mg total) by mouth daily with food., Disp: 30 capsule, Rfl: 0    Meloxicam 15 MG Oral Tab, Take 1 tablet (15 mg total) by mouth daily with breakfast., Disp: 30 tablet, Rfl: 0    ALLERGIES:  No Known Allergies      Review of Systems:  Constitutional:  Denies fatigue, night sweats, hot flashes  Eyes:  denies blurred or double  vision  Cardiovascular:  denies chest pain or palpitations  Respiratory:  denies shortness of breath  Gastrointestinal:  denies heartburn, abdominal pain, diarrhea or constipation  Genitourinary:  denies dysuria, incontinence, abnormal vaginal discharge, vaginal itching  Musculoskeletal:  denies back pain.  Skin/Breast:  Denies any breast pain, lumps, or discharge.   Neurological:  denies headaches, extremity weakness or numbness.  Psychiatric: denies depression or anxiety.  Endocrine:   denies excessive thirst or urination.  Heme/Lymph:  denies history of anemia, easy bruising or bleeding.      PHYSICAL EXAM:   Constitutional: well developed, well nourished  Head/Face: normocephalic  Neck/Thyroid: thyroid symmetric, no thyromegaly, no nodules, no adenopathy  Lymphatic:no abnormal supraclavicular or axillary adenopathy is noted  Breast: normal without palpable masses, tenderness, asymmetry, nipple discharge, nipple retraction or skin changes  Abdomen:  soft, nontender, nondistended, no masses  Skin/Hair: no unusual rashes or bruises  Extremities: no edema, no cyanosis  Psychiatric:  Oriented to time, place, person and situation. Appropriate mood and affect    Pelvic Exam:  External Genitalia: normal appearance, hair distribution, and no lesions  Urethral Meatus:  normal in size, location, without lesions and prolapse  Bladder:  No fullness, masses or tenderness  Vagina:  Normal appearance without lesions, no abnormal discharge  Cervix:  Normal without tenderness on motion  Uterus: normal in size, contour, position, mobility, without tenderness  Adnexa: normal without masses or tenderness  Perineum: normal  Anus: no hemorroids     Assessment & Plan:  Diagnoses and all orders for this visit:    Encounter for well woman exam with routine gynecological exam    Cervical cancer screening  -     Hpv High Risk , Thin Prep Collect; Future  -     ThinPrep PAP Smear B; Future    Breast cancer screening by mammogram  -     [facility]  CORNELIA 2D+3D SCREENING BILAT (CPT=77067/71670); Future

## 2024-02-21 ENCOUNTER — TELEMEDICINE (OUTPATIENT)
Dept: FAMILY MEDICINE CLINIC | Facility: CLINIC | Age: 44
End: 2024-02-21
Payer: MEDICAID

## 2024-02-21 DIAGNOSIS — E78.00 TYPE 2 DIABETES MELLITUS WITH HYPERCHOLESTEROLEMIA (HCC): ICD-10-CM

## 2024-02-21 DIAGNOSIS — E03.9 HYPOTHYROIDISM, UNSPECIFIED TYPE: ICD-10-CM

## 2024-02-21 DIAGNOSIS — E66.9 DIABETES MELLITUS TYPE 2 IN OBESE (HCC): Primary | ICD-10-CM

## 2024-02-21 DIAGNOSIS — E11.69 DIABETES MELLITUS TYPE 2 IN OBESE (HCC): Primary | ICD-10-CM

## 2024-02-21 DIAGNOSIS — E11.69 TYPE 2 DIABETES MELLITUS WITH HYPERCHOLESTEROLEMIA (HCC): ICD-10-CM

## 2024-02-21 DIAGNOSIS — E66.9 OBESITY (BMI 30-39.9): ICD-10-CM

## 2024-02-21 LAB — HPV I/H RISK 1 DNA SPEC QL NAA+PROBE: NEGATIVE

## 2024-02-21 RX ORDER — DULAGLUTIDE 3 MG/.5ML
3 INJECTION, SOLUTION SUBCUTANEOUS WEEKLY
Qty: 9 ML | Refills: 0 | Status: SHIPPED | OUTPATIENT
Start: 2024-02-21 | End: 2024-05-21

## 2024-02-21 RX ORDER — METFORMIN HYDROCHLORIDE 750 MG/1
750 TABLET, EXTENDED RELEASE ORAL DAILY
Qty: 90 TABLET | Refills: 0 | Status: SHIPPED | OUTPATIENT
Start: 2024-02-21

## 2024-02-21 RX ORDER — PHENTERMINE HYDROCHLORIDE 37.5 MG/1
37.5 TABLET ORAL
Qty: 30 TABLET | Refills: 0 | Status: SHIPPED
Start: 2024-02-21 | End: 2024-02-22

## 2024-02-21 RX ORDER — LEVOTHYROXINE SODIUM 0.05 MG/1
50 TABLET ORAL
Qty: 90 TABLET | Refills: 0 | Status: SHIPPED | OUTPATIENT
Start: 2024-02-21

## 2024-02-21 RX ORDER — ATORVASTATIN CALCIUM 10 MG/1
10 TABLET, FILM COATED ORAL NIGHTLY
Qty: 90 TABLET | Refills: 0 | Status: SHIPPED
Start: 2024-02-21

## 2024-02-21 NOTE — PROGRESS NOTES
Virtual/Telephone Check-In    Zenobia Mccoy  verbally consents to a Virtual/Telephone Check-In service on 2/21/2024 .  Patient understands and accepts financial responsibility for any deductible, co-insurance and/or co-pays associated with this service.    This was a video visit with audio and visual connection via Internet connection call with the patient     Chief Complaint   Patient presents with    Diabetes    Medication Follow-Up       Medications allergies and chart reviewed  COVID-19 protocol    HPI:   Zenobia Mccoy is a 43 year old female who schedules a virtual visit today for diabetes follow up.    Current DM Medications: Metformin 750 mg, Trulicity  Medication Side Effects: none. Has lost 7-8 lbs  ACE/ARB: None  Statin: Atorvastatin  Last Dilated Eye Exam: 4/2023  Last Diabetic Foot Exam: never    Other new issues or concerns today:   would like to increase Trulicity  Will be losing insurance 3/1/24    Last A1c value was 6.6% done 2/19/2024.     Recent Results (from the past 4380 hour(s))   Comp Metabolic Panel (14)    Collection Time: 02/19/24 10:00 AM   Result Value Ref Range    Glucose 126 (H) 70 - 99 mg/dL    Sodium 138 136 - 145 mmol/L    Potassium 4.6 3.5 - 5.1 mmol/L    Chloride 111 98 - 112 mmol/L    CO2 25.0 21.0 - 32.0 mmol/L    Anion Gap 2 0 - 18 mmol/L    BUN 22 9 - 23 mg/dL    Creatinine 1.05 (H) 0.55 - 1.02 mg/dL    Calcium, Total 9.4 8.5 - 10.1 mg/dL    Calculated Osmolality 291 275 - 295 mOsm/kg    eGFR-Cr 68 >=60 mL/min/1.73m2    AST 20 15 - 37 U/L    ALT 50 13 - 56 U/L    Alkaline Phosphatase 50 37 - 98 U/L    Bilirubin, Total 2.0 0.1 - 2.0 mg/dL    Total Protein 7.6 6.4 - 8.2 g/dL    Albumin 4.0 3.4 - 5.0 g/dL    Globulin  3.6 2.8 - 4.4 g/dL    A/G Ratio 1.1 1.0 - 2.0    Patient Fasting for CMP? Yes      Comment: patient is aware and wants all of them done      Cholesterol: 137, done on 2/19/2024.  HDL Cholesterol: 45, done on 2/19/2024.  LDL Cholesterol: 79, done on  2024.  TriGlycerides 64, done on 2024.     Allergies:  No Known Allergies    Current Outpatient Medications   Medication Sig Dispense Refill    Dulaglutide (TRULICITY) 1.5 MG/0.5ML Subcutaneous Solution Pen-injector Inject 1.5 mg into the skin once a week. 2 mL 0    albuterol 108 (90 Base) MCG/ACT Inhalation Aero Soln Inhale 2 puffs into the lungs every 4 (four) hours as needed (coughing spells). 1 each 0    levothyroxine 50 MCG Oral Tab Take 1 tablet (50 mcg total) by mouth before breakfast. 90 tablet 0    METFORMIN  MG Oral Tablet 24 Hr TAKE ONE TABLET BY MOUTH ONE TIME DAILY 90 tablet 0    ATORVASTATIN 10 MG Oral Tab Take 1 tablet by mouth nightly. 90 tablet 0    celecoxib (CELEBREX) 200 MG Oral Cap Take 1 capsule (200 mg total) by mouth daily with food. 30 capsule 0    Meloxicam 15 MG Oral Tab Take 1 tablet (15 mg total) by mouth daily with breakfast. 30 tablet 0      Past Medical History:   Diagnosis Date    Diabetes (HCC)     Diabetes mellitus (HCC)       Past Surgical History:   Procedure Laterality Date      13    TUBAL LIGATION        Family History   Problem Relation Age of Onset    Heart Disorder Father     Other (Hypothyroidism) Father     Diabetes Mother     Cancer Mother 67        lymphoma    No Known Problems Daughter     No Known Problems Son     Diabetes Maternal Grandmother     Heart Disorder Maternal Grandmother     Diabetes Maternal Grandfather     Heart Disorder Maternal Grandfather     Diabetes Paternal Grandmother     Heart Disorder Paternal Grandmother     Diabetes Paternal Grandfather     Heart Disorder Paternal Grandfather       Social History     Socioeconomic History    Marital status:    Tobacco Use    Smoking status: Never    Smokeless tobacco: Never   Vaping Use    Vaping Use: Never used   Substance and Sexual Activity    Alcohol use: Not Currently     Comment: Rarely    Drug use: No   Other Topics Concern    Caffeine Concern No     Comment:  coffee ocassionally    Exercise No    Seat Belt Yes   Social History Narrative    ** Merged History Encounter **              REVIEW OF SYSTEMS:   GENERAL: denies fever, chills, body aches, unusual weight gain  LUNGS: denies cough  CARDIOVASCULAR: denies chest pain, palpitations  GI: denies abdominal pain, nausea, vomiting, change in bowel movements  NEURO: denies headaches, dizziness, LOC    EXAM:   VITALS: not available as this is a telemed visit    GENERAL: Does not appear to be in acute distress  LUNG: No dyspnea with conversation  rest of physical exam unable to perform as this is a telemed visit    ASSESSMENT AND PLAN:       ICD-10-CM    1. Diabetes mellitus type 2 in obese (HCC)  E11.69     E66.9       2. Type 2 diabetes mellitus with hypercholesterolemia (HCC)  E11.69     E78.00       3. Obesity (BMI 30-39.9)  E66.9       4. Hypothyroidism, unspecified type  E03.9            Reviewed recent lab work and discussed plan  Increase Trulicity to 3 mg  Will continue for next 3 months while she gets her insurance figured out.   Plan to repeat labs in May  The patient indicates understanding of these issues and agrees to the plan.    Patient had an opportunity to ask questions and they were answered to her satisfaction.    \"Please note that this visit was completed using two-way, real-time interactive audio and/or video communication.  This has been done in good kenna to provide continuity of care in the best interest of the provider-patient relationship, due to the ongoing public health crisis/national emergency and because of restrictions of visitation.  There are limitations of this visit as no physical exam could be performed.  Every conscious effort was taken to allow for sufficient and adequate time.  This billing was spent on reviewing labs, medications, radiology tests and decision making.  Appropriate medical decision-making and tests are ordered as detailed in the plan of care above.\"      Maureen Leyva  APRN

## 2024-02-22 RX ORDER — PHENTERMINE HYDROCHLORIDE 37.5 MG/1
37.5 TABLET ORAL
Qty: 30 TABLET | Refills: 0 | Status: SHIPPED
Start: 2024-02-22 | End: 2024-03-23

## 2024-02-24 LAB
.: NORMAL
.: NORMAL

## 2024-03-12 ENCOUNTER — APPOINTMENT (OUTPATIENT)
Dept: DERMATOLOGY | Age: 44
End: 2024-03-12

## 2024-03-12 DIAGNOSIS — D18.01 CHERRY ANGIOMA: Primary | ICD-10-CM

## 2024-03-12 DIAGNOSIS — Z12.83 SCREENING EXAM FOR SKIN CANCER: ICD-10-CM

## 2024-03-12 DIAGNOSIS — L72.0 EIC (EPIDERMAL INCLUSION CYST): ICD-10-CM

## 2024-03-12 PROCEDURE — 99203 OFFICE O/P NEW LOW 30 MIN: CPT | Performed by: DERMATOLOGY

## 2024-03-13 ENCOUNTER — TELEPHONE (OUTPATIENT)
Dept: PHYSICAL THERAPY | Age: 44
End: 2024-03-13

## 2024-03-15 ENCOUNTER — OFFICE VISIT (OUTPATIENT)
Dept: PHYSICAL THERAPY | Age: 44
End: 2024-03-15
Attending: PHYSICIAN ASSISTANT
Payer: MEDICAID

## 2024-03-15 PROCEDURE — 97112 NEUROMUSCULAR REEDUCATION: CPT

## 2024-03-15 PROCEDURE — 97110 THERAPEUTIC EXERCISES: CPT

## 2024-03-15 NOTE — PROGRESS NOTES
Insurance (Authorized # of Visits):  [facility] 7/9 visits, expiration date 4/1/2024        Authorizing Physician: Dr. Arshad  Next MD visit: none scheduled    Fall Risk: standard         Precautions: n/a             Diagnosis:   Greater trochanteric bursitis of right hip (M70.61)  Gluteal tendinitis of right buttock (M76.01)        Referring Provider: Jeancarlos   Date of Evaluation:    2/1/2024  Precautions:  None  Next MD visit:   none scheduled  Date of Surgery: n/a      Subjective:   Did home program. Worse about a week ago, lifting a lot of boxes, caused more pain     Zenobia Mccoy is a 43 year old female who presents to therapy today with complaints of right gluteal pain  Pt describes pain level at worst 6/10.      Current functional limitations include after sitting a while and walking, or prolonged standing. No longer pain sleeping or with housework      Variable     Describes obstruction      Objective:   (Pain with *)  Tested 3/15/2024  Special tests:   Walking*    Lumbar AROM: (* denotes performed with pain)  Sidebending: R min*; L min/nil (18.5 inches and L side glide 1 finger, R side glide 3 fingers)     Hip AROM: (* denotes performed with pain)  Extension with extension rotation*: min, L intact      Tested 2/19/2024:  Palpation: tender R low back*        Tested 2/1/2024:  Today’s Treatment and Response:   R side glide in doorway x10 (hips to the L): produce, after worse walking now more painful     Flexion/rotation knees to the R therapist forces dynamic x20 mid-range with 60 degrees hip flexion: better, pain in walking abolished, no change to side glide, patient forces 30': no better    Flexion/rotation knees to the L therapist forces dynamic x20 mid-range with 60 degrees hip flexion: no effect     Hip external rotation sitting 10x2: a bit better 1st set, no effect 2nd set     Hip internal rotation supine* at about 60 degrees hip flexion: produce intense pain and much worse after x1*     R thoracic  rotation x10x3: worse 3rd set in doorframe for lateral overpressure (perhaps just more sore)         Assessment: Given trial of side glide stretching    Goals: (to be met in 12 visits)   Pt will improve ROSIO score from 28/100 to 14/100 to display improved ability to walking, sleeping and housework MET  Pt will reduce pain at its worst from 9/10 to 6/10 to allow for improved ability to walking, sleeping and housework MET  Pt will improve hip internal rotation MMT from 3-/5 to 3+/5 allow for improved ability to walking, sleeping and housework MET  Pt will be independent with home program to allow for maintenance of goals achieved in therapy.  (95% progress 2/19/2024)    New Goals 2/12/2012:  Pt will improve ROSIO score from 2/100 to 0/100 to display improved ability to walking, sleeping and housework   Pt will reduce pain at its worst from 2/10 to 0/10 to allow for improved ability to walking, sleeping and housework  Pt will improve improve side glide ROM from min loss with pain to free and full for improved ability to walking, sleeping and housework    Post Oswestry Disability Index Score  Post Score: 2 % (2/12/2024 12:05 PM)    26 % improvement    Plan:  (Note, request extension of INS, expires 4/1)    Exhaust L side glide (hips to the R), patient forces first  -Extension in standing, can she do with fulcrum/strap overpressure? L5 vs L3)    Maybe alternative lateral:  Prone hips to the R    Maybe:  Continue flexion/rotation knees to the right  -If she needs this, manual therapy here to reduce lateral component    Never done:  Flexion in step standing (either leg)    (Get her to do this now)  Flexion in standing        Hip movements:  Hip external rotation stretching sitting on ground  Hip extension  Hip flexion    Dry needling, ask on this    Manual therapy:  Extension/rotation mobilization: worse after    Strength/flexibility:  gluteal stretching/strengthening exercises as well as iliotibial band  stretches    Worse:  Straight leg raise  Quadruped hip extension    Produce, no worse (clams on R side)      Charges: 2 there ex, 1 neuro re-ed   Total Timed Treatment: 38 min  Total Treatment Time: 38 min  Date: 2/2/2024  Tx#: 2 Date: 2/5/2024  Tx#: 3 Date: 2/7/2024  Tx#: 4 Date: 2/12/2024  Tx#: 5 Date: 2/19/2024  Tx#: 6 3/15/2024  Tx#: 7    Ther-Ex 23 minutes    Prone press up x10: worse, more pain standing    L rotation thoracic x10: worse less motion side glide    Prone hips to the L 5-10x3: better    Educated on home program, see below.  Verbal, visual and tactile cues for there ex.     Ther-Ex 30 minutes  Prone press up hips to the L x15: patient forces no external force for lateral force. X2: better    Slouch-overcorrection x10: better x2    Straight leg raise x10: worse    Clams x10: better    L rotation thoracic x10: no effect (has continued to do some of this exercise)    Educated on home program, see below.  Verbal, visual and tactile cues for there ex. Ther-Ex 30 minutes  Prone press up hips to the L x15 x2: a bit better    Prone press up x10x2 with overpressure (worse after 2nd set after mobilization)    Flexion/rotation knees to the R 1-3 minutes x3: much better    Prone press up x10: maybe better    IT band stretch standing with side bending x30'x3    Bridge x10:    Slouch-overcorrection x10x    Clams x3:     Educated on home program, see below.  Verbal, visual and tactile cues for there ex. Ther-Ex 30 minutes  Flexion/rotation knees to the R 1-3 minutes x3: no better, patient forces    Prone press up x10 x2 with strap: better (3 more sets with therapist overpressure)    Quad stretch on step 30 seconds x2:    Quadruped hip extension x10: worse      Educated on home program, see below.  Verbal, visual and tactile cues for there ex. Ther-Ex 38 minutes  Sustained lumbar extension prone 3 minutes: a bit better    L3 mobilization/overpressure prone press up x10    Flexion/rotation knees to the R 1-3  minutes      Flexion in lying x10x2:    Step up 9\" x10x    Hamstring bridge x10    Bridge one leg straight 10x    IT band stretch standing with side bending 30'x      Educated on home program, see below.  Verbal, visual and tactile cues for there ex. There ex 30 minutes:     L3 extension overpressure/mobilization x10: better    Sustained lumbar extension 3 minutes: after better    side glide R (hips to the L) x10-20x3: better    Extension in standing 10x3:     Educated on home program, see below.  Verbal, visual and tactile cues for there ex.      Manual 15 minutes:  Extension/rotation mobilization on back (most painful spot is L5/S1 on R): after worse   Manual 8 minutes:  Extension/rotation mobilization on back (most painful spot is L5/S1 on R): after more sore, but no no loss of motion    Lateral overpressure to hips to the L Manual 8 minutes:  Extension/rotation mobilization on back (most painful spot is L5/S1 on R): after more sore, but no no loss of motion    Lateral overpressure to hips to the L Manual 8 minutes:  Extension mobilization lumbar spine L5 and L3 and L1 as well as overpressure: more better at L3, maybe a bit better at L1                N Re-Ed       Neuro re-education 8 minutes:    Discuss progress, pathology and plan of care.

## 2024-03-20 ENCOUNTER — OFFICE VISIT (OUTPATIENT)
Dept: PHYSICAL THERAPY | Age: 44
End: 2024-03-20
Attending: PHYSICIAN ASSISTANT
Payer: MEDICAID

## 2024-03-20 PROCEDURE — 97112 NEUROMUSCULAR REEDUCATION: CPT

## 2024-03-20 PROCEDURE — 97110 THERAPEUTIC EXERCISES: CPT

## 2024-03-20 NOTE — PROGRESS NOTES
Insurance (Authorized # of Visits):  [facility] 8/9 visits, expiration date 4/1/2024        Authorizing Physician: Dr. Arshad  Next MD visit: none scheduled    Fall Risk: standard         Precautions: n/a             Diagnosis:   Greater trochanteric bursitis of right hip (M70.61)  Gluteal tendinitis of right buttock (M76.01)        Referring Provider: Jeancarlos   Date of Evaluation:    2/1/2024  Precautions:  None  Next MD visit:   none scheduled  Date of Surgery: n/a      Subjective:  Very limited home program, about the same.    Zenobia Mccoy is a 43 year old female who presents to therapy today with complaints of right gluteal pain  Pt describes pain level at worst 6/10.      Current functional limitations include after sitting a while and walking, or prolonged standing. No longer pain sleeping or with housework      Variable     Describes obstruction      Objective:   (Pain with *)  Tested 3/15/2024  Special tests:   Walking*  Supine core strength with one leg in flexion 90 degrees and then back to neutral* (R hip flexion)    Lumbar AROM: (* denotes performed with pain)  Sidebending: R min*; L min/nil (18.5 inches and L side glide 1 finger, R side glide 3 fingers)       Tested 2/19/2024:  Palpation: tender R low back*        Tested 2/1/2024:  Today’s Treatment and Response:   R side glide in doorway x10 (hips to the L): produce, after worse walking now more painful     Flexion/rotation knees to the R therapist forces dynamic x20 mid-range with 60 degrees hip flexion: better, pain in walking abolished, no change to side glide, patient forces 30': no better    Flexion/rotation knees to the L therapist forces dynamic x20 mid-range with 60 degrees hip flexion: no effect     Hip external rotation sitting 10x2: a bit better 1st set, no effect 2nd set     Hip internal rotation supine* at about 60 degrees hip flexion: produce intense pain and much worse after x1*     R thoracic rotation x10x3: worse 3rd set in  doorframe for lateral overpressure (perhaps just more sore)         Assessment: Given trial of side glide stretching    Goals: (to be met in 12 visits)   Pt will improve ROSIO score from 28/100 to 14/100 to display improved ability to walking, sleeping and housework MET  Pt will reduce pain at its worst from 9/10 to 6/10 to allow for improved ability to walking, sleeping and housework MET  Pt will improve hip internal rotation MMT from 3-/5 to 3+/5 allow for improved ability to walking, sleeping and housework MET  Pt will be independent with home program to allow for maintenance of goals achieved in therapy.  (95% progress 2/19/2024)    New Goals 2/12/2012:  Pt will improve ROSIO score from 2/100 to 0/100 to display improved ability to walking, sleeping and housework   Pt will reduce pain at its worst from 2/10 to 0/10 to allow for improved ability to walking, sleeping and housework  Pt will improve improve side glide ROM from min loss with pain to free and full for improved ability to walking, sleeping and housework    Post Oswestry Disability Index Score  Post Score: 2 % (2/12/2024 12:05 PM)    26 % improvement    Plan:  Make plan for future visits (is next visit the last, or do more?)      Exhaust L side glide (hips to the R), patient forces first in doorway  -Extension in standing, can she do with fulcrum/strap overpressure? L5 vs L3)    L Shift correction (hips to the R)  Compare to mobilization (flexion/rotation knees to the right prone mobilization and then overpressure)    Never done:  Flexion in step standing L foot on step    (Get her to do this now)  Flexion in standing        Hip movements:  Hip external rotation stretching sitting on ground  Hip extension  Hip flexion    Dry needling, ask on this    Manual therapy:  Extension/rotation mobilization: worse after    Strength/flexibility:  gluteal stretching/strengthening exercises as well as iliotibial band stretches    Worse:  Straight leg raise  Quadruped  hip extension    Produce, no worse (clams on R side)      Charges: 2 there ex, 1 neuro re-ed   Total Timed Treatment: 38 min  Total Treatment Time: 38 min  Date: 2/12/2024  Tx#: 5 Date: 2/19/2024  Tx#: 6 3/15/2024  Tx#: 7 3/20/2024  Tx# 8     Ther-Ex 30 minutes  Flexion/rotation knees to the R 1-3 minutes x3: no better, patient forces    Prone press up x10 x2 with strap: better (3 more sets with therapist overpressure)    Quad stretch on step 30 seconds x2:    Quadruped hip extension x10: worse      Educated on home program, see below.  Verbal, visual and tactile cues for there ex. Ther-Ex 38 minutes  Sustained lumbar extension prone 3 minutes: a bit better    L3 mobilization/overpressure prone press up x10    Flexion/rotation knees to the R 1-3 minutes      Flexion in lying x10x2:    Step up 9\" x10x    Hamstring bridge x10    Bridge one leg straight 10x    IT band stretch standing with side bending 30'x      Educated on home program, see below.  Verbal, visual and tactile cues for there ex. There ex 30 minutes:     L3 extension overpressure/mobilization x10: better    Sustained lumbar extension 3 minutes: after better    side glide R (hips to the L) x10-20x3: better    Extension in standing 10x3:     Educated on home program, see below.  Verbal, visual and tactile cues for there ex.   There ex 30 minutes:   Prone press up x10: worse (done after side glide stretching)    L thoracic rotation x20 overpressure: a bit better    R flexion in step stanidng x20: no better    Prone press up hips to the R or L x10 each, tested individually, no better    side glide R (hips to the L) x10-20x3: no effect patient forces, Shift corrrection: more better (than thoracic L rotation)    Extension in standing 10x3: no worse    Attempted supine core strength for hip flexion x10: produce pain, worse after    Plank 30'x2 on toes, front    Educated on home program, see below.  Verbal, visual and tactile cues for there ex.     Manual 8  minutes:  Extension mobilization lumbar spine L5 and L3 and L1 as well as overpressure: more better at L3, maybe a bit better at L1                    Neuro re-education 8 minutes:    Discuss progress, pathology and plan of care.  Neuro re-education 8 minutes:    Discuss progress, pathology and plan of care.

## 2024-03-22 ENCOUNTER — APPOINTMENT (OUTPATIENT)
Dept: PHYSICAL THERAPY | Age: 44
End: 2024-03-22
Attending: PHYSICIAN ASSISTANT
Payer: MEDICAID

## 2024-03-22 ENCOUNTER — TELEPHONE (OUTPATIENT)
Dept: PHYSICAL THERAPY | Age: 44
End: 2024-03-22

## 2024-03-25 ENCOUNTER — TELEPHONE (OUTPATIENT)
Dept: PHYSICAL THERAPY | Age: 44
End: 2024-03-25

## 2024-03-27 ENCOUNTER — OFFICE VISIT (OUTPATIENT)
Dept: PHYSICAL THERAPY | Age: 44
End: 2024-03-27
Attending: PHYSICIAN ASSISTANT
Payer: MEDICAID

## 2024-03-27 PROCEDURE — 97140 MANUAL THERAPY 1/> REGIONS: CPT

## 2024-03-27 PROCEDURE — 97112 NEUROMUSCULAR REEDUCATION: CPT

## 2024-03-27 PROCEDURE — 97110 THERAPEUTIC EXERCISES: CPT

## 2024-03-27 NOTE — PROGRESS NOTES
Insurance (Authorized # of Visits):  Medicaid    Authorizing Physician: Dr. Arshad  Next MD visit: none scheduled    Fall Risk: standard         Precautions: n/a             Diagnosis:   Greater trochanteric bursitis of right hip (M70.61)  Gluteal tendinitis of right buttock (M76.01)        Referring Provider: Jeancarlos   Date of Evaluation:    2/1/2024  Precautions:  None  Next MD visit:   none scheduled  Date of Surgery: n/a      Subjective:  Very limited home program, about the same.    Zenobia Mccoy is a 43 year old female who presents to therapy today with complaints of right gluteal pain  Pt describes pain level at worst 6/10.      Current functional limitations include after sitting a while and walking, or prolonged standing. No longer pain sleeping or with housework      Variable     Describes obstruction      Objective:   (Pain with *)  Tested 3/27/2024:  Special tests:   Standing*    Walking*    Lumbar AROM: (* denotes performed with pain)  Sidebending: R min*; L min* (18.5 inches and L side glide 1 finger, R side glide 3 fingers) (measured 3/15/2024)      Tested 3/15/2024:  Supine core strength with one leg in flexion 90 degrees and then back to neutral* (R hip flexion)      Tested 2/19/2024:  Palpation: tender R low back*        Tested 2/1/2024:  Today’s Treatment and Response:   R side glide in doorway x10 (hips to the L): produce, after worse walking now more painful     Flexion/rotation knees to the R therapist forces dynamic x20 mid-range with 60 degrees hip flexion: better, pain in walking abolished, no change to side glide, patient forces 30': no better    Flexion/rotation knees to the L therapist forces dynamic x20 mid-range with 60 degrees hip flexion: no effect     Hip external rotation sitting 10x2: a bit better 1st set, no effect 2nd set     Hip internal rotation supine* at about 60 degrees hip flexion: produce intense pain and much worse after x1*     R thoracic rotation x10x3: worse 3rd  set in doorframe for lateral overpressure (perhaps just more sore)         Assessment: Improved with flexion/rotation knees to the R and further improved with press ups.    Goals: (to be met in 12 visits)   Pt will improve ROSIO score from 28/100 to 14/100 to display improved ability to walking, sleeping and housework MET  Pt will reduce pain at its worst from 9/10 to 6/10 to allow for improved ability to walking, sleeping and housework MET  Pt will improve hip internal rotation MMT from 3-/5 to 3+/5 allow for improved ability to walking, sleeping and housework MET  Pt will be independent with home program to allow for maintenance of goals achieved in therapy.  (95% progress 2/19/2024)    New Goals 2/12/2012:  Pt will improve ROSIO score from 2/100 to 0/100 to display improved ability to walking, sleeping and housework   Pt will reduce pain at its worst from 2/10 to 0/10 to allow for improved ability to walking, sleeping and housework  Pt will improve improve side glide ROM from min loss with pain to free and full for improved ability to walking, sleeping and housework    Post Oswestry Disability Index Score  Post Score: 2 % (2/12/2024 12:05 PM)    26 % improvement    Plan:  Make plan for future visits (is next visit the last, or do more?)    Flexion/rotation knees to the R, patient forces,   -see effect then prone press (progress these),   (Get her on ideal home program)      Never done:  Flexion in step standing L foot on step      Very unlikely  (Get her to do this now)  Flexion in standing        Hip movements:  Hip external rotation stretching sitting on ground  Hip extension  Hip flexion    Dry needling, ask on this    Manual therapy:  Extension/rotation mobilization: worse after    Strength/flexibility:  gluteal stretching/strengthening exercises as well as iliotibial band stretches    Worse:  Straight leg raise  Quadruped hip extension    Produce, no worse (clams on R side)      Charges: 1 there ex, 1 neuro  re-ed, 1 manual therapy   Total Timed Treatment: 38 min  Total Treatment Time: 38 min  3/15/2024  Tx#: 7 3/20/2024  Tx# 8 3/27/2024  Tx#: 9    There ex 30 minutes:     L3 extension overpressure/mobilization x10: better    Sustained lumbar extension 3 minutes: after better    side glide R (hips to the L) x10-20x3: better    Extension in standing 10x3:     Educated on home program, see below.  Verbal, visual and tactile cues for there ex.   There ex 30 minutes:   Prone press up x10: worse (done after side glide stretching)    L thoracic rotation x20 overpressure: a bit better    R flexion in step stanidng x20: no better    Prone press up hips to the R or L x10 each, tested individually, no better    side glide R (hips to the L) x10-20x3: no effect patient forces, Shift corrrection: more better (than thoracic L rotation)    Extension in standing 10x3: no worse    Attempted supine core strength for hip flexion x10: produce pain, worse after    Plank 30'x2 on toes, front    Educated on home program, see below.  Verbal, visual and tactile cues for there ex. There ex 8 minutes:   Flexion/rotation knees to the R 3 minutes x2    Prone press up x10: a bit better    Educated on home program, see below.  Verbal, visual and tactile cues for there ex.              Manual therapy 22 minutes:  L Shift correction (hips to the R) x10: produce, no worse    Extension/rotation mobilization grade 3 (both sides done)    Flexion/rotation with overpressure    Neuro re-education 8 minutes:    Discuss progress, pathology and plan of care.  Neuro re-education 8 minutes:    Discuss progress, pathology and plan of care.  Neuro re-education 8 minutes:    Discuss progress, pathology and plan of care.

## 2024-03-29 ENCOUNTER — OFFICE VISIT (OUTPATIENT)
Dept: PHYSICAL THERAPY | Age: 44
End: 2024-03-29
Attending: PHYSICIAN ASSISTANT
Payer: MEDICAID

## 2024-03-29 PROCEDURE — 97140 MANUAL THERAPY 1/> REGIONS: CPT

## 2024-03-29 PROCEDURE — 97112 NEUROMUSCULAR REEDUCATION: CPT

## 2024-03-29 PROCEDURE — 97110 THERAPEUTIC EXERCISES: CPT

## 2024-03-29 NOTE — PROGRESS NOTES
Insurance (Authorized # of Visits):  Medicaid    Authorizing Physician: Dr. Arshad  Next MD visit: none scheduled    Fall Risk: standard         Precautions: n/a             Diagnosis:   Greater trochanteric bursitis of right hip (M70.61)  Gluteal tendinitis of right buttock (M76.01)        Referring Provider: Jeancarlos   Date of Evaluation:    2/1/2024  Precautions:  None  Next MD visit:   none scheduled  Date of Surgery: n/a      Subjective  Limited home program, worse. Called Physiatrist, would like to get an injection, appointment April 19.  Pain went down leg with plank.    Zenobia Mccoy is a 43 year old female who presents to therapy today with complaints of right gluteal pain  Pt describes pain level at worst 6/10.      Current functional limitations include after sitting a while and walking, or prolonged standing. No longer pain sleeping or with housework      Variable     Describes obstruction      Objective:   (Pain with *)  Tested 3/29/2024:  Special tests:   Standing*    Walking*    Lumbar AROM: (* denotes performed with pain)  Sidebending: R min*; L min* (18.5 inches and L side glide 1 finger, R side glide 3 fingers) (measured 3/15/2024)      Tested 3/15/2024:  Supine core strength with one leg in flexion 90 degrees and then back to neutral* (R hip flexion)      Tested 2/19/2024:  Palpation: tender R low back*        Tested 2/1/2024:  Today’s Treatment and Response:   R side glide in doorway x10 (hips to the L): produce, after worse walking now more painful     Flexion/rotation knees to the R therapist forces dynamic x20 mid-range with 60 degrees hip flexion: better, pain in walking abolished, no change to side glide, patient forces 30': no better    Flexion/rotation knees to the L therapist forces dynamic x20 mid-range with 60 degrees hip flexion: no effect     Hip external rotation sitting 10x2: a bit better 1st set, no effect 2nd set     Hip internal rotation supine* at about 60 degrees hip  flexion: produce intense pain and much worse after x1*     R thoracic rotation x10x3: worse 3rd set in doorframe for lateral overpressure (perhaps just more sore)         Assessment: Improved with flexion/rotation knees to the R and further improved with press ups. Needs manual therapy to improve further.    Goals: (to be met in 12 visits)   Pt will improve ROSIO score from 28/100 to 14/100 to display improved ability to walking, sleeping and housework MET  Pt will reduce pain at its worst from 9/10 to 6/10 to allow for improved ability to walking, sleeping and housework MET  Pt will improve hip internal rotation MMT from 3-/5 to 3+/5 allow for improved ability to walking, sleeping and housework MET  Pt will be independent with home program to allow for maintenance of goals achieved in therapy.  (95% progress 2/19/2024)    New Goals 2/12/2012:  Pt will improve ROSIO score from 2/100 to 0/100 to display improved ability to walking, sleeping and housework   Pt will reduce pain at its worst from 2/10 to 0/10 to allow for improved ability to walking, sleeping and housework  Pt will improve improve side glide ROM from min loss with pain to free and full for improved ability to walking, sleeping and housework    Post Oswestry Disability Index Score  Post Score: 2 % (2/12/2024 12:05 PM)    26 % improvement    Plan:  Make plan for future visits (is next visit the last, or do more?)    Flexion/rotation knees to the R, patient forces,   -see effect then prone press (progress these),   (Get her on ideal home program)      Never done:  Flexion in step standing L foot on step      Very unlikely  (Get her to do this now)  Flexion in standing        Hip movements:  Hip external rotation stretching sitting on ground  Hip extension  Hip flexion    Dry needling, ask on this    Manual therapy:  Extension/rotation mobilization: worse after    Strength/flexibility:  gluteal stretching/strengthening exercises as well as iliotibial band  stretches    Worse:  Straight leg raise  Quadruped hip extension    Produce, no worse (clams on R side)      Charges: 1 there ex, 1 neuro re-ed, 1 manual therapy   Total Timed Treatment: 38 min  Total Treatment Time: 38 min  3/15/2024  Tx#: 7 3/20/2024  Tx# 8 3/27/2024  Tx#: 9 3/29/2024  Tx#: 10    There ex 30 minutes:     L3 extension overpressure/mobilization x10: better    Sustained lumbar extension 3 minutes: after better    side glide R (hips to the L) x10-20x3: better    Extension in standing 10x3:     Educated on home program, see below.  Verbal, visual and tactile cues for there ex.   There ex 30 minutes:   Prone press up x10: worse (done after side glide stretching)    L thoracic rotation x20 overpressure: a bit better    R flexion in step stanidng x20: no better    Prone press up hips to the R or L x10 each, tested individually, no better    side glide R (hips to the L) x10-20x3: no effect patient forces, Shift corrrection: more better (than thoracic L rotation)    Extension in standing 10x3: no worse    Attempted supine core strength for hip flexion x10: produce pain, worse after    Plank 30'x2 on toes, front    Educated on home program, see below.  Verbal, visual and tactile cues for there ex. There ex 8 minutes:   Flexion/rotation knees to the R 3 minutes x2    Prone press up x10: a bit better    Educated on home program, see below.  Verbal, visual and tactile cues for there ex.   There ex 8 minutes:   Flexion/rotation knees to the R 3 minutes x2: a bit better with knees together, no effect knees apart (one leg straight)    Prone press up x10: maybe bit better    Educated on home program, see below.  Verbal, visual and tactile cues for there ex.            Manual therapy 22 minutes:  L Shift correction (hips to the R) x10: produce, no worse    Extension/rotation mobilization grade 3 (both sides done)    Flexion/rotation with overpressure Manual therapy 22 minutes:      Extension/rotation mobilization  grade 3 (both sides done)    Flexion/rotation with overpressure    Neuro re-education 8 minutes:    Discuss progress, pathology and plan of care.  Neuro re-education 8 minutes:    Discuss progress, pathology and plan of care.  Neuro re-education 8 minutes:    Discuss progress, pathology and plan of care.  Neuro re-education 8 minutes:    Discuss progress, pathology and plan of care.

## 2024-04-08 ENCOUNTER — TELEPHONE (OUTPATIENT)
Dept: PHYSICAL THERAPY | Age: 44
End: 2024-04-08

## 2024-04-08 NOTE — TELEPHONE ENCOUNTER
Said home program not working. Canceled appointment with at least 4 days notice (will see Dr. Coburn in about 10 days)

## 2024-04-12 ENCOUNTER — APPOINTMENT (OUTPATIENT)
Dept: PHYSICAL THERAPY | Age: 44
End: 2024-04-12
Attending: PHYSICIAN ASSISTANT

## 2024-04-19 ENCOUNTER — OFFICE VISIT (OUTPATIENT)
Dept: PHYSICAL MEDICINE AND REHAB | Facility: CLINIC | Age: 44
End: 2024-04-19
Payer: MEDICAID

## 2024-04-19 VITALS — RESPIRATION RATE: 18 BRPM | HEIGHT: 60 IN | BODY MASS INDEX: 33.96 KG/M2 | WEIGHT: 173 LBS

## 2024-04-19 DIAGNOSIS — R10.31 RIGHT GROIN PAIN: Primary | ICD-10-CM

## 2024-04-19 PROCEDURE — 99204 OFFICE O/P NEW MOD 45 MIN: CPT | Performed by: PHYSICAL MEDICINE & REHABILITATION

## 2024-04-19 RX ORDER — DICLOFENAC SODIUM 75 MG/1
TABLET, DELAYED RELEASE ORAL
Qty: 40 TABLET | Refills: 0 | Status: SHIPPED | OUTPATIENT
Start: 2024-04-19

## 2024-04-19 NOTE — H&P
History and Physical    C/C:   Chief Complaint   Patient presents with    New Patient     New R handed patient Rfd by physical therapy for R hip pain.  Onset + 2 yrs. Denies injury or trigger. Since then has been bothersome.   XR April 2023 and May, 2023. Negative for fracture. Has tried medications and PT. Continues HEP but finds this very painful. + low back pain. Radiation into hip and to buttocks. Denies N/T. Describes as \"throbbing\". CPL 8/10. Using tylenol     HPI: 43-year-old female presents with chronic right groin, lateral hip, buttock, and anterior thigh pain that worsens with standing and walking, going up a flight of stairs.  Has been persistent despite physical therapy, Celebrex, and then meloxicam.  She has had x-rays of the right hip that appear normal.  No previous hip disorders.  Also feels mild right lower back pain.  No associated numbness or tingling.    Allergies: No Known Allergies      Physical exam:  Resp 18   Ht 60\"   Wt 173 lb (78.5 kg)   LMP 01/30/2024 (Within Days)   BMI 33.79 kg/m²     Right hip exam:    Hip flexion: 120 degrees, right hip pain  Hip adduction: 10 degrees  Hip abduction: 30 degrees  Pain with internal rotation of the right hip. No pain with external rotation.   Hip scour: Positive  Stinchfield test: Negative  Straight leg raise: Negative    Assessment and plan:  Right hip joint pain    Persistent despite physical therapy and multiple NSAIDs, within normal x-ray.  She may have a labral disorder causing right hip joint pain. I recommend an MRI of the right hip without contrast.  In the meantime she will take diclofenac 75 mg twice daily for the next 7 to 10 days.  She should stop the medication if it gives her heartburn or stomach upset.    Follow-up to discuss imaging results.    Kar Coburn DO  Physical Medicine and Rehabilitation  [facility]

## 2024-04-26 ENCOUNTER — APPOINTMENT (OUTPATIENT)
Dept: PHYSICAL THERAPY | Age: 44
End: 2024-04-26
Attending: PHYSICIAN ASSISTANT

## 2024-04-29 ENCOUNTER — HOSPITAL ENCOUNTER (OUTPATIENT)
Dept: MRI IMAGING | Facility: HOSPITAL | Age: 44
Discharge: HOME | End: 2024-04-29
Attending: PHYSICAL MEDICINE & REHABILITATION
Payer: MEDICAID

## 2024-04-29 DIAGNOSIS — R10.31 RIGHT GROIN PAIN: ICD-10-CM

## 2024-04-29 PROCEDURE — 73721 MRI JNT OF LWR EXTRE W/O DYE: CPT | Performed by: PHYSICAL MEDICINE & REHABILITATION

## 2024-05-01 ENCOUNTER — TELEPHONE (OUTPATIENT)
Dept: PHYSICAL MEDICINE AND REHAB | Facility: CLINIC | Age: 44
End: 2024-05-01

## 2024-05-01 ENCOUNTER — OFFICE VISIT (OUTPATIENT)
Dept: PHYSICAL MEDICINE AND REHAB | Facility: CLINIC | Age: 44
End: 2024-05-01
Payer: MEDICAID

## 2024-05-01 VITALS — BODY MASS INDEX: 33.96 KG/M2 | WEIGHT: 173 LBS | RESPIRATION RATE: 18 BRPM | HEIGHT: 60 IN

## 2024-05-01 DIAGNOSIS — S73.191D TEAR OF RIGHT ACETABULAR LABRUM, SUBSEQUENT ENCOUNTER: Primary | ICD-10-CM

## 2024-05-01 PROCEDURE — 99214 OFFICE O/P EST MOD 30 MIN: CPT | Performed by: PHYSICAL MEDICINE & REHABILITATION

## 2024-05-01 PROCEDURE — 20611 DRAIN/INJ JOINT/BURSA W/US: CPT | Performed by: PHYSICAL MEDICINE & REHABILITATION

## 2024-05-01 RX ORDER — LIDOCAINE HYDROCHLORIDE 10 MG/ML
6 INJECTION, SOLUTION INFILTRATION; PERINEURAL ONCE
Status: COMPLETED | OUTPATIENT
Start: 2024-05-01 | End: 2024-05-01

## 2024-05-01 RX ORDER — TRIAMCINOLONE ACETONIDE 40 MG/ML
40 INJECTION, SUSPENSION INTRA-ARTICULAR; INTRAMUSCULAR ONCE
Status: COMPLETED | OUTPATIENT
Start: 2024-05-01 | End: 2024-05-01

## 2024-05-01 NOTE — TELEPHONE ENCOUNTER
Initiated authorization for Right hip joint steroid injection under [place] guidance CPT/HCPCS 41896,  with [employer]  Case #[id number]  Status: Approved-authorization is not required per health plan based on medical necessity however is not a guarantee of payment and may be subject to review once claim is submitted valid 5/1/24-5/31/24      Per Jesusita, Inj will be done in office

## 2024-05-01 NOTE — PROGRESS NOTES
Progress note    C/C:   Chief Complaint   Patient presents with    Follow - Up     Returning patient here for follow up. LOV 4/19/24. Here to review MRI. Reports symptoms mildly improved. Feels diclofenac has been helpful. Using BID. No new complaints. CPL 0/10.        HPI: 43-year-old female presents for follow-up.  Underwent an MRI of the right hip and returns to discuss results.  Diclofenac twice daily has been helping significantly, tried to take it once daily and had increase in primarily right groin and lateral thigh pain and some radiation into the thigh.  While lying flat for the MRI she was having radiating pain into the proximal aspect of the right lower leg.  Not able to walk down a flight of stairs normally due to increasing pain.  Took diclofenac 2 hours before today's appointment.    Pertinent allergies: No Known Allergies     Physical exam:  Resp 18   Ht 60\"   Wt 173 lb (78.5 kg)   LMP 01/30/2024 (Within Days)   BMI 33.79 kg/m²      No pain with lumbar flexion. + right buttock pain with lumbar extension  5/5 LE strength b/l  Hip flexion 120 degrees without pain  Limitation to passive internal or external range of motion, without provoked hip pain  Scouring of the right hip is mildly painful    Imaging: MRI right hip dated 4/29/24 independently reviewed; mild CAM deformity, acetabular labral tear. Report below:    FINDINGS:    OSSEOUS STRUCTURES:  Alpha angle is measured on the oblique axial fat saturation proton density sequence is 56ø.  There is small focal area of convexity at the femoral head neck junction anteriorly which could indicate cam type femoral acetabular  impingement morphology.  There is no fracture or stress reaction detected.  MUSCULATURE:  No acute strain, edema, or atrophy.    TENDONS:  Insertions about the greater and lesser trochanter are unremarkable.  Hamstring and adductor insertions are unremarkable.  No evidence of bursitis.  HIP JOINT:  There is tear with maceration of  the anterior superior labrum which is seen to best advantage on the coronal fat saturation proton density sequence images involving the anterior superior labrum.  There is no paralabral cyst.  Incidental note is made of the benign right adnexal cyst measuring 3.3 cm.  This is a physiologic observation in a patient this age and does not necessarily require additional evaluation.                      Impression   CONCLUSION:    1. 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  There is also linear tear with maceration anterior superior labrum.  There is no paralabral cyst or high-grade cartilage defect.  2. Benign right adnexal cyst is noted.  3. MRI of right hip is otherwise unremarkable.      Assessment and plan  Right CAM deformity, labral tear  ? Right lumbar radiculitis    Majority of her symptoms can be explained by hip labral tear with CAM deformity. Persistent despite physical therapy, NSAIDs. Recommend right hip joint steroid injection under US guidance for [place]/therapeutic purposes.  Done today, see separate note for details.  If no improvement within 1 week may need to consider MRI of the lumbar spine to rule out the lumbar spine disorder mimicking a hip disorder, and if MRI lumbar spine negative have her seen by orthopedic surgery.     Kar Coburn DO  Physical Medicine and Rehabilitation  [facility][1]

## 2024-05-01 NOTE — PATIENT INSTRUCTIONS
Hold the diclofenac until Friday IF you need it. If the hip joint injections works well and you have no significant right hip pain do not take the diclofenac. Please let me know how you are doing in 1 week.     Steroid Injection Information  What to expect: The injection contains Lidocaine (which numbs the area) and Kenalog (a steroid which decreases inflammation).  You may have pain relief within hours of the injection due to the Lidocaine.  The Kenalog can take a couple days, up to a couple weeks, to reach the full effect.  It is also possible to have a slight increase in symptoms over the first few days, but that should resolve fairly quickly.    How long will the injection last?: The length of response to an injection is variable.  Literally a couple weeks to a couple years.  The injection will decrease the inflammation and the pain will return if/when the inflammation returns.    Activity Recommendations: For the first 24 hours after injection, keep the area clean and dry.  It is ok to shower but don't soak in a tub during that time.  No vigorous activity such as running or heavy lifting for the first week but other than that you can gradually resume your normal activities immediately.  If you have a significant decrease in pain, be careful not to do too much too soon.  Again, the key is GRADUAL resumption of activites.    Things to look out for: Common injection side effects include soreness at the injection site, bruising, flushing of the face or skin, and a temporary increase in your blood sugars and/or blood pressure.  Infection is very rare but please notify my office ([facility] [phone]) if you develop any fevers, drainage from the injection site, or severe increase in pain.  If it is the weekend, go to an Emergency Room.     Physiatry   [phone]

## 2024-05-02 NOTE — PROCEDURES
Dx: right hip labral tear, femoroacetabular impingement syndrome  Procedure: right hip joint steroid injection under US guidance    The patient is here for a right hip injection done under ultrasound guidance.  Under ultrasound guidance the right femoral-acetabular joint was identified and a victoriano was placed on the patient's skin.The skin was cleaned with betadyne swabs x 3 and anesthetized with 1% lidocaine without epinephrine.  Then a 22 gauge needle was inserted under ultrasound guidance into the right femoral-acetabular joint.  Aspiration was performed and no blood, fluid, or air was aspirated.  The patient was then injected with 5 ml of 1 ml of 40 mg of Kenalog/ml and 4 ml of 1% lidocaine without epinephrine.  The needle was removed and a band aid was applied along with triple antibiotic ointment.  The patient will follow up in 1 week with a phone call.    These images are permanently stored in the ultrasound unit or PACS system.    Kar Coburn DO  Physical Medicine and Rehabilitation  [facility]

## 2024-05-03 ENCOUNTER — APPOINTMENT (OUTPATIENT)
Dept: PHYSICAL THERAPY | Age: 44
End: 2024-05-03
Attending: PHYSICIAN ASSISTANT

## 2024-05-06 ENCOUNTER — HOSPITAL ENCOUNTER (OUTPATIENT)
Dept: MAMMOGRAPHY | Age: 44
Discharge: HOME | End: 2024-05-06
Attending: OBSTETRICS & GYNECOLOGY
Payer: MEDICAID

## 2024-05-06 DIAGNOSIS — Z12.31 BREAST CANCER SCREENING BY MAMMOGRAM: ICD-10-CM

## 2024-05-06 PROCEDURE — 77063 BREAST TOMOSYNTHESIS BI: CPT | Performed by: OBSTETRICS & GYNECOLOGY

## 2024-05-06 PROCEDURE — 77067 SCR MAMMO BI INCL CAD: CPT | Performed by: OBSTETRICS & GYNECOLOGY

## 2024-05-31 ENCOUNTER — LAB ENCOUNTER (OUTPATIENT)
Dept: LAB | Age: 44
End: 2024-05-31
Attending: NURSE PRACTITIONER
Payer: MEDICAID

## 2024-05-31 DIAGNOSIS — E78.00 TYPE 2 DIABETES MELLITUS WITH HYPERCHOLESTEROLEMIA (HCC): ICD-10-CM

## 2024-05-31 DIAGNOSIS — E11.69 TYPE 2 DIABETES MELLITUS WITH HYPERCHOLESTEROLEMIA (HCC): ICD-10-CM

## 2024-05-31 DIAGNOSIS — E66.9 OBESITY (BMI 30-39.9): ICD-10-CM

## 2024-05-31 DIAGNOSIS — Z87.440 HISTORY OF UTI: ICD-10-CM

## 2024-05-31 DIAGNOSIS — Z86.39 HISTORY OF VITAMIN D DEFICIENCY: ICD-10-CM

## 2024-05-31 LAB
ALBUMIN SERPL-MCNC: 3.8 G/DL (ref 3.4–5)
ALBUMIN/GLOB SERPL: 1 {RATIO} (ref 1–2)
ALP LIVER SERPL-CCNC: 43 U/L
ALT SERPL-CCNC: 33 U/L
ANION GAP SERPL CALC-SCNC: 10 MMOL/L (ref 0–18)
AST SERPL-CCNC: 12 U/L (ref 15–37)
BILIRUB SERPL-MCNC: 2 MG/DL (ref 0.1–2)
BUN BLD-MCNC: 15 MG/DL (ref 9–23)
CALCIUM BLD-MCNC: 9.1 MG/DL (ref 8.5–10.1)
CHLORIDE SERPL-SCNC: 105 MMOL/L (ref 98–112)
CO2 SERPL-SCNC: 22 MMOL/L (ref 21–32)
CREAT BLD-MCNC: 0.84 MG/DL
CREAT UR-SCNC: 227 MG/DL
EGFRCR SERPLBLD CKD-EPI 2021: 88 ML/MIN/1.73M2 (ref 60–?)
EST. AVERAGE GLUCOSE BLD GHB EST-MCNC: 131 MG/DL (ref 68–126)
FASTING STATUS PATIENT QL REPORTED: YES
GLOBULIN PLAS-MCNC: 3.7 G/DL (ref 2.8–4.4)
GLUCOSE BLD-MCNC: 138 MG/DL (ref 70–99)
HBA1C MFR BLD: 6.2 % (ref ?–5.7)
MICROALBUMIN UR-MCNC: 1.76 MG/DL
MICROALBUMIN/CREAT 24H UR-RTO: 7.8 UG/MG (ref ?–30)
OSMOLALITY SERPL CALC.SUM OF ELEC: 287 MOSM/KG (ref 275–295)
POTASSIUM SERPL-SCNC: 4.6 MMOL/L (ref 3.5–5.1)
PROT SERPL-MCNC: 7.5 G/DL (ref 6.4–8.2)
SODIUM SERPL-SCNC: 137 MMOL/L (ref 136–145)
VIT D+METAB SERPL-MCNC: 21.7 NG/ML (ref 30–100)

## 2024-05-31 PROCEDURE — 82570 ASSAY OF URINE CREATININE: CPT

## 2024-05-31 PROCEDURE — 87086 URINE CULTURE/COLONY COUNT: CPT

## 2024-05-31 PROCEDURE — 80053 COMPREHEN METABOLIC PANEL: CPT

## 2024-05-31 PROCEDURE — 83036 HEMOGLOBIN GLYCOSYLATED A1C: CPT

## 2024-05-31 PROCEDURE — 82306 VITAMIN D 25 HYDROXY: CPT

## 2024-05-31 PROCEDURE — 82043 UR ALBUMIN QUANTITATIVE: CPT

## 2024-05-31 PROCEDURE — 36415 COLL VENOUS BLD VENIPUNCTURE: CPT

## 2024-06-16 ENCOUNTER — HOSPITAL ENCOUNTER (OUTPATIENT)
Age: 44
Discharge: HOME | End: 2024-06-16
Payer: MEDICAID

## 2024-06-16 VITALS
HEART RATE: 108 BPM | SYSTOLIC BLOOD PRESSURE: 130 MMHG | TEMPERATURE: 98 F | RESPIRATION RATE: 16 BRPM | DIASTOLIC BLOOD PRESSURE: 76 MMHG | OXYGEN SATURATION: 98 %

## 2024-06-16 DIAGNOSIS — U07.1 COVID-19: Primary | ICD-10-CM

## 2024-06-16 LAB
POCT INFLUENZA A: NEGATIVE
POCT INFLUENZA B: NEGATIVE
S PYO AG THROAT QL: NEGATIVE
SARS-COV-2 RNA RESP QL NAA+PROBE: DETECTED

## 2024-06-16 NOTE — ED INITIAL ASSESSMENT (HPI)
Pt traveled from [place]  Pt c/o vivienne ear pain, sore throat, chest congestion, slight cough.    Denies: fevers, N/V/D.

## 2024-06-16 NOTE — DISCHARGE INSTRUCTIONS
Rest and drink plenty of fluids.    This will help to thin the mucous in the back of your throat.   Take Tylenol and/or ibuprofen as needed for pain or fever.   Use Zyrtec, Claritin, or Allegra to help with nasal drainage.   Take this up to twice a day.   You can also use Nasonex or Flonase nasal spray to help with sinus symptoms.   Use your albuterol inhaler 2-4 puffs every 4 hours as needed for cough or congestion.   Take Robitussin or Delsym as needed for cough.   Follow up with your PCP in 5-7 days.     Your symptoms should improve in the next 7-10 days; however, the cough can linger for much longer.     Thank you for choosing [facility] for your care.

## 2024-06-16 NOTE — ED PROVIDER NOTES
Patient Seen in: [facility] [place]      History     Chief Complaint   Patient presents with    Sore Throat    Ear Pain    Cough/URI     Stated Complaint: Sore Throat    Subjective:   42 yo female presents to the immediate care with c/o congestion.  Patient started yesterday with nasal congestion, headache, sore throat, chest congestion, and bilateral ear pain.  She recently returned last Tuesday from [place].  She denies any fever, chills, ear drainage, difficulty swallowing, voice changes, shortness of breath, or chest pain.     The history is provided by the patient.       Objective:   Past Medical History:    Diabetes (HCC)    Diabetes mellitus (HCC)              Past Surgical History:   Procedure Laterality Date      13    Tubal ligation                  Social History     Socioeconomic History    Marital status:    Tobacco Use    Smoking status: Never    Smokeless tobacco: Never   Vaping Use    Vaping status: Never Used   Substance and Sexual Activity    Alcohol use: Not Currently     Comment: Rarely    Drug use: No   Other Topics Concern    Caffeine Concern No     Comment: coffee ocassionally    Exercise No    Seat Belt Yes   Social History Narrative    ** Merged History Encounter **                   Review of Systems   Constitutional: Negative.  Negative for chills and fever.   HENT:  Positive for congestion, ear pain and sore throat. Negative for ear discharge, rhinorrhea, sinus pressure, sinus pain, trouble swallowing and voice change.    Respiratory:  Positive for cough. Negative for chest tightness, shortness of breath and wheezing.    Cardiovascular: Negative.  Negative for chest pain.   Neurological:  Positive for headaches.   All other systems reviewed and are negative.      Positive for stated complaint: Sore Throat  Other systems are as noted in HPI.  Constitutional and vital signs reviewed.      All other systems reviewed and negative except as noted above.    Physical  Exam     ED Triage Vitals   BP 06/16/24 0851 130/76   Pulse 06/16/24 0851 108   Resp 06/16/24 0850 16   Temp 06/16/24 0850 97.6 °F (36.4 °C)   Temp src 06/16/24 0850 Temporal   SpO2 06/16/24 0851 98 %   O2 Device 06/16/24 0851 None (Room air)       Current Vitals:   Vital Signs  BP: 130/76  Pulse: 108  Resp: 16  Temp: 97.6 °F (36.4 °C)  Temp src: Temporal    Oxygen Therapy  SpO2: 98 %  O2 Device: None (Room air)            Physical Exam  Vitals and nursing note reviewed.   Constitutional:       General: She is not in acute distress.     Appearance: She is well-developed and normal weight. She is not ill-appearing.   HENT:      Head: Normocephalic and atraumatic.      Right Ear: Tympanic membrane and ear canal normal.      Left Ear: Tympanic membrane and ear canal normal.      Nose: Congestion present.      Mouth/Throat:      Mouth: Mucous membranes are moist.      Pharynx: Oropharynx is clear. Uvula midline. No pharyngeal swelling or posterior oropharyngeal erythema.      Tonsils: No tonsillar exudate or tonsillar abscesses. 0 on the right. 0 on the left.   Eyes:      Conjunctiva/sclera: Conjunctivae normal.      Pupils: Pupils are equal, round, and reactive to light.   Cardiovascular:      Rate and Rhythm: Normal rate and regular rhythm.      Heart sounds: Normal heart sounds.   Pulmonary:      Effort: Pulmonary effort is normal. No respiratory distress.      Breath sounds: Normal breath sounds.   Musculoskeletal:      Cervical back: Normal range of motion and neck supple.   Lymphadenopathy:      Cervical: No cervical adenopathy.   Skin:     General: Skin is warm and dry.   Neurological:      General: No focal deficit present.      Mental Status: She is alert and oriented to person, place, and time.   Psychiatric:         Mood and Affect: Mood normal.         Behavior: Behavior normal.           ED Course     Labs Reviewed   RAPID SARS-COV-2 BY PCR - Abnormal; Notable for the following components:       Result  Value    Rapid SARS-CoV-2 by PCR Detected (*)     All other components within normal limits   POCT RAPID STREP - Normal   POCT FLU TEST - Normal    Narrative:     This assay is a rapid molecular in vitro test utilizing nucleic acid amplification of influenza A and B viral RNA.          ED Course as of 06/16/24 0921  ------------------------------------------------------------  Time: 06/16 0906  Value: Rapid SARS-CoV-2 by PCR(!)  Comment: Positive.   ------------------------------------------------------------  Time: 06/16 0906  Value: POCT Rapid Strep  Comment: Negative.   ------------------------------------------------------------  Time: 06/16 0910  Value: POCT Flu Test  Comment: Negative.             [facility]           Medical Decision Making  43-year-old female with cough, congestion, and sore throat.  Rapid strep, COVID, and flu were ordered due to recent travel.    Patient is positive for COVID.  Strep and flu are negative.  Did discuss with patient the option of Paxlovid.  Patient does not want this medication at this time.  Hx and exam are consistent with a viral infection.  No indication for any other labs, imaging, or abx.  No evidence of sepsis, strep, otitis, pneumonia, bacterial sinusitis, or other bacterial etiology.  Counseled patient on supportive management at home.  F/u with PCP in a few days.  Verbalized understanding and agreement.    Amount and/or Complexity of Data Reviewed  Labs: ordered. Decision-making details documented in ED Course.    Risk  OTC drugs.        Disposition and Plan     Clinical Impression:  1. COVID-19         Disposition:  Discharge  6/16/2024  9:17 am    Follow-up:  Gale Barnes MD  [address] [address]  [phone]    In 1 week  As needed          Medications Prescribed:  Current Discharge Medication List

## 2024-06-24 ENCOUNTER — TELEPHONE (OUTPATIENT)
Dept: DERMATOLOGY | Age: 44
End: 2024-06-24

## 2024-06-25 ENCOUNTER — APPOINTMENT (OUTPATIENT)
Dept: DERMATOLOGY | Age: 44
End: 2024-06-25

## 2024-06-25 DIAGNOSIS — L71.9 ROSACEA: Primary | ICD-10-CM

## 2024-06-25 PROCEDURE — 99213 OFFICE O/P EST LOW 20 MIN: CPT | Performed by: DERMATOLOGY

## 2024-07-17 ENCOUNTER — OFFICE VISIT (OUTPATIENT)
Dept: FAMILY MEDICINE CLINIC | Facility: CLINIC | Age: 44
End: 2024-07-17
Payer: MEDICAID

## 2024-07-17 VITALS
HEART RATE: 100 BPM | WEIGHT: 167 LBS | SYSTOLIC BLOOD PRESSURE: 130 MMHG | TEMPERATURE: 97 F | OXYGEN SATURATION: 99 % | BODY MASS INDEX: 33 KG/M2 | DIASTOLIC BLOOD PRESSURE: 80 MMHG

## 2024-07-17 DIAGNOSIS — Z84.1 FAMILY HISTORY OF KIDNEY STONE: ICD-10-CM

## 2024-07-17 DIAGNOSIS — E03.9 ACQUIRED HYPOTHYROIDISM: ICD-10-CM

## 2024-07-17 DIAGNOSIS — Z80.0 FAMILY HISTORY OF COLON CANCER REQUIRING SCREENING COLONOSCOPY: ICD-10-CM

## 2024-07-17 DIAGNOSIS — E55.9 VITAMIN D DEFICIENCY: ICD-10-CM

## 2024-07-17 DIAGNOSIS — E78.2 ELEVATED CHOLESTEROL WITH ELEVATED TRIGLYCERIDES: ICD-10-CM

## 2024-07-17 DIAGNOSIS — Z12.39 ENCOUNTER FOR BREAST CANCER SCREENING USING NON-MAMMOGRAM MODALITY: ICD-10-CM

## 2024-07-17 DIAGNOSIS — E66.9 TYPE 2 DIABETES MELLITUS WITH OBESITY (HCC): Primary | ICD-10-CM

## 2024-07-17 DIAGNOSIS — E11.69 TYPE 2 DIABETES MELLITUS WITH OBESITY (HCC): Primary | ICD-10-CM

## 2024-07-17 DIAGNOSIS — M54.50 CHRONIC BILATERAL LOW BACK PAIN WITHOUT SCIATICA: ICD-10-CM

## 2024-07-17 DIAGNOSIS — G89.29 CHRONIC BILATERAL LOW BACK PAIN WITHOUT SCIATICA: ICD-10-CM

## 2024-07-17 DIAGNOSIS — Z13.5 SCREENING FOR DIABETIC RETINOPATHY: ICD-10-CM

## 2024-07-17 DIAGNOSIS — Z12.11 SCREEN FOR COLON CANCER: ICD-10-CM

## 2024-07-17 DIAGNOSIS — Z80.7 FAMILY HISTORY OF LYMPHOMA: ICD-10-CM

## 2024-07-17 PROBLEM — D72.829 LEUKOCYTOSIS: Status: RESOLVED | Noted: 2019-12-14 | Resolved: 2024-07-17

## 2024-07-17 LAB
T4 FREE SERPL-MCNC: 1.4 NG/DL (ref 0.8–1.7)
TSI SER-ACNC: 3.8 MIU/ML (ref 0.55–4.78)
VIT D+METAB SERPL-MCNC: 38.6 NG/ML (ref 30–100)

## 2024-07-17 PROCEDURE — 84439 ASSAY OF FREE THYROXINE: CPT | Performed by: NURSE PRACTITIONER

## 2024-07-17 PROCEDURE — 99214 OFFICE O/P EST MOD 30 MIN: CPT | Performed by: NURSE PRACTITIONER

## 2024-07-17 PROCEDURE — 84443 ASSAY THYROID STIM HORMONE: CPT | Performed by: NURSE PRACTITIONER

## 2024-07-17 PROCEDURE — 82306 VITAMIN D 25 HYDROXY: CPT | Performed by: NURSE PRACTITIONER

## 2024-07-17 RX ORDER — DULAGLUTIDE 3 MG/.5ML
3 INJECTION, SOLUTION SUBCUTANEOUS
COMMUNITY
Start: 2024-06-06

## 2024-07-17 NOTE — PROGRESS NOTES
HPI:   Patient presents today for diabetes follow up.    Starting Weight: 178 lb  Current Weight: 167 lb (was 163 lb at home)  Body mass index is 32.61 kg/m².  Current DM Medications: Trulicity 3 mg and Metformin. Has not started 4.5 mg dose ye/t; was also prescribed Phentermine in past (most recently in Feb 2024). Only had a couple of doses left  Medication Side Effects: no side effects  ACE/ARB: None  Statin: Atorvastatin  Last Diabetic Eye Exam: Never  Last Diabetic Foot Exam: Due  Other new issues or concerns today:     Lower back pain. Has been occurring on and off for awhile. Worries about her kidneys. Pain comes and goes. Not sure if it is related to hip. Had right hip injection in May.  Recently traveled to [place]. Saw provider in [place]. Concern about her thyroid. Had ultrasound in [place] that showed possible Hashimoto's Thyroid. Manny FIELD recommended increasing Levothyroxine to 75 mcg. Last TSH here was in normal range 2/2024.  Would like vitamin D level rechecked. It was low in [place] (level 20). Polish MD had her take 7000 units daily. Has finished that and would like to rechecking  Manny FIELD recommend screening breast us in addition to annual mammogram. Also recommend getting baseline screening colonoscopy due to family history of colon cancer.      Lab Results   Component Value Date    A1C 6.2 (H) 05/31/2024    A1C 6.6 (H) 02/19/2024    A1C 7.3 (H) 01/10/2024    A1C 7.2 (H) 11/29/2023    A1C 6.3 (H) 04/27/2023       Recent Results (from the past 4380 hour(s))   Comp Metabolic Panel (14) [E]    Collection Time: 05/31/24  9:26 AM   Result Value Ref Range    Glucose 138 (H) 70 - 99 mg/dL    Sodium 137 136 - 145 mmol/L    Potassium 4.6 3.5 - 5.1 mmol/L    Chloride 105 98 - 112 mmol/L    CO2 22.0 21.0 - 32.0 mmol/L    Anion Gap 10 0 - 18 mmol/L    BUN 15 9 - 23 mg/dL    Creatinine 0.84 0.55 - 1.02 mg/dL    Calcium, Total 9.1 8.5 - 10.1 mg/dL    Calculated Osmolality 287 275 - 295 mOsm/kg    eGFR-Cr 88 >=60  mL/min/1.73m2    AST 12 (L) 15 - 37 U/L    ALT 33 13 - 56 U/L    Alkaline Phosphatase 43 37 - 98 U/L    Bilirubin, Total 2.0 0.1 - 2.0 mg/dL    Total Protein 7.5 6.4 - 8.2 g/dL    Albumin 3.8 3.4 - 5.0 g/dL    Globulin  3.7 2.8 - 4.4 g/dL    A/G Ratio 1.0 1.0 - 2.0    Patient Fasting for CMP? Yes       Cholesterol: 137, done on 2024.  HDL Cholesterol: 45, done on 2024.  LDL Cholesterol: 79, done on 2024.  TriGlycerides 64, done on 2024.      Current Outpatient Medications   Medication Sig Dispense Refill    TRULICITY 3 MG/0.5ML Subcutaneous Solution Pen-injector Inject 3 mg into the skin.      diclofenac 75 MG Oral Tab EC 1 tab PO BID x10 days, then 1 tab PO qDaily PRN pain. Take with food. 40 tablet 0    metFORMIN  MG Oral Tablet 24 Hr Take 1 tablet (750 mg total) by mouth daily. 90 tablet 0    levothyroxine 50 MCG Oral Tab Take 1 tablet (50 mcg total) by mouth before breakfast. 90 tablet 0    atorvastatin 10 MG Oral Tab Take 1 tablet (10 mg total) by mouth nightly. 90 tablet 0    Dulaglutide (TRULICITY) 4.5 MG/0.5ML Subcutaneous Solution Pen-injector Inject 4.5 mg into the skin once a week. 6 mL 0      Past Medical History:    Diabetes (HCC)    Diabetes mellitus (HCC)    Leukocytosis      Past Surgical History:   Procedure Laterality Date      13    Tubal ligation        Family History   Problem Relation Age of Onset    Heart Disorder Father     Other (Hypothyroidism) Father     Diabetes Mother     Cancer Mother 67        lymphoma    No Known Problems Daughter     No Known Problems Son     Diabetes Maternal Grandmother     Heart Disorder Maternal Grandmother     Diabetes Maternal Grandfather     Heart Disorder Maternal Grandfather     Diabetes Paternal Grandmother     Heart Disorder Paternal Grandmother     Diabetes Paternal Grandfather     Heart Disorder Paternal Grandfather       Social History     Socioeconomic History    Marital status:    Tobacco Use     Smoking status: Never    Smokeless tobacco: Never   Vaping Use    Vaping status: Never Used   Substance and Sexual Activity    Alcohol use: Not Currently     Comment: Rarely    Drug use: No   Other Topics Concern    Caffeine Concern No     Comment: coffee ocassionally    Exercise No    Seat Belt Yes   Social History Narrative    ** Merged History Encounter **              REVIEW OF SYSTEMS:   Review of Systems   Constitutional:  Positive for appetite change (decreased with med) and fatigue (sometimes). Negative for chills and unexpected weight change.   Respiratory:  Negative for cough and shortness of breath.    Cardiovascular:  Negative for chest pain and palpitations.   Gastrointestinal:  Negative for constipation, diarrhea, nausea and vomiting.   Endocrine: Negative for cold intolerance, heat intolerance, polydipsia, polyphagia and polyuria.   Musculoskeletal:  Positive for back pain.       EXAM:   /80   Pulse 100   Temp 97.3 °F (36.3 °C)   Wt 167 lb (75.8 kg)   LMP 01/30/2024 (Within Days)   SpO2 99%   BMI 32.61 kg/m²   Physical Exam  Constitutional:       General: She is not in acute distress.     Appearance: Normal appearance.   Cardiovascular:      Rate and Rhythm: Normal rate and regular rhythm.      Heart sounds: Normal heart sounds.   Pulmonary:      Effort: Pulmonary effort is normal.      Breath sounds: Normal breath sounds.   Abdominal:      General: Bowel sounds are normal.      Palpations: Abdomen is soft.   Neurological:      General: No focal deficit present.      Mental Status: She is alert.   Psychiatric:         Mood and Affect: Mood normal.     Bilateral barefoot skin diabetic exam is normal, visualized feet and the appearance is normal.  Bilateral monofilament/sensation of both feet is normal.  Pulsation pedal pulse exam of both lower legs/feet is normal as well.    ASSESSMENT AND PLAN:   Diagnoses and all orders for this visit:    Type 2 diabetes mellitus with obesity (HCC)  -      Hemoglobin A1C [E]; Future  -     Lipid Panel [E]; Future  -     Comp Metabolic Panel (14) [E]; Future  -     OPHTHALMOLOGY - EXTERNAL    Elevated cholesterol with elevated triglycerides  -     Lipid Panel [E]; Future  -     Comp Metabolic Panel (14) [E]; Future    Acquired hypothyroidism  -     TSH and Free T4 [E]; Future  -     US THYROID (CPT=76536); Future    Screening for diabetic retinopathy  -     OPHTHALMOLOGY - EXTERNAL    Chronic bilateral low back pain without sciatica  -     XR LUMBAR SPINE (MIN 4 VIEWS) (CPT=72110); Future  -     US KIDNEYS (CPT=76775); Future    Family history of kidney stone  -     XR LUMBAR SPINE (MIN 4 VIEWS) (CPT=72110); Future  -     US KIDNEYS (CPT=76775); Future    Family history of lymphoma  -     CBC W Differential W Platelet [E]; Future    Vitamin D deficiency  -     Vitamin D [E]; Future    Family history of colon cancer requiring screening colonoscopy  -     Surgery Referral - In Network    Screen for colon cancer  -     Surgery Referral - In Network    Encounter for breast cancer screening using non-mammogram modality  -     US BREAST BILATERAL COMPLETE (CPT=76641-50); Future    Check thyroid labs and vitamin D today  Due for repeat diabetic labs at end of August  Screening breast ultrasound ordered  Referral placed for Dr. Torres for colonoscopy  Ophthalmology referral placed  Will recheck thyroid us, last done in 4/2022  Check lumbar xray and renal us for intermittent lower back pain

## 2024-07-19 RX ORDER — PHENTERMINE HYDROCHLORIDE 37.5 MG/1
37.5 TABLET ORAL
Qty: 30 TABLET | Refills: 0 | OUTPATIENT
Start: 2024-07-19

## 2024-07-19 NOTE — TELEPHONE ENCOUNTER
LOV: 24 for type 2 diabetes       Phentermine HCl 37.5 MG Oral Tab ()  Take 1 tablet (37.5 mg total) by mouth every morning before breakfast. Dispense: 30 tablet, Refills: 0 ordered   2024     Future Appointments   Date Time Provider Department Center   2024  9:30 AM OS XR RM1 OS XRAY [place]   2024  9:55 AM OS US RM1 OS US [place]   2024 11:30 AM OS US RM1 OS US [place]

## 2024-07-22 ENCOUNTER — HOSPITAL ENCOUNTER (OUTPATIENT)
Dept: GENERAL RADIOLOGY | Age: 44
Discharge: HOME | End: 2024-07-22
Attending: NURSE PRACTITIONER
Payer: MEDICAID

## 2024-07-22 ENCOUNTER — PATIENT MESSAGE (OUTPATIENT)
Dept: FAMILY MEDICINE CLINIC | Facility: CLINIC | Age: 44
End: 2024-07-22

## 2024-07-22 DIAGNOSIS — G89.29 CHRONIC BILATERAL LOW BACK PAIN WITHOUT SCIATICA: ICD-10-CM

## 2024-07-22 DIAGNOSIS — M54.50 CHRONIC BILATERAL LOW BACK PAIN WITHOUT SCIATICA: ICD-10-CM

## 2024-07-22 DIAGNOSIS — Z84.1 FAMILY HISTORY OF KIDNEY STONE: ICD-10-CM

## 2024-07-22 PROCEDURE — 72110 X-RAY EXAM L-2 SPINE 4/>VWS: CPT | Performed by: NURSE PRACTITIONER

## 2024-07-22 NOTE — TELEPHONE ENCOUNTER
Last office visit - 7/17/24 with IRMA Uribe    Last Phentermine ordered 2/22/24 for quantity #30

## 2024-07-22 NOTE — TELEPHONE ENCOUNTER
From: Zenobia Mccoy  To: Maureen Leyva  Sent: 7/22/2024 11:56 AM CDT  Subject: Request fir a refill     Hello ,I was wonder if I can get refill of phentermine 37.5 MG. You can see last time I had zonia was in February. And i had long break .It helps me to stay in truck with weight. 1x refill would help me for long time.   Zenobia

## 2024-07-25 DIAGNOSIS — M54.50 CHRONIC BILATERAL LOW BACK PAIN WITHOUT SCIATICA: Primary | ICD-10-CM

## 2024-07-25 DIAGNOSIS — G89.29 CHRONIC BILATERAL LOW BACK PAIN WITHOUT SCIATICA: Primary | ICD-10-CM

## 2024-07-25 DIAGNOSIS — M47.816 FACET ARTHROPATHY, LUMBAR: ICD-10-CM

## 2024-08-05 ENCOUNTER — HOSPITAL ENCOUNTER (OUTPATIENT)
Dept: ULTRASOUND IMAGING | Age: 44
Discharge: HOME | End: 2024-08-05
Attending: NURSE PRACTITIONER
Payer: MEDICAID

## 2024-08-05 ENCOUNTER — TELEPHONE (OUTPATIENT)
Dept: FAMILY MEDICINE CLINIC | Facility: CLINIC | Age: 44
End: 2024-08-05

## 2024-08-05 DIAGNOSIS — Z84.1 FAMILY HISTORY OF KIDNEY STONE: ICD-10-CM

## 2024-08-05 DIAGNOSIS — M54.50 CHRONIC BILATERAL LOW BACK PAIN WITHOUT SCIATICA: ICD-10-CM

## 2024-08-05 DIAGNOSIS — G89.29 CHRONIC BILATERAL LOW BACK PAIN WITHOUT SCIATICA: ICD-10-CM

## 2024-08-05 PROCEDURE — 76770 US EXAM ABDO BACK WALL COMP: CPT | Performed by: NURSE PRACTITIONER

## 2024-08-05 NOTE — TELEPHONE ENCOUNTER
----- Message from Maureen Leyva sent at 8/5/2024  1:43 PM CDT -----  Results reviewed. No kidney stones

## 2024-08-06 NOTE — TELEPHONE ENCOUNTER
Message left on patient's identified cell phone with imaging results:  No kidney stones.  To call office back with any questions

## 2024-08-07 ENCOUNTER — HOSPITAL ENCOUNTER (OUTPATIENT)
Dept: ULTRASOUND IMAGING | Age: 44
Discharge: HOME | End: 2024-08-07
Attending: NURSE PRACTITIONER
Payer: MEDICAID

## 2024-08-07 ENCOUNTER — TELEPHONE (OUTPATIENT)
Dept: FAMILY MEDICINE CLINIC | Facility: CLINIC | Age: 44
End: 2024-08-07

## 2024-08-07 DIAGNOSIS — E03.9 ACQUIRED HYPOTHYROIDISM: ICD-10-CM

## 2024-08-07 PROCEDURE — 76536 US EXAM OF HEAD AND NECK: CPT | Performed by: NURSE PRACTITIONER

## 2024-08-07 NOTE — TELEPHONE ENCOUNTER
----- Message from Maureen Leyva sent at 8/7/2024  1:06 PM CDT -----  Results reviewed.   No thyroid nodules noted

## 2024-08-13 ENCOUNTER — TELEPHONE (OUTPATIENT)
Dept: FAMILY MEDICINE CLINIC | Facility: CLINIC | Age: 44
End: 2024-08-13

## 2024-08-13 NOTE — TELEPHONE ENCOUNTER
Patient went to ER yesterday for Abdominal cramping.  Patient is still in a lot of pain and states she wants to  f/u with a Provider today for further evaluation and testing.  Patient has an appointment with Maureen Leyva tomorrow.   No available appts today in [facility] or with Denisse or Pankaj.  Any other provider she can follow up with?

## 2024-08-13 NOTE — TELEPHONE ENCOUNTER
Spoke with patient -   Was seen in [facility] ER yesterday for abdominal pain/cramping.  Had labs/CT scan done.  Prescribed Bentyl and Zofran.  Unable to provide a stool sample at that time in the ER.    Patient called to see it she could be seen today.  States \"abdominal pain intermittent - not as intense as yesterday.  Did take Bentyl/Zofran early this morning and was able to sleep\"  Was concerned that she may run out of medication is she waited until tomorrow.  Patient also ate some cream of chicken soup.    Denies fever  No vomiting  Last BM - last night, diarrhea, \"watery\"    Reviewed medications and timing with patient. Patient has enough supply until tomorrow.  Reviewed clear liquid diet - no dairy products  To go to ER if pain worsens, develops fever, vomiting  Future Appointments   Date Time Provider Department Center   8/14/2024  1:20 PM Maureen Leyva APRN [facility] [place]   8/28/2024 11:15 AM PF MRI RM1 (1.5T) PF MRI [place]   9/4/2024 10:55 AM Kar Coburn DO PM[id number]     Patient verbalized understanding

## 2024-08-14 ENCOUNTER — OFFICE VISIT (OUTPATIENT)
Dept: FAMILY MEDICINE CLINIC | Facility: CLINIC | Age: 44
End: 2024-08-14
Payer: MEDICAID

## 2024-08-14 VITALS
SYSTOLIC BLOOD PRESSURE: 120 MMHG | DIASTOLIC BLOOD PRESSURE: 70 MMHG | OXYGEN SATURATION: 99 % | HEART RATE: 113 BPM | TEMPERATURE: 97 F

## 2024-08-14 DIAGNOSIS — R19.7 DIARRHEA, UNSPECIFIED TYPE: Primary | ICD-10-CM

## 2024-08-14 DIAGNOSIS — R10.9 ABDOMINAL CRAMPING: ICD-10-CM

## 2024-08-14 DIAGNOSIS — R11.0 NAUSEA: ICD-10-CM

## 2024-08-14 PROCEDURE — 99213 OFFICE O/P EST LOW 20 MIN: CPT | Performed by: NURSE PRACTITIONER

## 2024-08-14 RX ORDER — ONDANSETRON 4 MG/1
4 TABLET, ORALLY DISINTEGRATING ORAL
COMMUNITY
Start: 2024-08-12

## 2024-08-14 RX ORDER — DICYCLOMINE HCL 20 MG
20 TABLET ORAL
COMMUNITY
Start: 2024-08-12 | End: 2024-08-14

## 2024-08-14 RX ORDER — DICYCLOMINE HCL 20 MG
20 TABLET ORAL
Qty: 21 TABLET | Refills: 0 | Status: SHIPPED | OUTPATIENT
Start: 2024-08-14

## 2024-08-14 NOTE — PROGRESS NOTES
HPI:     Patient is here for ER follow up from GI issues including nausea and diarrhea. Patient was given Bentyl and Zofran at that time and has been taking those since. Patient reports that she is feeling a bit better but feels that this is a cover up of a bigger issue. Reports that her pain is worse now and that she is bloated. She had a pain episode so bad on Monday that she went back to the [facility] and they gave her more nausea medications and did a CT ab pelvis which was unremarkable. They also at that time did a urine analysis. The doctor in the ER told her that it might be a parasite. She reports that she has not had diarrhea since yesterday and has taken 6 of the 12 doses of bentyl. Is concerned that it is a parasite. Reports that she has \"13 of they 14 symptoms of a parasite\".     Current Outpatient Medications   Medication Sig Dispense Refill    ondansetron 4 MG Oral Tablet Dispersible Take 1 tablet (4 mg total) by mouth.      dicyclomine 20 MG Oral Tab Take 1 tablet (20 mg total) by mouth 4 (four) times daily before meals and nightly. 21 tablet 0    TRULICITY 3 MG/0.5ML Subcutaneous Solution Pen-injector Inject 3 mg into the skin.      Dulaglutide (TRULICITY) 4.5 MG/0.5ML Subcutaneous Solution Pen-injector Inject 4.5 mg into the skin once a week. 6 mL 0    diclofenac 75 MG Oral Tab EC 1 tab PO BID x10 days, then 1 tab PO qDaily PRN pain. Take with food. 40 tablet 0    metFORMIN  MG Oral Tablet 24 Hr Take 1 tablet (750 mg total) by mouth daily. 90 tablet 0    levothyroxine 50 MCG Oral Tab Take 1 tablet (50 mcg total) by mouth before breakfast. 90 tablet 0    atorvastatin 10 MG Oral Tab Take 1 tablet (10 mg total) by mouth nightly. 90 tablet 0      Past Medical History:    Diabetes (HCC)    Diabetes mellitus (HCC)    Leukocytosis      Past Surgical History:   Procedure Laterality Date      13    Tubal ligation        Family History   Problem Relation Age of Onset    Heart Disorder  Father     Other (Hypothyroidism) Father     Diabetes Mother     Cancer Mother 67        lymphoma    No Known Problems Daughter     No Known Problems Son     Diabetes Maternal Grandmother     Heart Disorder Maternal Grandmother     Diabetes Maternal Grandfather     Heart Disorder Maternal Grandfather     Diabetes Paternal Grandmother     Heart Disorder Paternal Grandmother     Diabetes Paternal Grandfather     Heart Disorder Paternal Grandfather       Social History     Socioeconomic History    Marital status:    Tobacco Use    Smoking status: Never    Smokeless tobacco: Never   Vaping Use    Vaping status: Never Used   Substance and Sexual Activity    Alcohol use: Not Currently     Comment: Rarely    Drug use: No   Other Topics Concern    Caffeine Concern No     Comment: coffee ocassionally    Exercise No    Seat Belt Yes   Social History Narrative    ** Merged History Encounter **          Social Determinants of Health      Received from [facility]    Housing Stability         REVIEW OF SYSTEMS:   Review of Systems   Constitutional:  Positive for appetite change (mildly decreased, able to drink fluids) and fatigue. Negative for chills and fever.   Respiratory:  Negative for chest tightness and shortness of breath.    Cardiovascular:  Negative for chest pain.   Gastrointestinal:  Positive for abdominal pain, diarrhea and nausea. Negative for blood in stool and vomiting.   Genitourinary:  Negative for decreased urine volume, difficulty urinating, dysuria and flank pain.   Musculoskeletal:  Negative for gait problem, joint swelling and myalgias.   Skin:  Negative for rash.   Neurological:  Negative for dizziness and light-headedness.   Psychiatric/Behavioral:  Negative for confusion.        EXAM:   /70   Pulse 113   Temp 97.3 °F (36.3 °C)   LMP 01/30/2024 (Within Days)   SpO2 99%   Physical Exam  Constitutional:       General: She is not in acute distress.     Appearance: Normal  appearance. She is not ill-appearing.   Cardiovascular:      Rate and Rhythm: Normal rate and regular rhythm.      Pulses: Normal pulses.   Pulmonary:      Effort: Pulmonary effort is normal.      Breath sounds: Normal breath sounds.   Abdominal:      General: Bowel sounds are normal. There is no distension.      Palpations: Abdomen is soft.      Tenderness: There is no abdominal tenderness. There is no guarding or rebound.   Neurological:      Mental Status: She is alert.   Psychiatric:         Mood and Affect: Mood normal.         ASSESSMENT AND PLAN:   Diagnoses and all orders for this visit:    Diarrhea, unspecified type  -     Stool Culture w/Shigatoxin [E]; Future  -     Ova and Parasite; Future  -     Gastro Referral - In Network    Abdominal cramping  -     Stool Culture w/Shigatoxin [E]; Future  -     Ova and Parasite; Future  -     Gastro Referral - In Network  -     dicyclomine 20 MG Oral Tab; Take 1 tablet (20 mg total) by mouth 4 (four) times daily before meals and nightly.    Nausea  -     Stool Culture w/Shigatoxin [E]; Future  -     Ova and Parasite; Future  -     Gastro Referral - In Network    Reviewed ER records  Continue bland, BRAT diet. Avoid dairy  Push fluids  Stool testing ordered  If persisting, see GI. Referral placed  If worsening, should follow up or return to the ER

## 2024-08-16 ENCOUNTER — TELEPHONE (OUTPATIENT)
Dept: FAMILY MEDICINE CLINIC | Facility: CLINIC | Age: 44
End: 2024-08-16

## 2024-08-16 ENCOUNTER — PATIENT MESSAGE (OUTPATIENT)
Dept: FAMILY MEDICINE CLINIC | Facility: CLINIC | Age: 44
End: 2024-08-16

## 2024-08-16 NOTE — TELEPHONE ENCOUNTER
Status Of Case is DENIED.  Attempted to reach out to patient by phone and/or Mychart.  Insurance will not cover without approval.  PATIENT CANNOT PROCEED.  Please have patient reach out to provider for next steps.

## 2024-08-16 NOTE — TELEPHONE ENCOUNTER
Imaging due  Mychart message sent  Future Appointments   Date Time Provider Department Center   8/28/2024 11:15 AM PF MRI RM1 (1.5T) [facility] [place]   9/4/2024 10:55 AM [id number]

## 2024-08-16 NOTE — TELEPHONE ENCOUNTER
Status: DENIED        Reference number  [id number]     A copy of the denial letter is filed under the MEDIA tab, reference for complete details. You may reach out to Federica  at [phone] to discuss decision.     Please reach out to patient with plan of care.      Thank you

## 2024-08-20 ENCOUNTER — LAB ENCOUNTER (OUTPATIENT)
Dept: LAB | Age: 44
End: 2024-08-20
Attending: NURSE PRACTITIONER
Payer: MEDICAID

## 2024-08-20 DIAGNOSIS — R19.7 DIARRHEA, UNSPECIFIED TYPE: ICD-10-CM

## 2024-08-20 DIAGNOSIS — R10.9 ABDOMINAL CRAMPING: ICD-10-CM

## 2024-08-20 DIAGNOSIS — R11.0 NAUSEA: ICD-10-CM

## 2024-08-20 PROCEDURE — 87046 STOOL CULTR AEROBIC BACT EA: CPT

## 2024-08-20 PROCEDURE — 87045 FECES CULTURE AEROBIC BACT: CPT

## 2024-08-20 PROCEDURE — 87427 SHIGA-LIKE TOXIN AG IA: CPT

## 2024-08-20 PROCEDURE — 87177 OVA AND PARASITES SMEARS: CPT

## 2024-08-20 PROCEDURE — 87015 SPECIMEN INFECT AGNT CONCNTJ: CPT

## 2024-08-20 PROCEDURE — 87209 SMEAR COMPLEX STAIN: CPT

## 2024-08-22 ENCOUNTER — TELEPHONE (OUTPATIENT)
Dept: FAMILY MEDICINE CLINIC | Facility: CLINIC | Age: 44
End: 2024-08-22

## 2024-08-22 NOTE — TELEPHONE ENCOUNTER
----- Message from Maureen Leyva sent at 8/22/2024  8:06 AM CDT -----  Results reviewed.   No shigatoxins in stool

## 2024-08-23 ENCOUNTER — TELEPHONE (OUTPATIENT)
Dept: FAMILY MEDICINE CLINIC | Facility: CLINIC | Age: 44
End: 2024-08-23

## 2024-08-23 NOTE — TELEPHONE ENCOUNTER
----- Message from Maureen Leyva sent at 8/23/2024  1:49 PM CDT -----  Results reviewed.   Stool culture negative

## 2024-08-28 ENCOUNTER — TELEPHONE (OUTPATIENT)
Dept: FAMILY MEDICINE CLINIC | Facility: CLINIC | Age: 44
End: 2024-08-28

## 2024-08-28 NOTE — TELEPHONE ENCOUNTER
----- Message from Maureen Leyva sent at 8/28/2024  7:28 AM CDT -----  Results reviewed.   No ova or parasites noted  How is patient's diarrhea?

## 2024-09-04 ENCOUNTER — OFFICE VISIT (OUTPATIENT)
Dept: PHYSICAL MEDICINE AND REHAB | Facility: CLINIC | Age: 44
End: 2024-09-04
Payer: MEDICAID

## 2024-09-04 DIAGNOSIS — M54.16 RIGHT LUMBAR RADICULITIS: Primary | ICD-10-CM

## 2024-09-04 PROCEDURE — 99214 OFFICE O/P EST MOD 30 MIN: CPT | Performed by: PHYSICAL MEDICINE & REHABILITATION

## 2024-09-04 RX ORDER — METHYLPREDNISOLONE 4 MG
TABLET, DOSE PACK ORAL
Qty: 1 EACH | Refills: 0 | Status: SHIPPED | OUTPATIENT
Start: 2024-09-04

## 2024-09-04 NOTE — PATIENT INSTRUCTIONS
Try to drink 1 extra glass of water a day and see if that helps with the cramping.     Take the medrol dosepack as prescribed.     Spend 10-15 minutes dedicated to walking per day.      Let me know how you are doing in 1 weeks' time. If no better I will order an MRI of the lower back.

## 2024-09-04 NOTE — PROGRESS NOTES
Progress note    C/C:   Chief Complaint   Patient presents with    Follow - Up     LOV 5/1/24. Patient presents for low back pain. Pain has been present since June. Pain 0/10. Pain 8/10 on a bad. Admits N/T on right leg. Patient states she also feel cramps every night on the right calf. Pain radiating down the right leg. Takes Tylenol for pain with some relief. XR 7/22/24.      HPI: 44 year old female presents for follow up. Had right hip joint steroid injection in May. Helped relieve right lateral hip pain, but in June began having lower back pain in June without inciting event. Was seen by PCP, workup for renal disorders which was negative. Had an MRI of the lumbar spine ordered but was denied by insurance.     Has been having low back pain into the right upper buttock, along with leg cramps in the calf. The cramps occur every early morning around 2-3am. Foot cramps in an inverted position, and needs to get up and stretch the calf and lower leg. Began taking magnesium since July, no benefit. Not limited in walking; no increased back or calf pain with standing and walking. Does have occasional tingling in the calf. No increased symptoms with sitting. No aggravating factor she can identify, except to state that symptoms tend to worsen in the evening and nighttime.  No weakness.    Pertinent allergies: No Known Allergies     Physical exam:  [facility] 01/30/2024 (Within Days)      Lumbar spine exam:    No skin rash lumbar/upper sacral region  No pain with lumbar flexion. No pain with lumbar extension.  No tenderness to palpation bilateral lumbar paraspinals. + ttp right PSIS.  5/5 LE strength b/l in HF, KE, ADF, EHL, ankle eversion  SILT b/l LE  2/4 quad, gastrocs reflexes b/l  Facet loading negative  Seated slump test negative  SLR negative left  Mild groin pain with scouring of the right hip    Imaging: X-ray lumbar spine independently reviewed, as was report.  Mild to moderate facet arthrosis at L5-S1.  Otherwise  normal.    Assessment and plan  Right lumbar radiculitis  Right hip labral tear, JACOB, minimally symptomatic    Recommend Medrol Dosepak.  She was given a home exercise program consisting of lumbar extensions, therapeutic walking most if not all days of the week.  If no improvement at all upon completion of the Medrol Dosepak and with the home exercise program then I recommend an MRI of the lumbar spine.    Kar Coburn DO  Physical Medicine and Rehabilitation  [facility]

## 2024-09-17 ENCOUNTER — APPOINTMENT (OUTPATIENT)
Dept: DERMATOLOGY | Age: 44
End: 2024-09-17

## 2024-09-25 DIAGNOSIS — E66.9 TYPE 2 DIABETES MELLITUS WITH OBESITY (HCC): ICD-10-CM

## 2024-09-25 DIAGNOSIS — E11.69 TYPE 2 DIABETES MELLITUS WITH OBESITY (HCC): ICD-10-CM

## 2024-09-25 RX ORDER — ATORVASTATIN CALCIUM 10 MG/1
10 TABLET, FILM COATED ORAL NIGHTLY
Qty: 90 TABLET | Refills: 0 | Status: SHIPPED | OUTPATIENT
Start: 2024-09-25

## 2024-09-25 NOTE — TELEPHONE ENCOUNTER
Cholesterol Medication Protocol [id number]/25/2024 12:27 AM   Protocol Details ALT < 80    ALT resulted within past year    Lipid panel within past 12 months    In person appointment or virtual visit in the past 12 mos or appointment in next 3 mos     Request for  ATORVASTATIN 10 MG Oral Tab     LOV 8/14/24 with    Maureen Leyva APRN     Last refill 2/21/24 - 90 tablets 0 refill   Future Appointments   Date Time Provider Department Center   10/16/2024 10:30 AM Marina Hernández APRN [id number]   Labs 5/31/24

## 2024-09-30 ENCOUNTER — TELEPHONE (OUTPATIENT)
Dept: FAMILY MEDICINE CLINIC | Facility: CLINIC | Age: 44
End: 2024-09-30

## 2024-09-30 NOTE — TELEPHONE ENCOUNTER
Jolie Leyva, our nurse practitioner ordered fasting labs on 7/17/24 for patient  Only TSH and vitamin D were done  Had external CBC and CMP done 8/12/24  What does she need to complete at this time?    Future Appointments   Date Time Provider Department Center   10/16/2024 10:30 AM Marina Hernández APRN [id number]

## 2024-10-03 ENCOUNTER — TELEPHONE (OUTPATIENT)
Dept: FAMILY MEDICINE CLINIC | Facility: CLINIC | Age: 44
End: 2024-10-03

## 2024-10-03 ENCOUNTER — LAB ENCOUNTER (OUTPATIENT)
Dept: LAB | Age: 44
End: 2024-10-03
Attending: NURSE PRACTITIONER
Payer: MEDICAID

## 2024-10-03 DIAGNOSIS — E78.2 ELEVATED CHOLESTEROL WITH ELEVATED TRIGLYCERIDES: ICD-10-CM

## 2024-10-03 DIAGNOSIS — E66.9 TYPE 2 DIABETES MELLITUS WITH OBESITY (HCC): Primary | ICD-10-CM

## 2024-10-03 DIAGNOSIS — E11.69 TYPE 2 DIABETES MELLITUS WITH OBESITY (HCC): ICD-10-CM

## 2024-10-03 DIAGNOSIS — E66.9 TYPE 2 DIABETES MELLITUS WITH OBESITY (HCC): ICD-10-CM

## 2024-10-03 DIAGNOSIS — E11.69 TYPE 2 DIABETES MELLITUS WITH OBESITY (HCC): Primary | ICD-10-CM

## 2024-10-03 DIAGNOSIS — R17 ELEVATED BILIRUBIN: ICD-10-CM

## 2024-10-03 LAB
ALBUMIN SERPL-MCNC: 4.4 G/DL (ref 3.2–4.8)
ALBUMIN/GLOB SERPL: 1.4 {RATIO} (ref 1–2)
ALP LIVER SERPL-CCNC: 47 U/L
ALT SERPL-CCNC: 25 U/L
ANION GAP SERPL CALC-SCNC: 3 MMOL/L (ref 0–18)
AST SERPL-CCNC: 22 U/L (ref ?–34)
BILIRUB SERPL-MCNC: 2.3 MG/DL (ref 0.3–1.2)
BUN BLD-MCNC: 15 MG/DL (ref 9–23)
CALCIUM BLD-MCNC: 10 MG/DL (ref 8.7–10.4)
CHLORIDE SERPL-SCNC: 106 MMOL/L (ref 98–112)
CHOLEST SERPL-MCNC: 142 MG/DL (ref ?–200)
CO2 SERPL-SCNC: 27 MMOL/L (ref 21–32)
CREAT BLD-MCNC: 0.84 MG/DL
EGFRCR SERPLBLD CKD-EPI 2021: 88 ML/MIN/1.73M2 (ref 60–?)
EST. AVERAGE GLUCOSE BLD GHB EST-MCNC: 151 MG/DL (ref 68–126)
FASTING PATIENT LIPID ANSWER: YES
FASTING STATUS PATIENT QL REPORTED: YES
GLOBULIN PLAS-MCNC: 3.2 G/DL (ref 2–3.5)
GLUCOSE BLD-MCNC: 117 MG/DL (ref 70–99)
HBA1C MFR BLD: 6.9 % (ref ?–5.7)
HDLC SERPL-MCNC: 48 MG/DL (ref 40–59)
LDLC SERPL CALC-MCNC: 74 MG/DL (ref ?–100)
NONHDLC SERPL-MCNC: 94 MG/DL (ref ?–130)
OSMOLALITY SERPL CALC.SUM OF ELEC: 284 MOSM/KG (ref 275–295)
POTASSIUM SERPL-SCNC: 4.6 MMOL/L (ref 3.5–5.1)
PROT SERPL-MCNC: 7.6 G/DL (ref 5.7–8.2)
SODIUM SERPL-SCNC: 136 MMOL/L (ref 136–145)
TRIGL SERPL-MCNC: 111 MG/DL (ref 30–149)
VLDLC SERPL CALC-MCNC: 17 MG/DL (ref 0–30)

## 2024-10-03 PROCEDURE — 83036 HEMOGLOBIN GLYCOSYLATED A1C: CPT

## 2024-10-03 PROCEDURE — 80061 LIPID PANEL: CPT

## 2024-10-03 PROCEDURE — 80053 COMPREHEN METABOLIC PANEL: CPT

## 2024-10-03 PROCEDURE — 36415 COLL VENOUS BLD VENIPUNCTURE: CPT

## 2024-10-03 NOTE — TELEPHONE ENCOUNTER
----- Message from Maureen Leyva sent at 10/3/2024  2:57 PM CDT -----  Results reviewed.   A1C mildly increased from previous. What does of Trulicity is patient currently on?  Chemistries stable except for hyperglycemia  Cholesterol excellent

## 2024-10-07 NOTE — TELEPHONE ENCOUNTER
Spoke with patient -   Reviewed A1C results - has been taking 4.5mg Trulicity but had a 2-3 week time that she didn't take it due to not in stock.    Asking if she should make a video visit to discuss Trulicity and if there is other possibilities    She also takes Metformin.    Was also concerned about Total bilirubin being elevated to 2.3

## 2024-10-07 NOTE — TELEPHONE ENCOUNTER
I would recommend seeing endo if looking for other options  Her insurance will not cover different GLP-1  Will monitor bilirubin level. Recheck in 1 month

## 2024-10-09 NOTE — TELEPHONE ENCOUNTER
Spoke with patient - understands that she needs to have bilirubin checked in 1 month.    Per patient - ok to send endocrinologist information through My Pick Box.  States will call and make appointment.    Has enough Trulicity as this time for 1 more month.    Has f/u with GI next week.

## 2024-10-09 NOTE — TELEPHONE ENCOUNTER
Left message to call office back    Dr Crystal Vaughn with [facility]. Her phone number is [phone]   Fax: [phone]    Reminder placed for 1 month for labs

## 2024-10-15 ENCOUNTER — APPOINTMENT (OUTPATIENT)
Dept: DERMATOLOGY | Age: 44
End: 2024-10-15

## 2024-11-08 ENCOUNTER — OFFICE VISIT (OUTPATIENT)
Facility: CLINIC | Age: 44
End: 2024-11-08
Payer: MEDICAID

## 2024-11-08 VITALS
BODY MASS INDEX: 32.98 KG/M2 | DIASTOLIC BLOOD PRESSURE: 74 MMHG | OXYGEN SATURATION: 98 % | WEIGHT: 168 LBS | SYSTOLIC BLOOD PRESSURE: 128 MMHG | HEIGHT: 60 IN | HEART RATE: 115 BPM

## 2024-11-08 DIAGNOSIS — E66.9 TYPE 2 DIABETES MELLITUS WITH OBESITY (HCC): Primary | ICD-10-CM

## 2024-11-08 DIAGNOSIS — E11.69 TYPE 2 DIABETES MELLITUS WITH OBESITY (HCC): Primary | ICD-10-CM

## 2024-11-08 DIAGNOSIS — E55.9 VITAMIN D DEFICIENCY: ICD-10-CM

## 2024-11-08 DIAGNOSIS — E78.5 HYPERLIPIDEMIA, UNSPECIFIED HYPERLIPIDEMIA TYPE: ICD-10-CM

## 2024-11-08 DIAGNOSIS — E03.9 ACQUIRED HYPOTHYROIDISM: ICD-10-CM

## 2024-11-08 PROCEDURE — 99215 OFFICE O/P EST HI 40 MIN: CPT | Performed by: NURSE PRACTITIONER

## 2024-11-08 RX ORDER — MAGNESIUM 30 MG
30 TABLET ORAL
COMMUNITY

## 2024-11-08 NOTE — PROGRESS NOTES
[facility] Note    Name: Zenobia Mccoy    Date: 24    Zenobia Mccoy is a 44 year old female who presents for evaluation of T2DM management.     Chief complaint: New Patient (NP here to establish care for DM2, patient would like to discuss other possible medications. States that current regimen is not working for her/Pt checks BG via fingersticks, patient has logs/glucometer with./Fasting bg today 116 per pt/Last A1c value was 6.9% done 10/3/2024. /Last Diabetic Eye Exam-23/Last Diabetic Foot Exam-24)       Subjective:   DM hx:  -Diagnosed with diabetes in age 32 while she was pregnant with her first child  -Started insuln: age 32, was both on insulin for both pregnancy  -Family history- yes, mother and maternal grandparent    Initial HPI consult - 2024    DM meds at first office visit: trulicity 4.5 mg, Metformin 750 mg daily   --> states sometimes she forgets to take her metformin once a week. . She was not taking trulicity for at least 3 weeks last summer because of pharmacy does not have availability.     Blood Glucose log reviewed. Checking 1 times per day. Morning/fastin-161,( 10/20 fasting above 130)episodes of hypoglycemia: No    -Re: potential DM medication contraindication (if positive, checkbox selected):  [] History of pancreatitis- denies   [] Personal/fam hx of medullary thyroid cancer/MEN2-denies   [] History of recent/frequent UTI/yeast infxn-denies  [] Previous amputation related to diabetes-denies     -Presence of associated DM complications (if positive, checkbox selected):  [] Macrovascular complications (CAD/CVA/PAD)-denies   [] Neuropathy-sometimes cramps to legs   [] Retinopathy-denies   [] Nephropathy-denies   [] HTN-denies   [x] Hyperlipidemia  [] Stroke/TIA-denies   [] Gastroparesis-denies   [] Wound, ulcer or amputations-denies     - Lifestyle: likes to eat sweets, does not drink soda  - Modifying factors: sometimes forget to take her  Metformin.   - Steroid use: Yes: about a month ago due to hip pain       Previously trialed/failed DM meds: insulin while she was on pregnant     #Hypothyroidism  - denies hx of neck surgery or  neck radiation   -states she was raised in [place], and states she and her parents went to the doctor to get medicine. States she was about 3000 miles from the [facility].   -states she started levothyroxine at age 42, , takes it appropriately and takes it in empty stomach.   -thyroid US last 08/2024 no nodules.   - currently taking 50 mcg of levothyroxine, not recently changed since age 42.   History/Other:    Allergies, PMH, SocHx and FHx reviewed and updated as appropriate in Epic on    magnesium 30 MG Oral Tab Take 1 tablet (30 mg total) by mouth daily with breakfast.      Omega-3 Fatty Acids (FISH OIL) 300 MG Oral Cap Take by mouth.      ATORVASTATIN 10 MG Oral Tab TAKE 1 TABLET BY MOUTH NIGHTLY. 90 tablet 0    ondansetron 4 MG Oral Tablet Dispersible Take 1 tablet (4 mg total) by mouth.      dicyclomine 20 MG Oral Tab Take 1 tablet (20 mg total) by mouth 4 (four) times daily before meals and nightly. 21 tablet 0    Dulaglutide (TRULICITY) 4.5 MG/0.5ML Subcutaneous Solution Pen-injector Inject 4.5 mg into the skin once a week. 6 mL 0    diclofenac 75 MG Oral Tab EC 1 tab PO BID x10 days, then 1 tab PO qDaily PRN pain. Take with food. 40 tablet 0    metFORMIN  MG Oral Tablet 24 Hr Take 1 tablet (750 mg total) by mouth daily. 90 tablet 0    levothyroxine 50 MCG Oral Tab Take 1 tablet (50 mcg total) by mouth before breakfast. 90 tablet 0     Allergies[1]  Current Outpatient Medications   Medication Sig Dispense Refill    magnesium 30 MG Oral Tab Take 1 tablet (30 mg total) by mouth daily with breakfast.      Omega-3 Fatty Acids (FISH OIL) 300 MG Oral Cap Take by mouth.      ATORVASTATIN 10 MG Oral Tab TAKE 1 TABLET BY MOUTH NIGHTLY. 90 tablet 0    ondansetron 4 MG Oral Tablet Dispersible Take 1 tablet (4 mg total) by  mouth.      dicyclomine 20 MG Oral Tab Take 1 tablet (20 mg total) by mouth 4 (four) times daily before meals and nightly. 21 tablet 0    Dulaglutide (TRULICITY) 4.5 MG/0.5ML Subcutaneous Solution Pen-injector Inject 4.5 mg into the skin once a week. 6 mL 0    diclofenac 75 MG Oral Tab EC 1 tab PO BID x10 days, then 1 tab PO qDaily PRN pain. Take with food. 40 tablet 0    metFORMIN  MG Oral Tablet 24 Hr Take 1 tablet (750 mg total) by mouth daily. 90 tablet 0    levothyroxine 50 MCG Oral Tab Take 1 tablet (50 mcg total) by mouth before breakfast. 90 tablet 0     Past Medical History:    Diabetes (HCC)    Diabetes mellitus (HCC)    Leukocytosis     Family History   Problem Relation Age of Onset    Heart Disorder Father     Other (Hypothyroidism) Father     Diabetes Mother     Cancer Mother 67        lymphoma    No Known Problems Daughter     No Known Problems Son     Diabetes Maternal Grandmother     Heart Disorder Maternal Grandmother     Diabetes Maternal Grandfather     Heart Disorder Maternal Grandfather     Diabetes Paternal Grandmother     Heart Disorder Paternal Grandmother     Diabetes Paternal Grandfather     Heart Disorder Paternal Grandfather      Social history: Reviewed.      ROS/Exam    REVIEW OF SYSTEMS: Ten point review of systems has been performed and is otherwise negative and/or non-contributory, except as described above.     VITALS  Vitals:    11/08/24 0932   BP: 128/74   Pulse: 115   SpO2: 98%   Weight: 168 lb (76.2 kg)   Height: 5' (1.524 m)       Wt Readings from Last 6 Encounters:   11/08/24 168 lb (76.2 kg)   07/17/24 167 lb (75.8 kg)   05/01/24 173 lb (78.5 kg)   04/19/24 173 lb (78.5 kg)   02/20/24 173 lb (78.5 kg)   01/29/24 178 lb (80.7 kg)       PHYSICAL EXAM  CONSTITUTIONAL:  awake, alert, cooperative, no apparent distress, and appears stated age Vss stable   PSYCH: normal affect  LUNGS: breathing comfortably  CARDIOVASCULAR:  regular rate   NECK:  no palpable thyroid nodules      Labs/Imaging:   Lab Results   Component Value Date    CHOLEST 142 10/03/2024    CHOLEST 137 02/19/2024    TRIG 111 10/03/2024    TRIG 64 02/19/2024    HDL 48 10/03/2024    HDL 45 02/19/2024    LDL 74 10/03/2024    LDL 79 02/19/2024     Lab Results   Component Value Date    MICROALBCREA 7.8 05/31/2024    MICROALBCREA 21.2 04/27/2023      Lab Results   Component Value Date    CREATSERUM 0.84 10/03/2024    CREATSERUM 0.84 05/31/2024    EGFRCR 88 10/03/2024    EGFRCR 88 05/31/2024     Lab Results   Component Value Date    AST 22 10/03/2024    AST 12 (L) 05/31/2024    ALT 25 10/03/2024    ALT 33 05/31/2024       Lab Results   Component Value Date    TSH 3.801 07/17/2024    TSH 1.790 02/19/2024    T4F 1.4 07/17/2024         Overall glucose control:  Lab Results   Component Value Date    A1C 6.9 (H) 10/03/2024    A1C 6.2 (H) 05/31/2024    A1C 6.6 (H) 02/19/2024    A1C 7.3 (H) 01/10/2024    A1C 7.2 (H) 11/29/2023       Supplementary Documentation:   -Surveillance for Diabetes Complications & Risks  Foot exam/neuropathy: Last Foot Exam: 07/17/24    Retinopathy screening: No data recordedNo data recorded    Assessment & Plan:     ICD-10-CM    1. Type 2 diabetes mellitus with obesity (HCC)  E11.69 Vitamin B12    E66.9       2. Hyperlipidemia, unspecified hyperlipidemia type  E78.5       3. Vitamin D deficiency  E55.9 TSH and Free T4     Vitamin D      4. Acquired hypothyroidism  E03.9       5. BMI 32.0-32.9,adult  Z68.32           Zenobia Mccoy is a pleasant 44 year old female here for evaluation of:    #Diabetes  PMHx of Type 2 diabetes mellitus diagnosed in n age 32 while she was pregnant with her first child.  Last A1c value was 6.9% done 10/3/2024. At goal  Goal <7%. Importance of better glucose control in preventing onset/progression of end-organ damage discussed, as well as goals of therapy and clinical significance of A1C.  - checking Vit B12     Plan:  - med changes:  - Continue Metformin 750 mg per  day  Continue Trulicity 4.5 mg weekly    If your fasting is above 130 most of the time or above 180 2 hrs after dinner, increase Metformin from 750 mg 1 tab daily to 2 tabs daily.   - Do not forget to take your Metformin  - Recommended to patient to see ophthalmology, given the patient list of ophthalmology    - continue to check BG 1-2x/day using glucometer or CGM  - Discussed management of low glucose and targeted glucose levels and provided instructions in AVS   -See above header \"Supplementary Documentation\" for surveillance for diabetes complications & risks    Nephropathy screening:   Lab Results   Component Value Date    EGFRCR 88 10/03/2024    MICROALBCREA 7.8 05/31/2024      Blood pressure control: - SBP is to goal <130   BP Readings from Last 1 Encounters:   11/08/24 128/74   BP Meds:      #Hyperlipidemia  Lipids: LDL is not to goal   Lab Results   Component Value Date    LDL 74 10/03/2024    TRIG 111 10/03/2024   Cholesterol Meds: atorvastatin Tabs - 10 MG - about once a week forget to take it.    Taking fish oil over the counter. Discussed to be adherent with her statin use and its rationale.     #Hypothyroidism  - denies hx of neck surgery or  neck radiation, states she was raised in [place] and started levothyroxine at age 42. Recent tsh normal, no adjustment made recently.   - discussed - Continue taking levothyroxine 50 mcg /day, take levothyroxine, in an empty stomach (before bfast or at least 2-3 hrs after dinner), separate from food and medicine, at least 1 hr apart and at least 4 hrs apart from supplements, Calcium/tums, Vit D, iron  - checking tfts prior next follow-up.    #Vitamin D deficiency  - checking level  - start taking Vitamin d over the counter at least [id number] int unit per day, take it 4 hrs apart from your levothyroxine    #BMI 32  - discussed Balanced diet: carbohydrates, protein, healthy fats and fiber in each meal. Exercise of at least 150 mins each week. Decrease your simple  carbohydrates intake such as sweets: cookies, soda, candy, white rice, white pasta, syrups    Return in about 3 months (around 2/8/2025) for diabetes follow up, thyroid follow up.    [facility] New  Endocrinology, Diabetes & Metabolism   11/8/24    Note to patient: The 21 Century Cures Act makes medical notes like these available to patients in the interest of transparency. However, be advised this is a medical document. It is intended as peer to peer communication. It is written in medical language and may contain abbreviations or verbiage that are unfamiliar. It may appear blunt or direct. Medical documents are intended to carry relevant information, facts as evident, and the clinical opinion of the practitioner.         [1] No Known Allergies

## 2024-11-08 NOTE — PATIENT INSTRUCTIONS
Return Visit:  APN: Return in about 3 months (around 2/8/2025) for diabetes follow up, thyroid follow up.  [] Video visit  [x] In person only    [x]  Directions to 1st floor lab    []  Give blood sugar log  []  PAP paperwork for Ozempic/Jardiance (english/Sami)     Summary of today's visit:  Medication changes:    Continue Metformin 750 mg per day  Continue Trulicity 4.5 mg weekly    If your fasting is above 130 most of the time or above 180 2 hrs after dinner, increase Metformin from 750 mg 1 tab daily to 2 tabs daily.   - start taking Vitamin d over the counter at least 0598-0979 int unit per day, take it 4 hrs apart from your levothyroxine  - Continue taking levothyroxine 50 mcg /day, take levothyroxine, in an empty stomach (before bfast or at least 2-3 hrs after dinner), separate from food and medicine, at least 1 hr apart and at least 4 hrs apart from supplements, Calcium/tums, Vit D, iron  - Do not forget to take your Metformin  - Schedule eye exam prior to your follow up visit  Ophthalmologist for diabetes eye exam:  [facility]: Dr Brianna King- [address], [place] [address]- PHONE: [phone]  [facility]: [phone]  [place]: Dr. Oppenheim- [phone]  [place]: [facility]: Phone: [phone], Address: [address] [place] [address]  [facility](accepts MEDICAID)- [id number], [facility], [place]- [phone]- [place], [employer], or [employer]  [facility]: [facility] - [phone]  [place]: Alexis Jones- [employer]- they can do screenings using their machine  [place]: [facility]- [phone]   [place]: Angelica Dominguez MD -  [phone]    -  Targeted fasting glucose (at least 8-10 hrs of no food/sweetened beverage intake)< 130 and 2 hrs after lunch or dinner< 180  and prior lunch or dinner <140  -  Follow 15g/15 min rule if you develop any hypoglycemia  symptoms w/ glucose <70  but if glucose <55 follow 30g/15 min rule. Once glucose above 80, eat a protein or food w protein.   - decrease saturated fat, trans fat, and cholesterol intake  example: Butter, lard, shortening, coconut, coconut oil, palm oil, fried food, bologna,pepperoni, salami, egg yolks, Poultry skin, visible meat fat.  - Increase non-starchy fiber intake: aspararagus, carrots, broccoli, [place] sprouts, baby corn, eggplant, cabbage, tomatoes, salad greens      Additional comments:   - If labs were ordered today, please complete prior to your follow up visit   - Please let us know if you require any refills at least 1 week prior to your medication running out   - Please call our office if sugars at home are consistently greater than 250 or less than 70     HOW TO TREAT LOW BLOOD SUGAR (Hypoglycemia)  Low blood sugar= Less than 70, or if you start to have symptoms (below)  Symptoms: Shaking or trembling, fast heart rate, extreme hunger, sweating, confusion/difficulty concentrating, dizziness.    How to treat a low blood sugar if you are able to eat/drink: The Rule of 15/15  If you are using continuous glucose monitor that says you are low, but you do not have any symptoms, verify on fingerstick that your blood sugar is actually low before treating.   Eat 15 grams of carbs (see examples below)  Check your blood sugar after 15 minutes. If it’s still below your target range, have another serving.   Repeat these steps until it’s in your target range. Once it’s in range, if you're nervous about your sugar going low again, have a protein source (ie, a spoonful of peanut butter).   If you have a CGM you want to look for how your arrow has changed. If you arrow is pointed up or sideways after 15 min, give your CGM more time OR check with a finger stick. Try not to eat more food until at least 15 min after the first BG check - otherwise you risk having a rebound high.  If you are experiencing symptoms and you  are unable to check your blood glucose for any reason, treat the hypoglycemia.  If someone has a low blood sugar and is unconscious: Don’t hesitate to call 911. If someone is unconscious and glucagon is not available or someone does not know how to use it, call 911 immediately.     To treat a low, I recommend you carry with you easy, pre-portioned treatment for low blood sugars that are 15G of carbs:   - Children sized squeeze pouch applesauce (high fiber + carbs help prevent too high of a spike)  - Small children's sized juicebox- 15g carb --> 4oz juice box  - Glucose tablets from [employer], you can find them near diabetes supplies --> Note, you will need to eat 3-4 tablets to get to 15g of carbs  - Children sized fruit snack pack- look for one with 15 grams of total carbohydrate  - Choice of how to treat your low is important. Complex carbs, or foods that contain fats along with carbs (like chocolate) can slow the absorption of glucose and should NOT be used to treat an emergency low

## 2024-11-11 ENCOUNTER — TELEPHONE (OUTPATIENT)
Dept: FAMILY MEDICINE CLINIC | Facility: CLINIC | Age: 44
End: 2024-11-11

## 2024-11-11 DIAGNOSIS — E66.9 OBESITY (BMI 30-39.9): ICD-10-CM

## 2024-11-11 DIAGNOSIS — E11.69 TYPE 2 DIABETES MELLITUS WITH HYPERCHOLESTEROLEMIA (HCC): ICD-10-CM

## 2024-11-11 DIAGNOSIS — E78.00 TYPE 2 DIABETES MELLITUS WITH HYPERCHOLESTEROLEMIA (HCC): ICD-10-CM

## 2024-11-11 NOTE — TELEPHONE ENCOUNTER
Repeat bilirubin due  Mychart message sent  Future Appointments   Date Time Provider Department Center   1/2/2025  1:15 PM Stacia Larkin APRN [facility]   2/5/2025  9:30 AM Roberta Molina APRN [facility]

## 2024-11-12 NOTE — TELEPHONE ENCOUNTER
Diabetes Medication Protocol [id number]/11/2024 09:56 PM   Protocol Details Last A1C < 7.5 and within past 6 months    In person appointment or virtual visit in the past 6 mos or appointment in next 3 mos    Microalbumin procedure in past 12 months or taking ACE/ARB    EGFRCR or GFRNAA > 50    GFR in the past 12 months        Request for  TRULICITY 4.5 MG/0.5ML Subcutaneous Solution Auto-injector     LOV 8/14/24 Maureen Leyva APRN   Last refill 5/30/24 6 ml 0 refill   Future Appointments   Date Time Provider Department Center   1/2/2025  1:15 PM Stacia Larkin APRN [facility]   2/5/2025  9:30 AM Roberta Molina APRN [facility] [place]   Labs 10/3/24

## 2024-11-13 RX ORDER — DULAGLUTIDE 4.5 MG/.5ML
4.5 INJECTION, SOLUTION SUBCUTANEOUS
Qty: 6 ML | Refills: 0 | Status: SHIPPED | OUTPATIENT
Start: 2024-11-13

## 2024-11-14 ENCOUNTER — LAB ENCOUNTER (OUTPATIENT)
Dept: LAB | Age: 44
End: 2024-11-14
Attending: NURSE PRACTITIONER
Payer: MEDICAID

## 2024-11-14 ENCOUNTER — TELEPHONE (OUTPATIENT)
Dept: FAMILY MEDICINE CLINIC | Facility: CLINIC | Age: 44
End: 2024-11-14

## 2024-11-14 DIAGNOSIS — R17 ELEVATED BILIRUBIN: ICD-10-CM

## 2024-11-14 LAB — BILIRUB SERPL-MCNC: 1.6 MG/DL (ref 0.3–1.2)

## 2024-11-14 PROCEDURE — 82247 BILIRUBIN TOTAL: CPT

## 2024-11-14 PROCEDURE — 36415 COLL VENOUS BLD VENIPUNCTURE: CPT

## 2024-11-14 NOTE — TELEPHONE ENCOUNTER
----- Message from Maureen Leyva sent at 11/14/2024  1:18 PM CST -----  Results reviewed.   Improved but remains elevated  Discuss with GI. Refer to Dr. Valentin

## 2024-11-19 ENCOUNTER — TELEPHONE (OUTPATIENT)
Dept: DERMATOLOGY | Age: 44
End: 2024-11-19

## 2024-11-19 ENCOUNTER — APPOINTMENT (OUTPATIENT)
Dept: DERMATOLOGY | Age: 44
End: 2024-11-19

## 2024-11-19 DIAGNOSIS — L71.9 ROSACEA: Primary | ICD-10-CM

## 2024-11-19 PROCEDURE — X17110 COSM DESTRUCTION BENIGN LESIONS (OTHER THAN SKIN TAGS) 1-14 LESION: Performed by: DERMATOLOGY

## 2024-11-20 DIAGNOSIS — E03.9 HYPOTHYROIDISM, UNSPECIFIED TYPE: ICD-10-CM

## 2024-11-20 RX ORDER — LEVOTHYROXINE SODIUM 50 UG/1
50 TABLET ORAL
Qty: 90 TABLET | Refills: 0 | Status: SHIPPED | OUTPATIENT
Start: 2024-11-20

## 2024-11-20 NOTE — TELEPHONE ENCOUNTER
Thyroid Medication Protocol Passed      Last refill 2/21/24 90 0 refill  LEVOTHYROXIN 50 MCG TAB [id number]/[id number] Maureen Lagos, [id number] - [place]...

## 2024-12-18 ENCOUNTER — PATIENT MESSAGE (OUTPATIENT)
Dept: FAMILY MEDICINE CLINIC | Facility: CLINIC | Age: 44
End: 2024-12-18

## 2024-12-18 DIAGNOSIS — E78.00 TYPE 2 DIABETES MELLITUS WITH HYPERCHOLESTEROLEMIA (HCC): Primary | ICD-10-CM

## 2024-12-18 DIAGNOSIS — E11.69 TYPE 2 DIABETES MELLITUS WITH HYPERCHOLESTEROLEMIA (HCC): Primary | ICD-10-CM

## 2024-12-27 ENCOUNTER — TELEPHONE (OUTPATIENT)
Age: 44
End: 2024-12-27

## 2024-12-27 NOTE — TELEPHONE ENCOUNTER
Referral placed and faxed to  [facility] in [place]. Their phone number is [phone].  [facility] fax [phone].  Or [phone]

## 2025-01-08 ENCOUNTER — TELEPHONE (OUTPATIENT)
Dept: FAMILY MEDICINE CLINIC | Facility: CLINIC | Age: 45
End: 2025-01-08

## 2025-01-13 ENCOUNTER — PATIENT MESSAGE (OUTPATIENT)
Dept: FAMILY MEDICINE CLINIC | Facility: CLINIC | Age: 45
End: 2025-01-13

## 2025-01-13 DIAGNOSIS — E66.9 OBESITY (BMI 30-39.9): ICD-10-CM

## 2025-01-13 RX ORDER — PHENTERMINE HYDROCHLORIDE 15 MG/1
15 CAPSULE ORAL EVERY MORNING
Qty: 30 CAPSULE | Refills: 0
Start: 2025-01-13 | End: 2025-02-12

## 2025-01-13 NOTE — TELEPHONE ENCOUNTER
Last office visit with IRMA Uribe on 8/14/2024    Last prescribed Phentermine on 9/16/2024 quantity 30

## 2025-01-22 ENCOUNTER — OFFICE VISIT (OUTPATIENT)
Dept: FAMILY MEDICINE CLINIC | Facility: CLINIC | Age: 45
End: 2025-01-22
Payer: MEDICAID

## 2025-01-22 ENCOUNTER — LAB ENCOUNTER (OUTPATIENT)
Dept: LAB | Age: 45
End: 2025-01-22
Attending: NURSE PRACTITIONER
Payer: MEDICAID

## 2025-01-22 VITALS
BODY MASS INDEX: 34.09 KG/M2 | HEIGHT: 60 IN | WEIGHT: 173.63 LBS | OXYGEN SATURATION: 98 % | SYSTOLIC BLOOD PRESSURE: 124 MMHG | HEART RATE: 110 BPM | TEMPERATURE: 97 F | DIASTOLIC BLOOD PRESSURE: 78 MMHG

## 2025-01-22 DIAGNOSIS — E66.9 OBESITY (BMI 30-39.9): ICD-10-CM

## 2025-01-22 DIAGNOSIS — E11.69 TYPE 2 DIABETES MELLITUS WITH HYPERCHOLESTEROLEMIA (HCC): ICD-10-CM

## 2025-01-22 DIAGNOSIS — E78.00 TYPE 2 DIABETES MELLITUS WITH HYPERCHOLESTEROLEMIA (HCC): ICD-10-CM

## 2025-01-22 DIAGNOSIS — J01.10 ACUTE NON-RECURRENT FRONTAL SINUSITIS: ICD-10-CM

## 2025-01-22 DIAGNOSIS — R17 ELEVATED BILIRUBIN: ICD-10-CM

## 2025-01-22 DIAGNOSIS — E66.811 CLASS 1 OBESITY DUE TO EXCESS CALORIES WITH SERIOUS COMORBIDITY AND BODY MASS INDEX (BMI) OF 33.0 TO 33.9 IN ADULT: ICD-10-CM

## 2025-01-22 DIAGNOSIS — E03.9 ACQUIRED HYPOTHYROIDISM: ICD-10-CM

## 2025-01-22 DIAGNOSIS — E55.9 VITAMIN D DEFICIENCY: ICD-10-CM

## 2025-01-22 DIAGNOSIS — E11.69 TYPE 2 DIABETES MELLITUS WITH OBESITY (HCC): ICD-10-CM

## 2025-01-22 DIAGNOSIS — E66.09 CLASS 1 OBESITY DUE TO EXCESS CALORIES WITH SERIOUS COMORBIDITY AND BODY MASS INDEX (BMI) OF 33.0 TO 33.9 IN ADULT: ICD-10-CM

## 2025-01-22 DIAGNOSIS — E66.9 TYPE 2 DIABETES MELLITUS WITH OBESITY (HCC): ICD-10-CM

## 2025-01-22 DIAGNOSIS — E66.9 TYPE 2 DIABETES MELLITUS WITH OBESITY (HCC): Primary | ICD-10-CM

## 2025-01-22 DIAGNOSIS — E78.2 MIXED HYPERLIPIDEMIA: ICD-10-CM

## 2025-01-22 DIAGNOSIS — E11.69 TYPE 2 DIABETES MELLITUS WITH OBESITY (HCC): Primary | ICD-10-CM

## 2025-01-22 LAB
ALBUMIN SERPL-MCNC: 4.5 G/DL (ref 3.2–4.8)
ALBUMIN/GLOB SERPL: 1.5 {RATIO} (ref 1–2)
ALP LIVER SERPL-CCNC: 52 U/L
ALT SERPL-CCNC: 25 U/L
ANION GAP SERPL CALC-SCNC: 8 MMOL/L (ref 0–18)
AST SERPL-CCNC: 23 U/L (ref ?–34)
BILIRUB SERPL-MCNC: 1 MG/DL (ref 0.3–1.2)
BUN BLD-MCNC: 16 MG/DL (ref 9–23)
CALCIUM BLD-MCNC: 9.5 MG/DL (ref 8.7–10.6)
CHLORIDE SERPL-SCNC: 104 MMOL/L (ref 98–112)
CHOLEST SERPL-MCNC: 134 MG/DL (ref ?–200)
CO2 SERPL-SCNC: 29 MMOL/L (ref 21–32)
CREAT BLD-MCNC: 0.77 MG/DL
CREAT UR-SCNC: 164.5 MG/DL
EGFRCR SERPLBLD CKD-EPI 2021: 97 ML/MIN/1.73M2 (ref 60–?)
FASTING PATIENT LIPID ANSWER: NO
FASTING STATUS PATIENT QL REPORTED: NO
GLOBULIN PLAS-MCNC: 3 G/DL (ref 2–3.5)
GLUCOSE BLD-MCNC: 122 MG/DL (ref 70–99)
HDLC SERPL-MCNC: 42 MG/DL (ref 40–59)
HEMOGLOBIN A1C: 6.7 % (ref 4.3–5.6)
LDLC SERPL CALC-MCNC: 70 MG/DL (ref ?–100)
MICROALBUMIN UR-MCNC: 0.9 MG/DL
MICROALBUMIN/CREAT 24H UR-RTO: 5.5 UG/MG (ref ?–30)
NONHDLC SERPL-MCNC: 92 MG/DL (ref ?–130)
OSMOLALITY SERPL CALC.SUM OF ELEC: 294 MOSM/KG (ref 275–295)
POTASSIUM SERPL-SCNC: 4.3 MMOL/L (ref 3.5–5.1)
PROT SERPL-MCNC: 7.5 G/DL (ref 5.7–8.2)
SODIUM SERPL-SCNC: 141 MMOL/L (ref 136–145)
T4 FREE SERPL-MCNC: 1.5 NG/DL (ref 0.8–1.7)
TRIGL SERPL-MCNC: 124 MG/DL (ref 30–149)
TSI SER-ACNC: 1.89 UIU/ML (ref 0.55–4.78)
VIT D+METAB SERPL-MCNC: 30 NG/ML (ref 30–100)
VLDLC SERPL CALC-MCNC: 19 MG/DL (ref 0–30)

## 2025-01-22 PROCEDURE — 83036 HEMOGLOBIN GLYCOSYLATED A1C: CPT | Performed by: NURSE PRACTITIONER

## 2025-01-22 PROCEDURE — 84439 ASSAY OF FREE THYROXINE: CPT

## 2025-01-22 PROCEDURE — 80061 LIPID PANEL: CPT

## 2025-01-22 PROCEDURE — 36415 COLL VENOUS BLD VENIPUNCTURE: CPT

## 2025-01-22 PROCEDURE — 82570 ASSAY OF URINE CREATININE: CPT | Performed by: NURSE PRACTITIONER

## 2025-01-22 PROCEDURE — 99215 OFFICE O/P EST HI 40 MIN: CPT | Performed by: NURSE PRACTITIONER

## 2025-01-22 PROCEDURE — 82043 UR ALBUMIN QUANTITATIVE: CPT | Performed by: NURSE PRACTITIONER

## 2025-01-22 PROCEDURE — 82306 VITAMIN D 25 HYDROXY: CPT

## 2025-01-22 PROCEDURE — 84443 ASSAY THYROID STIM HORMONE: CPT

## 2025-01-22 PROCEDURE — 80053 COMPREHEN METABOLIC PANEL: CPT

## 2025-01-22 RX ORDER — DULAGLUTIDE 4.5 MG/.5ML
4.5 INJECTION, SOLUTION SUBCUTANEOUS WEEKLY
Qty: 6 ML | Refills: 1 | Status: SHIPPED | OUTPATIENT
Start: 2025-01-22 | End: 2025-04-22

## 2025-01-22 RX ORDER — PHENTERMINE HYDROCHLORIDE 15 MG/1
15 CAPSULE ORAL EVERY MORNING
Qty: 30 CAPSULE | Refills: 0 | Status: SHIPPED | OUTPATIENT
Start: 2025-01-22

## 2025-01-22 RX ORDER — ATORVASTATIN CALCIUM 10 MG/1
10 TABLET, FILM COATED ORAL NIGHTLY
Qty: 90 TABLET | Refills: 1 | Status: SHIPPED | OUTPATIENT
Start: 2025-01-22

## 2025-01-22 RX ORDER — PILOCARPINE HYDROCHLORIDE 10 MG/ML
1 SOLUTION/ DROPS OPHTHALMIC 2 TIMES DAILY
COMMUNITY
Start: 2025-01-14

## 2025-01-22 NOTE — PROGRESS NOTES
HPI:     Patient presents today for diabetes follow up and medication refill. At her eye exam recently she reported changes in her vision. She reports Patient recently started Pilocarpine eye drops 2x/day. She is experiencing head pressure and eye pressure after using the eye drops. She presents with sinus congestion and sore throat that began this past Friday. She has taken Tylenol and cough drops. Denies fever, body aches, or night sweats.    Current Weight: 168 lb   Body mass index is 33.9 kg/m².  Current DM Medications: Trulicity 4.5 mg weekly, Metformin 750 mg  Medication Side Effects: none  ACE/ARB: None  Statin: Atorvastatin   Diabetic Eye Exam: 1/2025  Last Diabetic Foot Exam: 7/2024  Urine Micro: 5/2024  Other new issues or concerns today:    Eye issues. Recently saw eye doctor on 1/8/2025. No diabetic retinopathy. Was started on eye drops. Has been using them for 2 weeks. Having a lot of pressure behind eyes.  Sinus congestion. Started 6 days ago and is worsening  Struggling to lose weight. Despite being on GLP-1 she is not losing weight. Has been on Phentermine    Lab Results   Component Value Date    A1C 6.7 (A) 01/22/2025    A1C 6.9 (H) 10/03/2024    A1C 6.2 (H) 05/31/2024    A1C 6.6 (H) 02/19/2024    A1C 7.3 (H) 01/10/2024       Recent Results (from the past 4380 hours)   Comp Metabolic Panel (14) [E]    Collection Time: 10/03/24  9:03 AM   Result Value Ref Range    Glucose 117 (H) 70 - 99 mg/dL    Sodium 136 136 - 145 mmol/L    Potassium 4.6 3.5 - 5.1 mmol/L    Chloride 106 98 - 112 mmol/L    CO2 27.0 21.0 - 32.0 mmol/L    Anion Gap 3 0 - 18 mmol/L    BUN 15 9 - 23 mg/dL    Creatinine 0.84 0.55 - 1.02 mg/dL    Calcium, Total 10.0 8.7 - 10.4 mg/dL    Calculated Osmolality 284 275 - 295 mOsm/kg    eGFR-Cr 88 >=60 mL/min/1.73m2    AST 22 <34 U/L    ALT 25 10 - 49 U/L    Alkaline Phosphatase 47 37 - 98 U/L    Bilirubin, Total 2.3 (H) 0.3 - 1.2 mg/dL    Total Protein 7.6 5.7 - 8.2 g/dL    Albumin 4.4 3.2 -  4.8 g/dL    Globulin  3.2 2.0 - 3.5 g/dL    A/G Ratio 1.4 1.0 - 2.0    Patient Fasting for CMP? Yes       Cholesterol: 142, done on 10/3/2024.  HDL Cholesterol: 48, done on 10/3/2024.  LDL Cholesterol: 74, done on 10/3/2024.  TriGlycerides 111, done on 10/3/2024.       Current Outpatient Medications   Medication Sig Dispense Refill    pilocarpine 1 % Ophthalmic Solution Place 1 drop into both eyes in the morning and 1 drop before bedtime.      Phentermine HCl 15 MG Oral Cap Take 1 capsule (15 mg total) by mouth every morning. 30 capsule 0    amoxicillin clavulanate 875-125 MG Oral Tab Take 1 tablet by mouth 2 (two) times daily for 10 days. 20 tablet 0    atorvastatin 10 MG Oral Tab Take 1 tablet (10 mg total) by mouth nightly. 90 tablet 1    Dulaglutide (TRULICITY) 4.5 MG/0.5ML Subcutaneous Solution Auto-injector Inject 4.5 mg into the skin once a week. 6 mL 1    LEVOTHYROXINE 50 MCG Oral Tab Take 1 tablet (50 mcg total) by mouth before breakfast. 90 tablet 0    magnesium 30 MG Oral Tab Take 1 tablet (30 mg total) by mouth daily with breakfast.      Omega-3 Fatty Acids (FISH OIL) 300 MG Oral Cap Take by mouth.      metFORMIN  MG Oral Tablet 24 Hr Take 1 tablet (750 mg total) by mouth daily. 90 tablet 0      Past Medical History:    Diabetes (HCC)    Diabetes mellitus (HCC)    Leukocytosis      Past Surgical History:   Procedure Laterality Date      2013    Surgery - external      Tummy tuck    Tubal ligation        Family History   Problem Relation Age of Onset    Heart Disorder Father     Other (Hypothyroidism) Father     Diabetes Mother     Cancer Mother 67        lymphoma    No Known Problems Daughter     No Known Problems Son     Diabetes Maternal Grandmother     Heart Disorder Maternal Grandmother     Diabetes Maternal Grandfather     Heart Disorder Maternal Grandfather     Diabetes Paternal Grandmother     Heart Disorder Paternal Grandmother     Diabetes Paternal Grandfather     Heart  Disorder Paternal Grandfather       Social History     Socioeconomic History    Marital status:    Tobacco Use    Smoking status: Never    Smokeless tobacco: Never   Vaping Use    Vaping status: Never Used   Substance and Sexual Activity    Alcohol use: Not Currently     Comment: Rarely    Drug use: No   Other Topics Concern    Caffeine Concern No     Comment: coffee ocassionally    Exercise No    Seat Belt Yes   Social History Narrative    ** Merged History Encounter **          Social Drivers of Health      Received from [facility]    Housing Stability         REVIEW OF SYSTEMS:   Review of Systems   Constitutional:  Positive for unexpected weight change (gaining weight). Negative for chills, fatigue and fever.   HENT:  Positive for postnasal drip, rhinorrhea and sore throat. Negative for congestion, ear pain and trouble swallowing.    Eyes:  Negative for visual disturbance.        No changes to vision from previous visit. Does report vision changes that have been ongoing.   Respiratory:  Negative for cough and shortness of breath.    Cardiovascular:  Negative for chest pain and palpitations.   Gastrointestinal:  Negative for abdominal pain, blood in stool, constipation, diarrhea, nausea and vomiting.   Endocrine: Negative for cold intolerance, heat intolerance, polydipsia, polyphagia and polyuria.   Genitourinary:  Negative for dysuria, frequency, hematuria and pelvic pain.   Musculoskeletal:  Negative for back pain.   Skin:  Negative for rash.   Neurological:  Negative for headaches.   Hematological:  Does not bruise/bleed easily.   Psychiatric/Behavioral:  Negative for dysphoric mood and sleep disturbance. The patient is not nervous/anxious.        EXAM:   /78 (Patient Position: Sitting, Cuff Size: adult)   Pulse 110   Temp 97 °F (36.1 °C) (Temporal)   Ht 5' (1.524 m)   Wt 173 lb 9.6 oz (78.7 kg)   LMP 01/17/2025 (Exact Date)   SpO2 98%   BMI 33.90 kg/m²   Physical  Exam  Constitutional:       Appearance: She is obese. She is ill-appearing.   HENT:      Right Ear: Tympanic membrane, ear canal and external ear normal.      Left Ear: Tympanic membrane, ear canal and external ear normal.      Nose: Mucosal edema and rhinorrhea present. Rhinorrhea is purulent.      Right Sinus: Frontal sinus tenderness present.      Left Sinus: Frontal sinus tenderness present.   Eyes:      General: Lids are normal. Vision grossly intact. No allergic shiner.     Extraocular Movements: Extraocular movements intact.      Conjunctiva/sclera: Conjunctivae normal.   Cardiovascular:      Rate and Rhythm: Normal rate and regular rhythm.      Heart sounds: Normal heart sounds.   Pulmonary:      Effort: Pulmonary effort is normal.      Breath sounds: Normal breath sounds.   Neurological:      General: No focal deficit present.      Mental Status: She is alert.   Psychiatric:         Mood and Affect: Mood normal.         ASSESSMENT AND PLAN:   Diagnoses and all orders for this visit:    Type 2 diabetes mellitus with obesity (HCC)  -     POC Hemoglobin A1C  -     Microalb/Creat Ratio, Random Urine [E]; Future  -     Comp Metabolic Panel (14) [E]; Future  -     Lipid Panel [E]; Future  -     atorvastatin 10 MG Oral Tab; Take 1 tablet (10 mg total) by mouth nightly.  -     Dulaglutide (TRULICITY) 4.5 MG/0.5ML Subcutaneous Solution Auto-injector; Inject 4.5 mg into the skin once a week.    Type 2 diabetes mellitus with hypercholesterolemia (HCC)    Mixed hyperlipidemia  -     Comp Metabolic Panel (14) [E]; Future  -     Lipid Panel [E]; Future    Vitamin D deficiency  -     Vitamin D [E]; Future    Acquired hypothyroidism  -     TSH and Free T4 [E]; Future    Elevated bilirubin  -     Comp Metabolic Panel (14) [E]; Future    Acute non-recurrent frontal sinusitis  -     amoxicillin clavulanate 875-125 MG Oral Tab; Take 1 tablet by mouth 2 (two) times daily for 10 days.    Class 1 obesity due to excess calories  with serious comorbidity and body mass index (BMI) of 33.0 to 33.9 in adult  -     Phentermine HCl 15 MG Oral Cap; Take 1 capsule (15 mg total) by mouth every morning.  -     Dulaglutide (TRULICITY) 4.5 MG/0.5ML Subcutaneous Solution Auto-injector; Inject 4.5 mg into the skin once a week.    Obesity (BMI 30-39.9)    Mild improvement in A1C. Continue Trulicity. Seeing endo next month  Additional lab work to be done today  Resume Phentermine, follow up with progress in 1 month. If doing well, will increase  Antibiotic sent for sinus infection. Supportive care discussed

## 2025-01-23 ENCOUNTER — TELEPHONE (OUTPATIENT)
Dept: FAMILY MEDICINE CLINIC | Facility: CLINIC | Age: 45
End: 2025-01-23

## 2025-01-23 NOTE — TELEPHONE ENCOUNTER
----- Message from Maureen Leyva sent at 1/23/2025  8:56 AM CST -----  Results reviewed.   Urine micro normal

## 2025-01-23 NOTE — TELEPHONE ENCOUNTER
Well that's good news:) about Vitamin D i have been taking 2000 units daily since end of November.  Should I increase?

## 2025-01-23 NOTE — TELEPHONE ENCOUNTER
----- Message from Maureen Leyva sent at 1/23/2025  8:56 AM CST -----  Results reviewed.   Chemistries stable  Vitamin D low normal, recommend daily Vitamin D 2000 units daily  Cholesterol at goal  Thyroid function normal

## 2025-01-29 ENCOUNTER — APPOINTMENT (OUTPATIENT)
Dept: DERMATOLOGY | Age: 45
End: 2025-01-29

## 2025-02-05 ENCOUNTER — OFFICE VISIT (OUTPATIENT)
Facility: CLINIC | Age: 45
End: 2025-02-05
Payer: MEDICAID

## 2025-02-05 VITALS
SYSTOLIC BLOOD PRESSURE: 122 MMHG | WEIGHT: 175 LBS | OXYGEN SATURATION: 98 % | DIASTOLIC BLOOD PRESSURE: 76 MMHG | BODY MASS INDEX: 34 KG/M2 | HEART RATE: 110 BPM

## 2025-02-05 DIAGNOSIS — E03.9 ACQUIRED HYPOTHYROIDISM: ICD-10-CM

## 2025-02-05 DIAGNOSIS — E78.5 HYPERLIPIDEMIA ASSOCIATED WITH TYPE 2 DIABETES MELLITUS (HCC): ICD-10-CM

## 2025-02-05 DIAGNOSIS — E11.69 TYPE 2 DIABETES MELLITUS WITH OBESITY (HCC): Primary | ICD-10-CM

## 2025-02-05 DIAGNOSIS — E11.69 HYPERLIPIDEMIA ASSOCIATED WITH TYPE 2 DIABETES MELLITUS (HCC): ICD-10-CM

## 2025-02-05 DIAGNOSIS — E66.9 TYPE 2 DIABETES MELLITUS WITH OBESITY (HCC): Primary | ICD-10-CM

## 2025-02-05 DIAGNOSIS — E55.9 VITAMIN D DEFICIENCY: ICD-10-CM

## 2025-02-05 PROCEDURE — 99214 OFFICE O/P EST MOD 30 MIN: CPT | Performed by: NURSE PRACTITIONER

## 2025-02-05 RX ORDER — HYDROCHLOROTHIAZIDE 12.5 MG/1
1 CAPSULE ORAL
Qty: 2 EACH | Refills: 5 | Status: SHIPPED | OUTPATIENT
Start: 2025-02-05

## 2025-02-05 RX ORDER — ORAL SEMAGLUTIDE 3 MG/1
3 TABLET ORAL DAILY
Qty: 30 TABLET | Refills: 0 | Status: SHIPPED | OUTPATIENT
Start: 2025-02-05 | End: 2025-03-07

## 2025-02-05 RX ORDER — LANCETS 30 GAUGE
EACH MISCELLANEOUS
Qty: 100 EACH | Refills: 2 | Status: SHIPPED | OUTPATIENT
Start: 2025-02-05

## 2025-02-05 RX ORDER — KETOROLAC TROMETHAMINE 30 MG/ML
INJECTION, SOLUTION INTRAMUSCULAR; INTRAVENOUS
Qty: 1 EACH | Refills: 0 | Status: SHIPPED | OUTPATIENT
Start: 2025-02-05

## 2025-02-05 RX ORDER — METFORMIN HYDROCHLORIDE 750 MG/1
750 TABLET, EXTENDED RELEASE ORAL DAILY
Qty: 90 TABLET | Refills: 0 | Status: SHIPPED | OUTPATIENT
Start: 2025-02-05

## 2025-02-05 NOTE — PROGRESS NOTES
[facility] Note    Name: Zenobia Mccoy    Date: 25    Zenobia Mccoy is a 44 year old female who presents for evaluation of T2DM management.     Chief complaint: Follow - Up (Pt presents for DM follow up. Pt brought blood sugar log for review. )       Subjective:   DM hx:  -Diagnosed with diabetes in age 32 while she was pregnant with her first child  -Started insuln: age 32, was both on insulin for both pregnancy  -Family history- yes, mother and maternal grandparent    Initial HPI consult - 2024    DM meds at first office visit: trulicity 4.5 mg, Metformin 750 mg daily   --> states sometimes she forgets to take her metformin once a week. . She was not taking trulicity for at least 3 weeks last summer because of pharmacy does not have availability.     Blood Glucose log reviewed. Checking 1 times per day. Morning/fastin-161,( 10/20 fasting above 130)episodes of hypoglycemia: No    -Re: potential DM medication contraindication (if positive, checkbox selected):  [] History of pancreatitis- denies   [] Personal/fam hx of medullary thyroid cancer/MEN2-denies   [] History of recent/frequent UTI/yeast infxn-denies  [] Previous amputation related to diabetes-denies     -Presence of associated DM complications (if positive, checkbox selected):  [] Macrovascular complications (CAD/CVA/PAD)-denies   [] Neuropathy-sometimes cramps to legs   [] Retinopathy-denies   [] Nephropathy-denies   [] HTN-denies   [x] Hyperlipidemia  [] Stroke/TIA-denies   [] Gastroparesis-denies   [] Wound, ulcer or amputations-denies     - Lifestyle: likes to eat sweets, does not drink soda  - Modifying factors: sometimes forget to take her Metformin.   - Steroid use: Yes: about a month ago due to hip pain       Previously trialed/failed DM meds: insulin while she was on pregnant     #Hypothyroidism  - denies hx of neck surgery or  neck radiation   -states she was raised in [place], and states she and her  parents went to the doctor to get medicine. States she was about 3000 miles from the [place].   -states she started levothyroxine at age 42, , takes it appropriately and takes it in empty stomach.   -thyroid US last 2024 no nodules.   - currently taking 50 mcg of levothyroxine, not recently changed since age 42.     Interim Hx with Roberta APN 25  :   States she did not take trulicity for couple of weeks because the pharmacy run out of supply in the pharmacy   Current treatment: Metformin 750 mg per day, Trulicity 4.5 mg weekly   Testin-174 fasting levels, reviewed   Hypoglycemia: denies   New complication related to DM:   no hospitalization and no ER visit since last office visit.     History/Other:    Allergies, PMH, SocHx and FHx reviewed and updated as appropriate in Epic on    Continuous Glucose Sensor (FREESTYLE URSULA 3 PLUS SENSOR) Does not apply Misc 1 each by Other route every 15 (fifteen) days. 2 each 5    Continuous Glucose  (FREESTYLE URSULA 3 READER) Does not apply Device Use it to check glucose daily 1 each 0    Semaglutide (RYBELSUS) 3 MG Oral Tab Take 3 mg by mouth daily. 30 tablet 0    metFORMIN  MG Oral Tablet 24 Hr Take 1 tablet (750 mg total) by mouth daily. 90 tablet 0    Lancets Does not apply Misc Use to test BG 1x/day. 100 each 2    Glucose Blood (RELION GLUCOSE TEST STRIPS) In Vitro Strip Check glucose once a day 100 each 1    Cholecalciferol 125 MCG (5000 UT) Oral Tab Take 1 tablet (5,000 Units total) by mouth daily.      pilocarpine 1 % Ophthalmic Solution Place 1 drop into both eyes in the morning and 1 drop before bedtime.      Phentermine HCl 15 MG Oral Cap Take 1 capsule (15 mg total) by mouth every morning. 30 capsule 0    atorvastatin 10 MG Oral Tab Take 1 tablet (10 mg total) by mouth nightly. 90 tablet 1    Dulaglutide (TRULICITY) 4.5 MG/0.5ML Subcutaneous Solution Auto-injector Inject 4.5 mg into the skin once a week. 6 mL 1    LEVOTHYROXINE 50 MCG Oral  Tab Take 1 tablet (50 mcg total) by mouth before breakfast. 90 tablet 0    magnesium 30 MG Oral Tab Take 1 tablet (30 mg total) by mouth daily with breakfast.      Omega-3 Fatty Acids (FISH OIL) 300 MG Oral Cap Take by mouth.       Allergies[1]  Current Outpatient Medications   Medication Sig Dispense Refill    Continuous Glucose Sensor (FREESTYLE URSULA 3 PLUS SENSOR) Does not apply Misc 1 each by Other route every 15 (fifteen) days. 2 each 5    Continuous Glucose  (FREESTYLE URSULA 3 READER) Does not apply Device Use it to check glucose daily 1 each 0    Semaglutide (RYBELSUS) 3 MG Oral Tab Take 3 mg by mouth daily. 30 tablet 0    metFORMIN  MG Oral Tablet 24 Hr Take 1 tablet (750 mg total) by mouth daily. 90 tablet 0    Lancets Does not apply Misc Use to test BG 1x/day. 100 each 2    Glucose Blood (RELION GLUCOSE TEST STRIPS) In Vitro Strip Check glucose once a day 100 each 1    Cholecalciferol 125 MCG (5000 UT) Oral Tab Take 1 tablet (5,000 Units total) by mouth daily.      pilocarpine 1 % Ophthalmic Solution Place 1 drop into both eyes in the morning and 1 drop before bedtime.      Phentermine HCl 15 MG Oral Cap Take 1 capsule (15 mg total) by mouth every morning. 30 capsule 0    atorvastatin 10 MG Oral Tab Take 1 tablet (10 mg total) by mouth nightly. 90 tablet 1    Dulaglutide (TRULICITY) 4.5 MG/0.5ML Subcutaneous Solution Auto-injector Inject 4.5 mg into the skin once a week. 6 mL 1    LEVOTHYROXINE 50 MCG Oral Tab Take 1 tablet (50 mcg total) by mouth before breakfast. 90 tablet 0    magnesium 30 MG Oral Tab Take 1 tablet (30 mg total) by mouth daily with breakfast.      Omega-3 Fatty Acids (FISH OIL) 300 MG Oral Cap Take by mouth.       Past Medical History:    Diabetes (HCC)    Diabetes mellitus (HCC)    Leukocytosis     Family History   Problem Relation Age of Onset    Heart Disorder Father     Other (Hypothyroidism) Father     Diabetes Mother     Cancer Mother 67        lymphoma    No Known  Problems Daughter     No Known Problems Son     Diabetes Maternal Grandmother     Heart Disorder Maternal Grandmother     Diabetes Maternal Grandfather     Heart Disorder Maternal Grandfather     Diabetes Paternal Grandmother     Heart Disorder Paternal Grandmother     Diabetes Paternal Grandfather     Heart Disorder Paternal Grandfather      Social history: Reviewed.      ROS/Exam    REVIEW OF SYSTEMS: Ten point review of systems has been performed and is otherwise negative and/or non-contributory, except as described above.     VITALS  Vitals:    02/05/25 0936   BP: 122/76   BP Location: Left arm   Patient Position: Sitting   Cuff Size: adult   Pulse: 110   SpO2: 98%   Weight: 175 lb (79.4 kg)         Wt Readings from Last 6 Encounters:   02/05/25 175 lb (79.4 kg)   01/22/25 173 lb 9.6 oz (78.7 kg)   11/08/24 168 lb (76.2 kg)   07/17/24 167 lb (75.8 kg)   05/01/24 173 lb (78.5 kg)   04/19/24 173 lb (78.5 kg)       PHYSICAL EXAM  CONSTITUTIONAL:  awake, alert, cooperative, no apparent distress, and appears stated age Vss stable   PSYCH: normal affect  LUNGS: breathing comfortably  CARDIOVASCULAR:  regular rate   NECK:  no palpable thyroid nodules     Labs/Imaging:   Lab Results   Component Value Date    CHOLEST 134 01/22/2025    CHOLEST 142 10/03/2024    TRIG 124 01/22/2025    TRIG 111 10/03/2024    HDL 42 01/22/2025    HDL 48 10/03/2024    LDL 70 01/22/2025    LDL 74 10/03/2024     Lab Results   Component Value Date    MICROALBCREA 5.5 01/22/2025    MICROALBCREA 7.8 05/31/2024      Lab Results   Component Value Date    CREATSERUM 0.77 01/22/2025    CREATSERUM 0.84 10/03/2024    EGFRCR 97 01/22/2025    EGFRCR 88 10/03/2024     Lab Results   Component Value Date    AST 23 01/22/2025    AST 22 10/03/2024    ALT 25 01/22/2025    ALT 25 10/03/2024       Lab Results   Component Value Date    TSH 1.889 01/22/2025    TSH 3.801 07/17/2024    T4F 1.5 01/22/2025         Overall glucose control:  Lab Results   Component Value  Date    A1C 6.7 (A) 01/22/2025    A1C 6.9 (H) 10/03/2024    A1C 6.2 (H) 05/31/2024    A1C 6.6 (H) 02/19/2024    A1C 7.3 (H) 01/10/2024       Supplementary Documentation:   -Surveillance for Diabetes Complications & Risks  Foot exam/neuropathy: Last Foot Exam: 07/17/24    Retinopathy screening: Last Dilated Eye Exam: 01/08/25  Eye Exam shows Diabetic Retinopathy?: No      Assessment & Plan:     ICD-10-CM    1. Type 2 diabetes mellitus with obesity (HCC)  E11.69 Continuous Glucose Sensor (FREESTYLE URSULA 3 PLUS SENSOR) Does not apply Misc    E66.9 Continuous Glucose  (FREESTYLE URSULA 3 READER) Does not apply Device     Semaglutide (RYBELSUS) 3 MG Oral Tab     Salivary Cortisol, MS ([employer])     Salivary Cortisol, MS ([employer])     Lancets Does not apply Misc     Glucose Blood (RELION GLUCOSE TEST STRIPS) In Vitro Strip      2. Hyperlipidemia associated with type 2 diabetes mellitus (HCC)  E11.69     E78.5       3. Acquired hypothyroidism  E03.9       4. Vitamin D deficiency  E55.9             Zenobia Mccoy is a pleasant 44 year old female here for evaluation of:    #Diabetes  PMHx of Type 2 diabetes mellitus diagnosed at age 32 while she was pregnant with her first child.   -  Last A1c value was 6.7% done 1/22/2025. At goal  - Goal <7%. Importance of better glucose control in preventing onset/progression of end-organ damage discussed, as well as goals of therapy and clinical significance of A1C.  -Plan: checking Vit B12 and midnight salivary cortisol twice checking for CS. Discussed its rationale for assessing it and explained how to obtain saliva, she is non-smoker  - med changes:  If Rybelsus is not covered  Continue Metformin 750 mg per day  Continue Trulicity 4.5 mg weekly   If Rybelsus is covered   Stop  Trulicity, start  Rybelsus, starting it at 3 mg daily for 30 days, then increasing to 7 mg daily for another 30 days and then increase to 14 mg daily.   While on Rybelsus 3 mg,  increase Metformin 750 mg from 1 tab to 2 tabs a day   Once you reach Rybelsus 14 mg daily, decrease Metformin back from 2 tab to 1 tab daily     - continue to check BG 1-2x/day using glucometer or CGM  - Discussed adding GLP-1 to current medication regimen, including action, risk vs benefit, dosing, and potential side effects. Patient denies hx of pancreatitis, gastroparesis, or personal or family hx of medullary thyroid CA or MEN 2.    - Discussed management of low glucose and targeted glucose levels and provided instructions in AVS   -See above header \"Supplementary Documentation\" for surveillance for diabetes complications & risks    Nephropathy screening:   Lab Results   Component Value Date    EGFRCR 97 01/22/2025    MICROALBCREA 5.5 01/22/2025      Blood pressure control: - SBP is to goal <130   BP Readings from Last 1 Encounters:   02/05/25 122/76   BP Meds:      #Hyperlipidemia  Lipids: LDL is to goal   Lab Results   Component Value Date    LDL 70 01/22/2025    TRIG 124 01/22/2025   Cholesterol Meds: atorvastatin Tabs - 10 MG - about once a week forget to take it.    Taking fish oil over the counter. Discussed to be adherent with her statin use and its rationale. Statin prescribed by her PCP.     #Hypothyroidism  - denies hx of neck surgery or  neck radiation, states she was raised in [place] and started levothyroxine at age 42.   - Recent tsh normal 1.88- 1/22/25, no adjustment made recently.   - discussed - Continue taking levothyroxine 50 mcg /day, take levothyroxine, in an empty stomach (before bfast or at least 2-3 hrs after dinner), separate from food and medicine, at least 1 hr apart and at least 4 hrs apart from supplements, Calcium/tums, Vit D, iron    #Vitamin D deficiency  - Vitamin D 30  - start taking Vitamin d over the counter at least [id number] int unit per day, take it 4 hrs apart from your levothyroxine    #BMI 33/obesity  - noted gained some weight  - discussed Balanced diet: carbohydrates,  protein, healthy fats and fiber in each meal. Exercise of at least 150 mins each week. Decrease your simple carbohydrates intake such as sweets: cookies, soda, candy, white rice, white pasta, syrups    Return in about 3 months (around 5/5/2025) for diabetes follow up.    [facility]  Endocrinology, Diabetes & Metabolism   2/5/25    Note to patient: The 21 Century Cures Act makes medical notes like these available to patients in the interest of transparency. However, be advised this is a medical document. It is intended as peer to peer communication. It is written in medical language and may contain abbreviations or verbiage that are unfamiliar. It may appear blunt or direct. Medical documents are intended to carry relevant information, facts as evident, and the clinical opinion of the practitioner.         [1] No Known Allergies

## 2025-02-05 NOTE — PATIENT INSTRUCTIONS
Return Visit:  APN: Return in about 3 months (around 5/5/2025) for diabetes follow up.  [] Video visit  [x] In person only      []  Please schedule the following appointment with our clinic Diabetes Educator, Alisa:     60 minute in person or video visits:  []New Diagnosis Review   []Carb Counting 101   []Type 2 Diet and Lifestyle    []Type 1 / MIRLANDE Refresher Course   []Diabetes and Pregnancy counseling   []Pump 101     30 minute virtual check ins (can be in person if preferred!):  []2 week blood glucose check in   []2 week pump settings check in   []3 week GLP-1 check in     30 minute in person training:  []New Start Insulin   []New Start GLP-1     If you were prescribed a continuous glucose monitor (CGM)or insulin pump at this visit, please call our office at: [phone], as soon as you have picked up ALL of your supplies.  You will then meet with Alisa for an IN PERSON training.  When you call tell the  you are scheduling with Alisa for one of the following:    [] 60 min New Start CGM - tell them the name of the CGM   [] 90 min New Start Pump - tell the the name of the pump and remember to BRING INSULIN to your training     [x]  Directions to 1st floor lab    []  Give blood sugar log  []  PAP paperwork for Ozempic/Jardiance (english/Swazi)     Summary of today's visit:  Medication changes:    If Rybelsus is not covered  Continue Metformin 750 mg per day  Continue Trulicity 4.5 mg weekly   If Rybelsus is covered   Stop  Trulicity, start  Rybelsus, starting it at 3 mg daily for 30 days, then increasing to 7 mg daily for another 30 days and then increase to 14 mg daily.   While on Rybelsus 3 mg, increase Metformin 750 mg from 1 tab to 2 tabs a day   Once you reach Rybelsus 14 mg daily, decrease Metformin back from 2 tab to 1 tab daily   - Since you are not on insulin, a continuous glucose monitor would need to be purchased out of pocket. This is $75 for 2 sensors (1 month supply). Please let us know if you  would like for us to order this to your pharmacy.   - If we started a new medication today that may not be covered by your insurance, our staff will reach out to you regarding any changes in the plan     - check salivary cortisol (order placed), Read the full instruction of the kit before collecting saliva specimen.  ?  **Some of the requirements to follow:  - to obtain it between 11 p-1am.  - Do not eat for at least 1 hr prior collecting specimen.  - Do not consume alcohol 12 hrs prior to collecting specimen.  - Do not brush immediately prior collecting specimen.  - Rinse mouth thoroughly w/ water 10 mins before collecting specimen.  - It should be refrigerated (on a cool temperature) after collecting it and also when being transported.      Additional comments:   - If labs were ordered today, please complete prior to your follow up visit   - Please let us know if you require any refills at least 1 week prior to your medication running out   - Please call our office if sugars at home are consistently greater than 250 or less than 70     HOW TO TREAT LOW BLOOD SUGAR (Hypoglycemia)  Low blood sugar= Less than 70, or if you start to have symptoms (below)  Symptoms: Shaking or trembling, fast heart rate, extreme hunger, sweating, confusion/difficulty concentrating, dizziness.    How to treat a low blood sugar if you are able to eat/drink: The Rule of 15/15  If you are using continuous glucose monitor that says you are low, but you do not have any symptoms, verify on fingerstick that your blood sugar is actually low before treating.   Eat 15 grams of carbs (see examples below)  Check your blood sugar after 15 minutes. If it’s still below your target range, have another serving.   Repeat these steps until it’s in your target range. Once it’s in range, if you're nervous about your sugar going low again, have a protein source (ie, a spoonful of peanut butter).   If you have a CGM you want to look for how your arrow has  changed. If you arrow is pointed up or sideways after 15 min, give your CGM more time OR check with a finger stick. Try not to eat more food until at least 15 min after the first BG check - otherwise you risk having a rebound high.  If you are experiencing symptoms and you are unable to check your blood glucose for any reason, treat the hypoglycemia.  If someone has a low blood sugar and is unconscious: Don’t hesitate to call 911. If someone is unconscious and glucagon is not available or someone does not know how to use it, call 911 immediately.     To treat a low, I recommend you carry with you easy, pre-portioned treatment for low blood sugars that are 15G of carbs:   - Children sized squeeze pouch applesauce (high fiber + carbs help prevent too high of a spike)  - Small children's sized juicebox- 15g carb --> 4oz juice box  - Glucose tablets from [employer] [employer], you can find them near diabetes supplies --> Note, you will need to eat 3-4 tablets to get to 15g of carbs  - Children sized fruit snack pack- look for one with 15 grams of total carbohydrate  - Choice of how to treat your low is important. Complex carbs, or foods that contain fats along with carbs (like chocolate) can slow the absorption of glucose and should NOT be used to treat an emergency low

## 2025-02-06 ENCOUNTER — NURSE ONLY (OUTPATIENT)
Age: 45
End: 2025-02-06
Payer: MEDICAID

## 2025-02-06 ENCOUNTER — TELEPHONE (OUTPATIENT)
Facility: CLINIC | Age: 45
End: 2025-02-06

## 2025-02-06 NOTE — PROGRESS NOTES
I called patient today to go over colonoscopy screening. Patient reports positive symptoms as described below. Informed patient that she will need to be seen in clinic for further evaluation prior to scheduling. Office staff will reach out to her to schedule this appointment.     Staff:  [facility] Colon Screening Orders        Called patient for a scheduled telephone colon screening/FIT positive result.     Medications, pharmacy, and allergies verified with patient over the phone.     Please advise on colonoscopy orders and appropriate bowel prep.      Age 45-76 y/o:    MD preference:  Next available surgeon   Last CBC drawn: CBC:  Lab Results   Component Value Date    WBC 8.4 04/27/2023    WBC 7.1 04/22/2021    WBC 7.8 01/10/2020    .0 04/27/2023    .0 04/22/2021    .0 01/10/2020     Date of positive FIT test: N/A  H/W 173 lb (79.4 kg) /BMI 33.9 kg/m2 :      Special comments/notes:  Recall details:      Last Procedure, Date, MD:       Last diagnosis:     Recalled for (mth/yrs):     Sedation used previously:     Last Prep Used:     Quality of Prep:        Telephone colon screening Questionnaires:  Yes No   Are you currently experiencing any new GI symptoms? []  [x]    If yes, symptom details:       Rectal Bleeding with or without bowel movements: []  [x]    Black stool: []  [x]    Dysphagia &Food feeling/getting stuck:  Patient reports feeling like food and pills get stuck with swallowing.  [x]  []    Intractable Vomiting: []  []    Unexplained weight loss: []  []    First colonoscopy? []  []    Family history of colon cancer?  Paternal grandfather []  []    Any issues with anesthesia? []  []    If yes, explain details:        Personal history of Resp. Issues/Oxygen Use/MACRINA/COPD? []  []    If yes, with CPAP/BiPAP:  []  []    History of devices Pacemaker/Defibrillator/Stents? []  []    History of Cardiac/CVA issues/(MI/Stroke):  []  []       Medication usage:  Yes  No   Anticoagulants:  Anticoagulant  (Except Aspirin) ? Route to RN staff to obtain ordering provider orders []  []    Diabetic Meds:   PO DM Meds ? Hold day prior and day of procedure  Insulin ? Route to RN staff to obtain ordering provider orders  []  []    Weight loss meds (phentermine/Vyvanse/Saxsenda): []  []    Iron/Herbal/Multivitamin Supplement (RX/OTC): []  []    Usage of marijuana, CBD &/or vape products: []  []          Allergies[1]      Current Outpatient Medications:     Continuous Glucose Sensor (FREESTYLE URSULA 3 PLUS SENSOR) Does not apply Misc, 1 each by Other route every 15 (fifteen) days., Disp: 2 each, Rfl: 5    Continuous Glucose  (FREESTYLE URSULA 3 READER) Does not apply Device, Use it to check glucose daily, Disp: 1 each, Rfl: 0    Semaglutide (RYBELSUS) 3 MG Oral Tab, Take 3 mg by mouth daily., Disp: 30 tablet, Rfl: 0    metFORMIN  MG Oral Tablet 24 Hr, Take 1 tablet (750 mg total) by mouth daily., Disp: 90 tablet, Rfl: 0    Lancets Does not apply Misc, Use to test BG 1x/day., Disp: 100 each, Rfl: 2    Glucose Blood (RELION GLUCOSE TEST STRIPS) In Vitro Strip, Check glucose once a day, Disp: 100 each, Rfl: 1    Cholecalciferol 125 MCG (5000 UT) Oral Tab, Take 1 tablet (5,000 Units total) by mouth daily., Disp: , Rfl:     pilocarpine 1 % Ophthalmic Solution, Place 1 drop into both eyes in the morning and 1 drop before bedtime., Disp: , Rfl:     Phentermine HCl 15 MG Oral Cap, Take 1 capsule (15 mg total) by mouth every morning., Disp: 30 capsule, Rfl: 0    atorvastatin 10 MG Oral Tab, Take 1 tablet (10 mg total) by mouth nightly., Disp: 90 tablet, Rfl: 1    Dulaglutide (TRULICITY) 4.5 MG/0.5ML Subcutaneous Solution Auto-injector, Inject 4.5 mg into the skin once a week., Disp: 6 mL, Rfl: 1    LEVOTHYROXINE 50 MCG Oral Tab, Take 1 tablet (50 mcg total) by mouth before breakfast., Disp: 90 tablet, Rfl: 0    magnesium 30 MG Oral Tab, Take 1 tablet (30 mg total) by mouth daily with breakfast., Disp: , Rfl:     Omega-3  Fatty Acids (FISH OIL) 300 MG Oral Cap, Take by mouth., Disp: , Rfl:     Past Surgical History:   Procedure Laterality Date      2013    Surgery - external      Tummy tuck    Tubal ligation             [1] No Known Allergies

## 2025-02-06 NOTE — TELEPHONE ENCOUNTER
Received fax from pharmacy requesting a PA for FREESTYLE URSULA 3 READER and FREESTYLE URSULA 3 PLUS SENSOR.    Patient is aware that she will be paying out of pocket for Rxs due to patient not being on any insulin.    Closing encounter   20

## 2025-02-06 NOTE — PROGRESS NOTES
Staff:  [facility] Colon Screening Orders    Please schedule: Colonoscopy [id number]    Diagnosis: Colon Screening Z12.11 / History of Colon polyps  Z86.010 / Family history of colon cancer Z80.0     Please send Trilyte bowel prep         Medication adjustments:  Day before procedure, hold:  Day of procedure, hold:     >>>Please inform patient if new medications are started after scheduling procedure they need to call clinic to notify us.      Called patient for a scheduled telephone colon screening/FIT positive result.     Medications, pharmacy, and allergies verified with patient over the phone.     Please advise on colonoscopy orders and appropriate bowel prep.      Age 45-76 y/o:    MD preference:   Last CBC drawn: CBC:  Lab Results   Component Value Date    WBC 8.4 04/27/2023    WBC 7.1 04/22/2021    WBC 7.8 01/10/2020    .0 04/27/2023    .0 04/22/2021    .0 01/10/2020     Date of positive FIT test: N/A  H/W/BMI:      Special comments/notes:  Recall details:      Last Procedure, Date, MD:       Last diagnosis:     Recalled for (mth/yrs):     Sedation used previously:     Last Prep Used:     Quality of Prep:        Telephone colon screening Questionnaires:  Yes No   Are you currently experiencing any new GI symptoms? []  []    If yes, symptom details:       Rectal Bleeding with or without bowel movements: []  []    Black stool: []  []    Dysphagia &Food feeling/getting stuck: []  []    Intractable Vomiting: []  []    Unexplained weight loss: []  []    First colonoscopy? []  []    Family history of colon cancer? []  []    Any issues with anesthesia? []  []    If yes, explain details:        Personal history of Resp. Issues/Oxygen Use/MACRINA/COPD? []  []    If yes, with CPAP/BiPAP:  []  []    History of devices Pacemaker/Defibrillator/Stents? []  []    History of Cardiac/CVA issues/(MI/Stroke):  []  []       Medication usage:  Yes  No   Anticoagulants:  Anticoagulant (Except Aspirin) ? Route to RN staff to  obtain ordering provider orders []  []    Diabetic Meds:   PO DM Meds ? Hold day prior and day of procedure  Insulin ? Route to RN staff to obtain ordering provider orders  []  []    Weight loss meds (phentermine/Vyvanse/Saxsenda): []  []    Iron/Herbal/Multivitamin Supplement (RX/OTC): []  []    Usage of marijuana, CBD &/or vape products: []  []          Allergies[1]      Current Outpatient Medications:     Continuous Glucose Sensor (FREESTYLE URSULA 3 PLUS SENSOR) Does not apply Misc, 1 each by Other route every 15 (fifteen) days., Disp: 2 each, Rfl: 5    Continuous Glucose  (FREESTYLE URSULA 3 READER) Does not apply Device, Use it to check glucose daily, Disp: 1 each, Rfl: 0    Semaglutide (RYBELSUS) 3 MG Oral Tab, Take 3 mg by mouth daily., Disp: 30 tablet, Rfl: 0    metFORMIN  MG Oral Tablet 24 Hr, Take 1 tablet (750 mg total) by mouth daily., Disp: 90 tablet, Rfl: 0    Lancets Does not apply Misc, Use to test BG 1x/day., Disp: 100 each, Rfl: 2    Glucose Blood (RELION GLUCOSE TEST STRIPS) In Vitro Strip, Check glucose once a day, Disp: 100 each, Rfl: 1    Cholecalciferol 125 MCG (5000 UT) Oral Tab, Take 1 tablet (5,000 Units total) by mouth daily., Disp: , Rfl:     pilocarpine 1 % Ophthalmic Solution, Place 1 drop into both eyes in the morning and 1 drop before bedtime., Disp: , Rfl:     Phentermine HCl 15 MG Oral Cap, Take 1 capsule (15 mg total) by mouth every morning., Disp: 30 capsule, Rfl: 0    atorvastatin 10 MG Oral Tab, Take 1 tablet (10 mg total) by mouth nightly., Disp: 90 tablet, Rfl: 1    Dulaglutide (TRULICITY) 4.5 MG/0.5ML Subcutaneous Solution Auto-injector, Inject 4.5 mg into the skin once a week., Disp: 6 mL, Rfl: 1    LEVOTHYROXINE 50 MCG Oral Tab, Take 1 tablet (50 mcg total) by mouth before breakfast., Disp: 90 tablet, Rfl: 0    magnesium 30 MG Oral Tab, Take 1 tablet (30 mg total) by mouth daily with breakfast., Disp: , Rfl:     Omega-3 Fatty Acids (FISH OIL) 300 MG Oral Cap,  Take by mouth., Disp: , Rfl:     Past Surgical History:   Procedure Laterality Date      2013    Surgery - external      Tummy tuck    Tubal ligation              [1] No Known Allergies

## 2025-02-16 DIAGNOSIS — E03.9 HYPOTHYROIDISM, UNSPECIFIED TYPE: ICD-10-CM

## 2025-02-17 RX ORDER — LEVOTHYROXINE SODIUM 50 UG/1
50 TABLET ORAL
Qty: 90 TABLET | Refills: 0 | Status: SHIPPED | OUTPATIENT
Start: 2025-02-17

## 2025-02-18 ENCOUNTER — TELEPHONE (OUTPATIENT)
Facility: CLINIC | Age: 45
End: 2025-02-18

## 2025-02-18 NOTE — TELEPHONE ENCOUNTER
PA initiated through CMM for RYBELSUS 3 MG TAB  Key: [id number]    Per CMM: Prior Authorization not required for patient/medication    Phoned pharmacy and confirmed no PA needed and pharmacist confirmed no PA needed and prescription went through insurance with no charge.    Pharmacy will contact patient.    Closing encounter.

## 2025-03-18 DIAGNOSIS — E03.9 HYPOTHYROIDISM, UNSPECIFIED TYPE: ICD-10-CM

## 2025-03-19 RX ORDER — LEVOTHYROXINE SODIUM 50 UG/1
50 TABLET ORAL
Qty: 90 TABLET | Refills: 0 | Status: SHIPPED | OUTPATIENT
Start: 2025-03-19

## 2025-03-19 RX ORDER — METFORMIN HYDROCHLORIDE 750 MG/1
750 TABLET, EXTENDED RELEASE ORAL DAILY
Qty: 90 TABLET | Refills: 1 | Status: SHIPPED | OUTPATIENT
Start: 2025-03-19 | End: 2025-03-20

## 2025-03-19 NOTE — TELEPHONE ENCOUNTER
Endocrine Refill protocol for metformin    Protocol Criteria:  PASSED  Reason: N/A    If all below requirements are met, send a 90-day supply with 1 refill per provider protocol.     Verify appointment with Endocrinology completed in the last 6 months or scheduled in the next 3 months.  Verify A1C has been completed within the last 6 months and is below 8.5%  Verify last GFR is greater than or equal to 40 in the past 12 months    Last completed office visit:2/5/2025 Roberta Molina APRN   Next scheduled Follow up:   Future Appointments   Date Time Provider Department Center   3/26/2025  9:00 AM Errol Valentin MD [facility]   4/8/2025  9:30 AM Tavon Austin MD [id number]   5/21/2025  9:30 AM Roberta Molina APRN [facility]       Last GFR result:    Lab Results   Component Value Date    EGFRCR 97 01/22/2025     Last A1c result: Last A1C result: 6.7% done 1/22/2025.    Sent to pharmacy per protocol.

## 2025-03-19 NOTE — TELEPHONE ENCOUNTER
Thyroid Medication Protocol Scgyaf9903/18/2025 09:25 PM   Protocol Details TSH in past 12 months    Last TSH value is normal    In person appointment or virtual visit in the past 12 mos or appointment in next 3 mos    Medication is active on med list     Request for LEVOTHYROXINE 50 MCG Oral Tab     LOV 1/22/25 with Maureen Leyva APRN   Last refill 2/17/25 - 90 tablets 0 refill   Future Appointments   Date Time Provider Department Center   3/26/2025  9:00 AM Errol Valentin MD [facility]   4/8/2025  9:30 AM Tavon Austin MD [facility] [id number]   5/21/2025  9:30 AM Roberta Molina APRN [facility] [place]     Labs 1/22/25 - TSH 1.889

## 2025-03-20 DIAGNOSIS — E66.9 TYPE 2 DIABETES MELLITUS WITH OBESITY (HCC): Primary | ICD-10-CM

## 2025-03-20 DIAGNOSIS — E11.69 TYPE 2 DIABETES MELLITUS WITH OBESITY (HCC): Primary | ICD-10-CM

## 2025-03-20 RX ORDER — METFORMIN HYDROCHLORIDE 750 MG/1
750 TABLET, EXTENDED RELEASE ORAL DAILY
Qty: 90 TABLET | Refills: 1 | Status: SHIPPED | OUTPATIENT
Start: 2025-03-20 | End: 2025-03-21

## 2025-03-20 NOTE — TELEPHONE ENCOUNTER
Endocrine Refill protocol for metformin    Protocol Criteria:  PASSED  Reason: N/A    If all below requirements are met, send a 90-day supply with 1 refill per provider protocol.     Verify appointment with Endocrinology completed in the last 6 months or scheduled in the next 3 months.  Verify A1C has been completed within the last 6 months and is below 8.5%  Verify last GFR is greater than or equal to 40 in the past 12 months    Last completed office visit:2/5/2025 Roberta Molina APRN   Next scheduled Follow up:   Future Appointments   Date Time Provider Department Center   4/8/2025  9:30 AM Tavon Austin MD [id number]   5/21/2025  9:30 AM Roberta Molina APRN [facility]       Last GFR result:    Lab Results   Component Value Date    EGFRCR 97 01/22/2025     Last A1c result: Last A1C result: 6.7% done 1/22/2025.     Pended and routed for review.

## 2025-03-21 RX ORDER — METFORMIN HYDROCHLORIDE 750 MG/1
750 TABLET, EXTENDED RELEASE ORAL 2 TIMES DAILY WITH MEALS
Qty: 180 TABLET | Refills: 1 | Status: SHIPPED | OUTPATIENT
Start: 2025-03-21

## 2025-03-21 NOTE — TELEPHONE ENCOUNTER
Telephoned patient whom stated she started taking 3 mg rybelsus 11 days ago.   Metformin 750 mg started 11 days ago    Checking sugars: finger stick in the morning.     138 in the morning    side effects: diarrhea,bubbling in the stomach , cramps in the stomach, not period related. Mild side effects that is why she didn't call us.

## 2025-04-07 ENCOUNTER — PATIENT MESSAGE (OUTPATIENT)
Dept: FAMILY MEDICINE CLINIC | Facility: CLINIC | Age: 45
End: 2025-04-07

## 2025-04-07 ENCOUNTER — PATIENT MESSAGE (OUTPATIENT)
Facility: CLINIC | Age: 45
End: 2025-04-07

## 2025-04-07 DIAGNOSIS — E66.811 CLASS 1 OBESITY DUE TO EXCESS CALORIES WITH SERIOUS COMORBIDITY AND BODY MASS INDEX (BMI) OF 33.0 TO 33.9 IN ADULT: Primary | ICD-10-CM

## 2025-04-07 DIAGNOSIS — E66.9 TYPE 2 DIABETES MELLITUS WITH OBESITY (HCC): Primary | ICD-10-CM

## 2025-04-07 DIAGNOSIS — E11.69 TYPE 2 DIABETES MELLITUS WITH OBESITY (HCC): Primary | ICD-10-CM

## 2025-04-07 DIAGNOSIS — E66.09 CLASS 1 OBESITY DUE TO EXCESS CALORIES WITH SERIOUS COMORBIDITY AND BODY MASS INDEX (BMI) OF 33.0 TO 33.9 IN ADULT: Primary | ICD-10-CM

## 2025-04-07 RX ORDER — PHENTERMINE HYDROCHLORIDE 30 MG/1
30 CAPSULE ORAL EVERY MORNING
Qty: 30 CAPSULE | Refills: 0 | Status: SHIPPED | OUTPATIENT
Start: 2025-04-07 | End: 2025-05-07

## 2025-04-07 NOTE — TELEPHONE ENCOUNTER
Endocrine Refill protocol for oral and injectable diabetic medications    Protocol Criteria:  PASSED  Reason: N/A    If all below requirements are met, send a 90-day supply with 1 refill per provider protocol.    Verify appointment with Endocrinology completed in the last 6 months or scheduled in the next 3 months.  Verify A1C has been completed within the last 6 months and is below 8.5%     Last completed office visit: 2/5/2025 Roberta Molina APRN   Next scheduled Follow up:   Future Appointments   Date Time Provider Department Center   4/8/2025  9:30 AM Tavon Austin MD [id number]   5/21/2025  9:30 AM Roberta Molina APRN [facility]      Last A1c result: Last A1c value was 6.7% done 1/22/2025.     Per Telephone Encounter 3/20- Continue Rybelsus 3 mg daily and Metformin 750 mg twice a day for 4 weeks and then on week 5 if you are tolerating the side effects will increase Rybelsus from 3 to 7 mg per day and I will cut back the Metformin from 2 tab to 1 tab.

## 2025-04-08 ENCOUNTER — OFFICE VISIT (OUTPATIENT)
Facility: LOCATION | Age: 45
End: 2025-04-08
Payer: MEDICAID

## 2025-04-08 VITALS
TEMPERATURE: 99 F | DIASTOLIC BLOOD PRESSURE: 83 MMHG | HEART RATE: 113 BPM | SYSTOLIC BLOOD PRESSURE: 133 MMHG | OXYGEN SATURATION: 97 %

## 2025-04-08 DIAGNOSIS — R13.10 DYSPHAGIA, UNSPECIFIED TYPE: Primary | ICD-10-CM

## 2025-04-08 DIAGNOSIS — Z12.11 ENCOUNTER FOR SCREENING COLONOSCOPY: ICD-10-CM

## 2025-04-08 PROCEDURE — 99203 OFFICE O/P NEW LOW 30 MIN: CPT | Performed by: STUDENT IN AN ORGANIZED HEALTH CARE EDUCATION/TRAINING PROGRAM

## 2025-04-08 RX ORDER — ORAL SEMAGLUTIDE 7 MG/1
7 TABLET ORAL DAILY
Qty: 30 TABLET | Refills: 1 | Status: SHIPPED | OUTPATIENT
Start: 2025-04-08 | End: 2025-05-08

## 2025-04-08 RX ORDER — AMOXICILLIN 500 MG/1
500 TABLET, FILM COATED ORAL 3 TIMES DAILY
COMMUNITY
Start: 2025-04-02

## 2025-04-08 RX ORDER — METFORMIN HYDROCHLORIDE 750 MG/1
750 TABLET, EXTENDED RELEASE ORAL
COMMUNITY
Start: 2025-04-08

## 2025-04-08 RX ORDER — POLYETHYLENE GLYCOL 3350, SODIUM CHLORIDE, SODIUM BICARBONATE, POTASSIUM CHLORIDE 420; 11.2; 5.72; 1.48 G/4L; G/4L; G/4L; G/4L
POWDER, FOR SOLUTION ORAL
Qty: 1 EACH | Refills: 0 | Status: SHIPPED | OUTPATIENT
Start: 2025-04-08

## 2025-04-08 NOTE — H&P
New Patient Visit Note       Active Problems      1. Dysphagia, unspecified type    2. Encounter for screening colonoscopy        Chief Complaint   Chief Complaint   Patient presents with    New Patient      NP - patient reports concerns with pills getting stuck with swallowing, egd consult, no other symptoms.       History of Present Illness   History of Present Illness  Zenobia Mccoy is a 44 year old female who presents for a colonoscopy consultation due to family history of colon cancer.    She has a family history of colon cancer, with her paternal grandfather diagnosed in . This is her first colonoscopy, and she has no symptoms such as hematochezia or unintentional weight loss. Her last fecal occult blood test in September of the previous year was normal.    She experiences occasional dysphagia with pills, particularly when not consuming enough water. She is currently taking antibiotics and analgesics for a dental infection, along with her regular medications like fish oil and thyroid meds. Occasionally, small pills feel lodged in her throat, but this does not occur with food. No heartburn or odynophagia.     She has a history of diabetes and has been on Trulicity for a year and a half. She experienced severe abdominal pain after a dose increase, leading to an emergency room visit in August of the previous year. Diagnostic tests at the time were unremarkable. She has since switched to a lower dose of Rybelsus and takes metformin twice daily.    Her social history includes no smoking, drug use, or regular alcohol consumption. She has a history of abdominoplasty and  sections, with a tubal ligation performed 12 years ago.          Allergies  Zenobia has No Known Allergies.    Past Medical / Surgical / Social / Family History    The past medical and past surgical history have been reviewed by me today.    Past Medical History:    Diabetes (HCC)    Diabetes mellitus (HCC)    Hyperlipidemia     Hypothyroidism    Leukocytosis    Obesity     Past Surgical History:   Procedure Laterality Date      2013      May 2012    Surgery - external      Tummy tuck    Tubal ligation         The family history and social history have been reviewed by me today.    Family History   Problem Relation Age of Onset    Heart Disorder Father     Other (Hypothyroidism) Father     Heart Disease Father     Diabetes Mother     Cancer Mother 67        lymphoma    No Known Problems Daughter     No Known Problems Son     Diabetes Maternal Grandmother     Heart Disorder Maternal Grandmother     Diabetes Maternal Grandfather     Heart Disorder Maternal Grandfather     Diabetes Paternal Grandmother     Heart Disorder Paternal Grandmother     Diabetes Paternal Grandfather     Heart Disorder Paternal Grandfather     Colon Cancer Paternal Grandfather      Social History     Socioeconomic History    Marital status:    Tobacco Use    Smoking status: Never    Smokeless tobacco: Never   Vaping Use    Vaping status: Never Used   Substance and Sexual Activity    Alcohol use: Not Currently     Comment: Rarely    Drug use: Never   Other Topics Concern    Caffeine Concern No    Stress Concern No    Weight Concern Yes    Special Diet No    Exercise No    Seat Belt Yes        Current Outpatient Medications:     amoxicillin 500 MG Oral Tab, Take 1 tablet (500 mg total) by mouth 3 (three) times daily., Disp: , Rfl:     PEG 3350-KCl-Na Bicarb-NaCl 420 g Oral Recon Soln, Starting at 4:00 pm the night before procedure, drink 8 ounces of the prep every 15-20 minutes until finished, Disp: 1 each, Rfl: 0    Phentermine HCl 30 MG Oral Cap, Take 1 capsule (30 mg total) by mouth every morning., Disp: 30 capsule, Rfl: 0    metFORMIN  MG Oral Tablet 24 Hr, Take 1 tablet (750 mg total) by mouth 2 (two) times daily with meals., Disp: 180 tablet, Rfl: 1    LEVOTHYROXINE 50 MCG Oral Tab, TAKE ONE TABLET BY MOUTH EVERY MORNING BEFORE  BREAKFAST, Disp: 90 tablet, Rfl: 0    Continuous Glucose Sensor (FREESTYLE URSULA 3 PLUS SENSOR) Does not apply Misc, 1 each by Other route every 15 (fifteen) days., Disp: 2 each, Rfl: 5    Continuous Glucose  (FREESTYLE URSULA 3 READER) Does not apply Device, Use it to check glucose daily, Disp: 1 each, Rfl: 0    Lancets Does not apply Misc, Use to test BG 1x/day., Disp: 100 each, Rfl: 2    Glucose Blood (RELION GLUCOSE TEST STRIPS) In Vitro Strip, Check glucose once a day, Disp: 100 each, Rfl: 1    Cholecalciferol 125 MCG (5000 UT) Oral Tab, Take 1 tablet (5,000 Units total) by mouth daily., Disp: , Rfl:     pilocarpine 1 % Ophthalmic Solution, Place 1 drop into both eyes in the morning and 1 drop before bedtime., Disp: , Rfl:     atorvastatin 10 MG Oral Tab, Take 1 tablet (10 mg total) by mouth nightly., Disp: 90 tablet, Rfl: 1    magnesium 30 MG Oral Tab, Take 1 tablet (30 mg total) by mouth daily with breakfast., Disp: , Rfl:     Omega-3 Fatty Acids (FISH OIL) 300 MG Oral Cap, Take by mouth., Disp: , Rfl:     Dulaglutide (TRULICITY) 4.5 MG/0.5ML Subcutaneous Solution Auto-injector, Inject 4.5 mg into the skin once a week., Disp: 6 mL, Rfl: 1      Review of Systems  The Review of Systems has been reviewed by me during today.  Review of Systems   Constitutional:  Negative for chills, diaphoresis, fatigue and fever.   HENT:  Negative for ear discharge, ear pain and sore throat.    Eyes:  Negative for pain and discharge.   Respiratory:  Negative for cough, chest tightness and shortness of breath.    Cardiovascular:  Negative for chest pain, palpitations and leg swelling.   Gastrointestinal:  Negative for abdominal distention, abdominal pain, blood in stool, constipation, diarrhea, nausea and vomiting.        Dysphagia with some pills   Genitourinary:  Negative for dysuria, frequency, hematuria and urgency.   Skin:  Negative for color change, pallor and rash.   Neurological:  Negative for weakness,  light-headedness, numbness and headaches.   Hematological:  Negative for adenopathy. Does not bruise/bleed easily.   Psychiatric/Behavioral:  Negative for agitation and confusion.        Physical Findings   /83 (BP Location: Left arm, Patient Position: Sitting, Cuff Size: adult)   Pulse 113   Temp 98.7 °F (37.1 °C) (Temporal)   LMP 04/05/2025 (Exact Date)   SpO2 97%   Physical Exam  Constitutional:       Appearance: Normal appearance.   HENT:      Head: Normocephalic and atraumatic.   Cardiovascular:      Pulses: Normal pulses.   Pulmonary:      Effort: Pulmonary effort is normal.   Abdominal:      General: Abdomen is flat. There is no distension.      Palpations: Abdomen is soft.      Tenderness: There is no abdominal tenderness. There is no guarding or rebound.   Skin:     General: Skin is warm.      Capillary Refill: Capillary refill takes less than 2 seconds.   Neurological:      Mental Status: She is alert and oriented to person, place, and time. Mental status is at baseline.             Assessment/Plan  1. Dysphagia, unspecified type    2. Encounter for screening colonoscopy        Zenobia Mccoy is a 44 year old female referred by Gale Barnes MD     Assessment & Plan    Colonoscopy for colon cancer screening  Scheduled for first colonoscopy due to family history of colon cancer. Informed about procedure, risks, and importance of early detection.The benefits of colonoscopy, including detection of cancer and polyp removal prior to progression to cancer were discussed. The details of the procedure which include navigation of the colonoscope through the anus, rectum, and colon to evaluate the mucosa and removal of any polyps encountered were discussed as well. The risks of colonoscopy were discussed with the patient and include but are not limited to post-procedure bleeding, infection, colorectal perforation, splenic injury, missed polyps, need for additional surgeries or interventions. All  questions were answered and the patient voiced understanding and agreed to proceed with colonoscopy.      Dysphagia  Reports occasional difficulty swallowing pills. Upper endoscopy considered to rule out structural abnormalities. Agreed to concurrent endoscopy with colonoscopy.  - Will start with an upper GI with esophagogram to assess swallowing function  - Schedule upper endoscopy with colonoscopy.  Complications of esophagogastroduodenoscopy were discussed and include but are not limited to bleeding and tearing or perforation of the esophagus, stomach, or small intestines. These complications, should they occur, may require surgery, hospitalization, repeat EGD, and/or a transfusion. Perforation of the esophagus, stomach, or duodenum are known, but rare complications which can occur at a rate of less than 1 per 1,000 endoscopies. Bleeding, usually after a polyp removal, can occur at a rate of less than 1 per 1,000 endoscopies and continue up to two weeks after a polyp is removed. Other extremely rare, but serious or possibly fatal risks include: difficulty breathing, heart attack, and stroke.      Diabetes management  Diabetes managed with Rybelsus and Metformin. Previous severe abdominal pain led to change in medication regimen.  - Continue current diabetes management per dr. Barnes      .     No orders of the defined types were placed in this encounter.      Imaging & Referrals   XR UGI/ESOPHAGUS DOUBLE CONTRAST (CPT=74246)    Follow Up  No follow-ups on file.    Tavon Austin MD    CC   Gale Barnes MD

## 2025-04-08 NOTE — PROGRESS NOTES
The following individual(s) verbally consented to be recorded using ambient AI listening technology and understand that they can each withdraw their consent to this listening technology at any point by asking the clinician to turn off or pause the recording:    Patient name: Zenobia Gartamia

## 2025-04-14 NOTE — TELEPHONE ENCOUNTER
History of Present Illness  Unable to Obtain Secondary to: poor historian. Informant: patient. Per Chart Review.   73 y/o male admitted 9-20-18 to [facility] for therapies after hospitalization on 9-18-18 at [facility]; REVIEW H &P: 72 year old  paraplegic male with a PMH of recurrent CAUTI's, CHF, hypothyroidism, Parkinson's, PUD who presents with concerns about a change in mentation. He is completely immobile from Parkinson's disease and a failed back surgery and has a chronic indwelling Kam cath. His wife notes that he has been intermittently lethargic and ALOC off and on and today was unresponsive to her voice though apparently awake. He was admitted and treated this month with similar problems and discharged a week ago. His prior workup revealed a multi-drug resistant E.coli species which was treated with appropriate antibiotics both inpatient and continued PO course at home. Since his discharge he had his kam replaced last week during which time he should have had a sterile bladder given the appropriately directed treatment. Today his ED workup revealed UA which is very similar to before his last admission. Admitted Iv antb; repeated urine cx same organism as prior UTI. CT abd/pelvis to look for calculi = negative. Needs urology consult with by Dr. Byrnes who sees him OP for investigation of bladder.     Previous recent hospitalization 9-3-18 / 9-8-18 - UTI / MDRO; given IV antb x 4 days; discharged to home on Nitrofurantoin 100 mg po 2x daily for 7 days      DX:Recurrent CAUTIs without evidence of sepsis; Metabolic encephalopathy    PMHX: Chronic Back Pain, Congestive Heart Failure, Hypothyroidism, Paraplegia, Parkinson's Disease, Parkinson's Dementia, Peptic Ulcer Disease, Peripheral Vascular Dis, Frequent Urinary Tract Infections            CC: Re-eval of hematuria; Review FU on Appt w/ Dr Byrnes  9-25-18  After re-starting Nitrofurantoin 100 mg cap po BID and encouraging  Patient responded with understanding.    Closing Encounter.    fluids every shift hematuria resolved; clear zoran urine per kam cath.   Appt w/ Dr Byrnes - Urology today; Kam cath changed; Will FU every 2 weeks for cath change.    VS t 98.0, p 63, r 18, b/p 132/70, o2 sat 98% RA.      Review of SystemsConst: Normal.   CV: no palpitations, no lightheadedness, no lower extremity edema and no dyspnea on exertion.   Resp: no cough, not expressed as feeling short of breath and no wheezing.   GI: no abdominal pain, no nausea, no vomiting, no diarrhea and no constipation.   : cloudy urine, but no hematuria Long Term Kam Cath.   Neuro: difficulty walking, numbness and feeling weak.   Psych: no ideations and not suicidal.   Skin: no bruising, no pruritus, no skin lesions, no rash and no skin wound.      Physical Exam  Constitutiontal: alert, in no acute distress and current vital signs reviewed.   ENT: oral mucosa pink and moist.   Pulmonary: no respiratory distress and normal respiratory rate and effort. breath sounds clear to auscultation bilaterally .  no rales or crackles were heard bilaterally. no rhonchi. no wheezing. no diminished breath sounds.   Cardiovascular: normal rate and regular rhythm. right posterior tibialis 1+ right dorsalis pedis 1+ left posterior tibialis 1+ left dorsalis pedis 1+ edema was not present in the lower extremities. the jugular vein was normal.   Abdomen: soft, nontender, nondistended and normal bowel sounds.   Musculoskeletal: Paraplegic; Anna Lift for transfers; Mobile via electric chair   Psychiatric: oriented to person, oriented to place and oriented to time . Periods of forgetfulness & confusion; Waxes and wanes. alert and awake, interactive and  Mood and Affect: anxious and depressed. no suicidal thoughts and no homicidal thoughts.   Skin, Hair, Nails: normal skin color and pigmentation and no rash. no skin lesions and no foot lesions. normal skin turgor.   Lines and Tubing:   Kam Catheter: #16 fr it had an appropriate fit.had no  drainage and had no erythema. Last Changed: 9-25-18.   Constitutional: alert, in no acute distress and current vital signs reviewed.      Assessment  Essential hematuria (R31.9)  Cloudy urine (R82.90)    Plan  Cloudy urine (R82.90) Essential hematuria (R31.9) Resolved w/ Nitrofurantoin 2x daily; Galeana cath changed at appt w/ Dr Byrnes today; Appt to return every 2 weeks for cath change.   Patients Goals of Care: stabilization to prolong life. Full Code.   Advanced Care Planning (ACP): Full Code.   Goals of Care: Home w/ wife.   End of life decisions were not reviewed with the patient. I disagree with the patient's decisions.   Concerns With the Patient's End of Life Decisions: Chronic conditions w/ poor prognosis.   Prognosis: fair.   Therapy Plan: Physical Therapy.   Wounds: absent.   Labs: 9-21-18 wbc 4.9, rbc 4.28, H&H 12.3 / 37.7, bg 73, na+ 144, k+ 3.9, bun 8, creat 0.6, gfr 132, phos 2.7, mag 1.9.   Prophylaxis: Venous Thromboembolism (VTE): No.   GI Ulcer: No   Discharge Plan: Home and Palliative Medicine.      Post Discharge F/U Arrangements Include:    Date: 1 wk post DC. Reason: FU PCP Dr Zamudio.   Date: Every 2 weeks. Reason: FU Urology Dr Byrnes.      Signatures   Electronically signed by : EDNA MEMBRENO; Sep 27 2018  6:36PM CST

## 2025-04-15 ENCOUNTER — HOSPITAL ENCOUNTER (OUTPATIENT)
Dept: GENERAL RADIOLOGY | Age: 45
Discharge: HOME | End: 2025-04-15
Attending: STUDENT IN AN ORGANIZED HEALTH CARE EDUCATION/TRAINING PROGRAM
Payer: MEDICAID

## 2025-04-15 DIAGNOSIS — R13.10 DYSPHAGIA, UNSPECIFIED TYPE: ICD-10-CM

## 2025-04-15 PROCEDURE — 74246 X-RAY XM UPR GI TRC 2CNTRST: CPT | Performed by: STUDENT IN AN ORGANIZED HEALTH CARE EDUCATION/TRAINING PROGRAM

## 2025-04-23 ENCOUNTER — TELEPHONE (OUTPATIENT)
Facility: LOCATION | Age: 45
End: 2025-04-23

## 2025-04-23 NOTE — TELEPHONE ENCOUNTER
RAUL HARDY Patient  Member ID  [id number]    Date of Birth  1980-08-04    Gender  Female    Transaction Type  Outpatient Authorization    Organization  [facility]    Payer  [facility]     Certificate Information  Reference Number  [id number]    Status  NO ACTION REQUIRED    Message  Requested Service does not require preauthorization. We would strongly encourage you to check benefits for this service.    Member Information  Patient Name  RAUL HARDY    Patient Date of Birth  1980-08-04    Patient Gender  Female    Member ID  [id number]    Relationship to Subscriber  Self    Subscriber Name  RAUL HARDY    Requesting Provider     Name  ALESSIOGEORGINASUSHIL DHALIWAL    NPI  [id number]    Tax Id  [id number]    Specialty  [id number]    Provider Role  Provider    Address  [address] [place] [address]    Phone  [phone]    Fax  [phone]    Contact Name  ASAD FLORENCE    Service Information  Service Type  2 - Surgical    Place of Service  22 - [facility]    Service From - To Date  2025-05-08 - 2025-08-02    Level of Service  Elective    Diagnosis Code 1   - Dysphagia unspecified    Diagnosis Code 2   - Encounter for screening for malignant neoplasm of colon    Procedure Code 1 (CPT/HCPCS)  20193 - DIAGNOSTIC COLONOSCOPY    Quantity  1 Units    Status  NO ACTION REQUIRED    Procedure Code 2 (CPT/HCPCS)  09565 - EGD BIOPSY SINGLE/MULTIPLE    Quantity  1 Units    Status  NO ACTION REQUIRED

## 2025-04-24 ENCOUNTER — APPOINTMENT (OUTPATIENT)
Dept: ADMINISTRATIVE | Facility: HOSPITAL | Age: 45
End: 2025-04-24
Payer: MEDICAID

## 2025-05-01 ENCOUNTER — TELEPHONE (OUTPATIENT)
Facility: LOCATION | Age: 45
End: 2025-05-01

## 2025-05-01 DIAGNOSIS — Z12.11 ENCOUNTER FOR SCREENING COLONOSCOPY: Primary | ICD-10-CM

## 2025-05-01 NOTE — TELEPHONE ENCOUNTER
Patient called to cancel colonoscopy due to personal reasons    She will call when ready to reschedule

## 2025-06-30 DIAGNOSIS — E11.69 TYPE 2 DIABETES MELLITUS WITH OBESITY (HCC): ICD-10-CM

## 2025-06-30 DIAGNOSIS — E66.9 TYPE 2 DIABETES MELLITUS WITH OBESITY (HCC): ICD-10-CM

## 2025-06-30 NOTE — TELEPHONE ENCOUNTER
Endocrine Refill protocol for oral and injectable diabetic medications    Protocol Criteria:  PASSED  Reason: N/A    If all below requirements are met, send a 90-day supply with 1 refill per provider protocol.    Verify appointment with Endocrinology completed in the last 6 months or scheduled in the next 3 months.  Verify A1C has been completed within the last 6 months and is below 8.5%     Last completed office visit: 2/5/2025 Roberta Molina APRN   Next scheduled Follow up: No future appointments.   Last A1c result: Last A1c value was 6.7% done 1/22/2025.    Routed for review.

## 2025-07-01 RX ORDER — ORAL SEMAGLUTIDE 7 MG/1
1 TABLET ORAL DAILY
Qty: 30 TABLET | Refills: 0 | Status: SHIPPED | OUTPATIENT
Start: 2025-07-01

## 2025-07-01 NOTE — TELEPHONE ENCOUNTER
Approved rybelsus 7  mg for 30 days until we get another A1c and follow up appointment, please let the patient know.   Lab Results   Component Value Date    A1C 6.7 (A) 01/22/2025    A1C 6.9 (H) 10/03/2024    A1C 6.2 (H) 05/31/2024    A1C 6.6 (H) 02/19/2024    A1C 7.3 (H) 01/10/2024

## 2025-08-07 DIAGNOSIS — E11.69 TYPE 2 DIABETES MELLITUS WITH OBESITY (HCC): ICD-10-CM

## 2025-08-07 DIAGNOSIS — E66.9 TYPE 2 DIABETES MELLITUS WITH OBESITY (HCC): ICD-10-CM

## 2025-08-11 RX ORDER — ORAL SEMAGLUTIDE 7 MG/1
1 TABLET ORAL DAILY
Qty: 30 TABLET | Refills: 0 | Status: SHIPPED | OUTPATIENT
Start: 2025-08-11

## 2025-08-22 DIAGNOSIS — E03.9 HYPOTHYROIDISM, UNSPECIFIED TYPE: ICD-10-CM

## 2025-08-22 RX ORDER — LEVOTHYROXINE SODIUM 50 UG/1
50 TABLET ORAL
Qty: 90 TABLET | Refills: 0 | Status: SHIPPED | OUTPATIENT
Start: 2025-08-22

## (undated) NOTE — Clinical Note
TCM call completed today. An appointment was scheduled with [facility] on 12/1/8/2019. The patient is newly diagnosed with DM 2 and expressed need for further education on diet and dx. Thank you.

## (undated) NOTE — LETTER
03/23/21        Carin Burgos [id number]      Dear Amy Tabares,    [address] [facility] records indicate that you have outstanding lab work and or testing that was ordered for you and has not yet been completed:  Orders Placed This Encou

## (undated) NOTE — ED AVS SNAPSHOT
THE [facility] Emergency Department in [address] [facility]    Phone:  [phone]    Fax:  [phone]           Amy Kennedy   MRN: [id number]    Department:  THE [facility] Emergency Department in [place]   Date of Vis IF THERE IS ANY CHANGE OR WORSENING OF YOUR CONDITION, CALL YOUR PRIMARY CARE PHYSICIAN AT ONCE OR RETURN IMMEDIATELY TO THE EMERGENCY DEPARTMENT.     If you have been prescribed any medication(s), please fill your prescription right away and begin taking t

## (undated) NOTE — LETTER
WHERE IS YOUR PAIN NOW?  Victoriano the areas on your body where you feel the described sensations.  Use the appropriate symbol.  Victoriano the areas of radiation.  Include all affected areas.  Just to complete the picture, please draw in the face.     ACHE:  ^ ^ ^   NUMBNESS:  0000   PINS & NEEDLES:  = = = =                              ^ ^ ^                       0000              = = = =                                    ^ ^ ^                       0000            = = = =      BURNING:  XXXX   STABBING: ////                  XXXX                ////                         XXXX          ////     Please victoriano the line below indicating your degree of pain right now  with 0 being no pain 10 being the worst pain possible.                                         0             1             2              3             4              5              6              7             8             9             10         Patient Signature:

## (undated) NOTE — LETTER
AUTHORIZATION FOR SURGICAL OPERATION OR OTHER PROCEDURE    1. I hereby authorize  {[facility] PM&R Phys:[id number]} and the [facility] staff assigned to my case to perform the following operation and/or procedure at the [facility]:    _______________________________________________________________________________________________      _______________________________________________________________________________________________    2.  My physician has explained the nature and purpose of the operation or other procedure, possible alternative methods of treatment, the risks involved, and the possibility of complication to me.  I acknowledge that no guarantee has been made as to the result that may be obtained.  3.  I recognize that, during the course of this operation, or other procedure, unforseen conditions may necessitate additional or different procedure than those listed above.  I, therefore, further authorize and request that the above named physician, his/her physician assistants or designees perform such procedures as are, in his/her professional opinion, necessary and desirable.  4.  Any tissue or organs removed in the operation or other procedure may be disposed of by and at the discretion of the [facility] staff and [facility].  5.  I understand that in the event of a medical emergency, I will be transported by local paramedics to [facility] or other hospital emergency department.  6.  I certify that I have read and fully understand the above consent to operation and/or other procedure.    7.  I acknowledge that my physician has explained sedation/analgesia administration to me including the risks and benefits.  I consent to the administration of sedation/analgesia as may be necessary or desirable in the judgement of my physician.    Witness signature: ___________________________________________________ Date:  ______/______/_____                    Time:  ________ A.M.  P.M.        Patient Name:  ______________________________________________________  (please print)       Patient signature:  ___________________________________________________             Relationship to Patient:           []  Parent    Responsible person                          []  Spouse  In case of minor or                    [] Other  _____________   Incompetent name:  __________________________________________________                               (please print)      _____________      Responsible person  In case of minor or  Incompetent signature:  _______________________________________________    Statement of Physician  My signature below affirms that prior to the time of the procedure, I have explained to the patient and/or his/her guardian, the risks and benefits involved in the proposed treatment and any reasonable alternative to the proposed treatment.  I have also explained the risks and benefits involved in the refusal of the proposed treatment and have answered the patient's questions.                        Date:  ______/______/_______  Provider                      Signature:  __________________________________________________________       Time:  ___________ A.M    P.M.

## (undated) NOTE — MR AVS SNAPSHOT
After Visit Summary   11/13/2020    Bhumi Velez    MRN: [id number]           Visit Information     Date & Time  11/13/2020 10:15 AM Provider  Marilu Connolly [facility]  [address]  Dept.  Phone  [phone] Instructions: Your order will generate a \"Scheduling Ticket\" that will be available in [employer] to schedule on your own at a time most convenient to you.   Please note, it will take approximately 2 hours from the time your order was placed by your physicia [facility]  Vanita Wills [address]  Monday – Friday  8:00 am – 8:00 pm   Saturday – Sunday  8:00 am – 4:00 pm    [facility]  Primary Care Providers  Treatment for acute illness   or injury that are   non-life-threatening.   Also available b

## (undated) NOTE — LETTER
Date: May 1, 2024      Patient Name: Zenobia Mccoy      : 1980        Thank you for choosing [facility] as your health care provider. Your physician has deemed the following medical service(s) necessary. However, your insurance plan may not pay for all of your health care and costs and may deny payment for this service. The fact that your insurance plan does not pay for an item or service does not mean you should not receive it. The purpose of this form is to help you make an informed decision about whether or not you want to receive this service(s) that may not be paid for by your insurance plan.    CPT Code Description     Cost     Right hip joint steroid injection under US guidance       I understand that the above mentioned service(s) or supply may not be covered by my insurance company. I agree to be financially responsible for the cost of this service or supply in the event of my insurance denies payment as a non-covered benefit.        ______________________________________________________________________  Signature of Patient or Patient's Representative  Relationship  Date    ______________________________________________________________________  Signature of Witness to signing of form   Printed Name

## (undated) NOTE — LETTER
AUTHORIZATION FOR SURGICAL OPERATION OR OTHER PROCEDURE    1. I hereby authorize Dr. Kar Coburn and the [facility] staff assigned to my case to perform the following operation and/or procedure at the [facility]:    Right hip joint steroid injection under US guidance     2.  My physician has explained the nature and purpose of the operation or other procedure, possible alternative methods of treatment, the risks involved, and the possibility of complication to me.  I acknowledge that no guarantee has been made as to the result that may be obtained.  3.  I recognize that, during the course of this operation, or other procedure, unforseen conditions may necessitate additional or different procedure than those listed above.  I, therefore, further authorize and request that the above named physician, his/her physician assistants or designees perform such procedures as are, in his/her professional opinion, necessary and desirable.  4.  Any tissue or organs removed in the operation or other procedure may be disposed of by and at the discretion of the [facility] staff and [facility].  5.  I understand that in the event of a medical emergency, I will be transported by local paramedics to [facility] or other hospital emergency department.  6.  I certify that I have read and fully understand the above consent to operation and/or other procedure.    7.  I acknowledge that my physician has explained sedation/analgesia administration to me including the risks and benefits.  I consent to the administration of sedation/analgesia as may be necessary or desirable in the judgement of my physician.    Witness signature: ___________________________________________________ Date:  ______/______/_____                    Time:  ________ A.M.  P.M.       Patient Name:   Zenobia Mccoy  8/4/1980  [id number]         Patient signature:   ___________________________________________________                 Statement of Physician  My signature below affirms that prior to the time of the procedure, I have explained to the patient and/or his/her guardian, the risks and benefits involved in the proposed treatment and any reasonable alternative to the proposed treatment.  I have also explained the risks and benefits involved in the refusal of the proposed treatment and have answered the patient's questions.                        Date:  ______/______/_______  Provider                      Signature:  __________________________________________________________       Time:  ___________ ADILLAN BARNARD

## (undated) NOTE — LETTER
25    Patient: Zenobia Mccoy  : 1980 Visit date: 2025    Dear  Gale Barnes MD    Thank you for referring Zenobia Mccoy to my practice.  Please find my assessment and plan below.     Assessment/Plan  1. Dysphagia, unspecified type    2. Encounter for screening colonoscopy        Zenobia Mccoy is a 44 year old female referred by Gale Barnes MD     Assessment & Plan    Colonoscopy for colon cancer screening  Scheduled for first colonoscopy due to family history of colon cancer. Informed about procedure, risks, and importance of early detection.The benefits of colonoscopy, including detection of cancer and polyp removal prior to progression to cancer were discussed. The details of the procedure which include navigation of the colonoscope through the anus, rectum, and colon to evaluate the mucosa and removal of any polyps encountered were discussed as well. The risks of colonoscopy were discussed with the patient and include but are not limited to post-procedure bleeding, infection, colorectal perforation, splenic injury, missed polyps, need for additional surgeries or interventions. All questions were answered and the patient voiced understanding and agreed to proceed with colonoscopy.      Dysphagia  Reports occasional difficulty swallowing pills. Upper endoscopy considered to rule out structural abnormalities. Agreed to concurrent endoscopy with colonoscopy.  - Will start with an upper GI with esophagogram to assess swallowing function  - Schedule upper endoscopy with colonoscopy.  Complications of esophagogastroduodenoscopy were discussed and include but are not limited to bleeding and tearing or perforation of the esophagus, stomach, or small intestines. These complications, should they occur, may require surgery, hospitalization, repeat EGD, and/or a transfusion. Perforation of the esophagus, stomach, or duodenum are known, but rare complications which can occur at a  rate of less than 1 per 1,000 endoscopies. Bleeding, usually after a polyp removal, can occur at a rate of less than 1 per 1,000 endoscopies and continue up to two weeks after a polyp is removed. Other extremely rare, but serious or possibly fatal risks include: difficulty breathing, heart attack, and stroke.      Diabetes management  Diabetes managed with Rybelsus and Metformin. Previous severe abdominal pain led to change in medication regimen.  - Continue current diabetes management per dr. Barnes      .      Sincerely,       Tavon Austin MD   CC: No Recipients

## (undated) NOTE — ED AVS SNAPSHOT
THE [facility] Emergency Department in [address] [facility]    Phone:  [phone]    Fax:  [phone]           Johny Dorantes   MRN: [id number]    Department:  THE [facility] Emergency Department in [place]   Date of Vis - Albuterol Sulfate  (90 Base) MCG/ACT Aers  - azithromycin 250 MG Tabs  - benzonatate 200 MG Caps  - predniSONE 20 MG Tabs              Discharge Instructions       Please return to the Emergency department/clinic if symptoms worsen.   Follow up wi a detailed feedback survey mailed to them a week after the visit. If you receive this, we would really appreciate it if you could take the time to complete it. Thank you! You were examined and treated today on an urgent basis only.   This was not a camara 445  [facility] ([address]) [facility] [address] ([address]) [address] ([place] [address]) [address] [facility] Melonie Schwartz & She denies any known fever. FINDINGS:    LUNGS:  No focal consolidation. Normal vascularity. CARDIAC:  Normal size cardiac silhouette. MEDIASTINUM:  Normal.  PLEURA:  Normal.  No pleural effusions. BONES:  Normal for age.              MyChart

## (undated) NOTE — LETTER
04/29/21      Good Afternoon,     Dr. Meggan Maravilla has reviewed your most recent lab results and states they are stable.  She would like you to continue with all of your prescribed medications, including the METFORMIN you were instructed to restart at your most

## (undated) NOTE — Clinical Note
Your patient was recently seen at the [facility] for a hospital follow-up visit. The visit note is attached. Please contact the clinic with any questions at [phone].     Thank you,  IRMA Carter

## (undated) NOTE — LETTER
05/04/18          Zenobia Mccoy  [address]   [address] [id number]            We noticed you selected Dr. Babs Freitas for your primary care provider with your insurance company, however you have never been in to see her.   If you have another pr